# Patient Record
Sex: FEMALE | Race: BLACK OR AFRICAN AMERICAN | NOT HISPANIC OR LATINO
[De-identification: names, ages, dates, MRNs, and addresses within clinical notes are randomized per-mention and may not be internally consistent; named-entity substitution may affect disease eponyms.]

---

## 2019-05-09 ENCOUNTER — FORM ENCOUNTER (OUTPATIENT)
Age: 84
End: 2019-05-09

## 2019-05-10 ENCOUNTER — APPOINTMENT (OUTPATIENT)
Dept: CARDIOTHORACIC SURGERY | Facility: CLINIC | Age: 84
End: 2019-05-10
Payer: MEDICARE

## 2019-05-10 ENCOUNTER — OUTPATIENT (OUTPATIENT)
Dept: OUTPATIENT SERVICES | Facility: HOSPITAL | Age: 84
LOS: 1 days | End: 2019-05-10
Payer: MEDICARE

## 2019-05-10 VITALS
BODY MASS INDEX: 21.69 KG/M2 | HEIGHT: 66 IN | SYSTOLIC BLOOD PRESSURE: 165 MMHG | TEMPERATURE: 97 F | HEART RATE: 100 BPM | OXYGEN SATURATION: 97 % | RESPIRATION RATE: 18 BRPM | WEIGHT: 135 LBS | DIASTOLIC BLOOD PRESSURE: 109 MMHG

## 2019-05-10 DIAGNOSIS — Z82.49 FAMILY HISTORY OF ISCHEMIC HEART DISEASE AND OTHER DISEASES OF THE CIRCULATORY SYSTEM: ICD-10-CM

## 2019-05-10 DIAGNOSIS — I35.1 NONRHEUMATIC AORTIC (VALVE) INSUFFICIENCY: ICD-10-CM

## 2019-05-10 DIAGNOSIS — I34.0 NONRHEUMATIC MITRAL (VALVE) INSUFFICIENCY: ICD-10-CM

## 2019-05-10 DIAGNOSIS — H26.9 UNSPECIFIED CATARACT: ICD-10-CM

## 2019-05-10 PROCEDURE — 99205 OFFICE O/P NEW HI 60 MIN: CPT

## 2019-05-10 PROCEDURE — 93306 TTE W/DOPPLER COMPLETE: CPT | Mod: 26

## 2019-05-10 PROCEDURE — 93306 TTE W/DOPPLER COMPLETE: CPT

## 2019-05-10 NOTE — REVIEW OF SYSTEMS
[Palpitations] : palpitations [SOB on Exertion] : shortness of breath during exertion [Negative] : Heme/Lymph

## 2019-05-23 ENCOUNTER — FORM ENCOUNTER (OUTPATIENT)
Age: 84
End: 2019-05-23

## 2019-05-24 ENCOUNTER — APPOINTMENT (OUTPATIENT)
Dept: CARDIOTHORACIC SURGERY | Facility: CLINIC | Age: 84
End: 2019-05-24
Payer: MEDICARE

## 2019-05-24 ENCOUNTER — APPOINTMENT (OUTPATIENT)
Dept: CARDIOTHORACIC SURGERY | Facility: CLINIC | Age: 84
End: 2019-05-24

## 2019-05-24 ENCOUNTER — OUTPATIENT (OUTPATIENT)
Dept: OUTPATIENT SERVICES | Facility: HOSPITAL | Age: 84
LOS: 1 days | End: 2019-05-24
Payer: MEDICARE

## 2019-05-24 ENCOUNTER — APPOINTMENT (OUTPATIENT)
Dept: CT IMAGING | Facility: HOSPITAL | Age: 84
End: 2019-05-24
Payer: MEDICARE

## 2019-05-24 DIAGNOSIS — I34.0 NONRHEUMATIC MITRAL (VALVE) INSUFFICIENCY: ICD-10-CM

## 2019-05-24 PROCEDURE — 99204 OFFICE O/P NEW MOD 45 MIN: CPT

## 2019-05-24 PROCEDURE — 93325 DOPPLER ECHO COLOR FLOW MAPG: CPT | Mod: 26

## 2019-05-24 PROCEDURE — 93312 ECHO TRANSESOPHAGEAL: CPT | Mod: 26

## 2019-05-24 PROCEDURE — 93312 ECHO TRANSESOPHAGEAL: CPT

## 2019-05-24 PROCEDURE — 75572 CT HRT W/3D IMAGE: CPT

## 2019-05-24 PROCEDURE — 93320 DOPPLER ECHO COMPLETE: CPT | Mod: 26

## 2019-05-24 PROCEDURE — 75572 CT HRT W/3D IMAGE: CPT | Mod: 26

## 2019-05-28 VITALS — DIASTOLIC BLOOD PRESSURE: 97 MMHG | HEART RATE: 63 BPM | OXYGEN SATURATION: 100 % | SYSTOLIC BLOOD PRESSURE: 160 MMHG

## 2019-05-28 NOTE — CONSULT LETTER
[Please see my note below.] : Please see my note below. [Dear  ___] : Dear  [unfilled], [Sincerely,] : Sincerely, [FreeTextEntry2] : Armani Bonilla MD\par [address] [place] [address]\[place]  [phone] [FreeTextEntry1] : Please find attached our consultation on your patient, SUE BLUNT \par We take a multidisciplinary team approach to patient care and consider you, the referring physician, an extension of our team. We will maintain an open line of communication with you throughout your patient's treatment course.  \par It is our commitment to provide your patient with the highest quality of advanced therapeutic options. We thank you for allowing us to participate in the care of your patient.\par Please do not hesitate to contact our team with any questions or concerns at [phone].\par  [FreeTextEntry3] : Chuck Munoz MD, FRCS\par \par [facility] \par Department of Cardiothoracic  Surgery \par Director of Robotic Cardiac Surgery\par Professor of Cardiovascular & Thoracic Surgery\par [facility] [employer] \par \par BETI BraswellP\par

## 2019-05-31 ENCOUNTER — OUTPATIENT (OUTPATIENT)
Dept: OUTPATIENT SERVICES | Facility: HOSPITAL | Age: 84
LOS: 1 days | End: 2019-05-31
Payer: MEDICARE

## 2019-05-31 DIAGNOSIS — I34.0 NONRHEUMATIC MITRAL (VALVE) INSUFFICIENCY: ICD-10-CM

## 2019-05-31 LAB
CK MB CFR SERPL CALC: 4 NG/ML — SIGNIFICANT CHANGE UP (ref 0–6.7)
CK SERPL-CCNC: 208 U/L — HIGH (ref 25–170)
GGT SERPL-CCNC: 34 U/L — SIGNIFICANT CHANGE UP (ref 8–40)
NT-PROBNP SERPL-SCNC: 1027 PG/ML — HIGH (ref 0–300)
URATE SERPL-MCNC: 5 MG/DL — SIGNIFICANT CHANGE UP (ref 2.5–7)

## 2019-05-31 PROCEDURE — 82977 ASSAY OF GGT: CPT

## 2019-05-31 PROCEDURE — 83880 ASSAY OF NATRIURETIC PEPTIDE: CPT

## 2019-05-31 PROCEDURE — 84550 ASSAY OF BLOOD/URIC ACID: CPT

## 2019-05-31 PROCEDURE — 36415 COLL VENOUS BLD VENIPUNCTURE: CPT

## 2019-05-31 PROCEDURE — 82553 CREATINE MB FRACTION: CPT

## 2019-05-31 PROCEDURE — 82550 ASSAY OF CK (CPK): CPT

## 2019-06-21 NOTE — HISTORY OF PRESENT ILLNESS
[FreeTextEntry1] : 89 yo female with a history of heart murmur x many years is referred by Dr. Bonilla with severe MR. She reports worsening palpitations and  FELIZ when walking 1 block. Otherwise she reports remaining active in all ADL's. 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 5/7/19 Echo performed by Dr. Bonilla revealed 3-4+ MR w/ possible flail posterior leaflet, 2+ AI, EF 55-60%. She presents today for surgical consult with Dr. Munoz accompanied by her friend. She had TTE today at [facility] prior to office visit. \par Today she appears well, NAD and younger than her stated age. She denies SOB or palpitations at rest only when she walks. She denies c/o chest pain, fever, brbpr, or syncope.

## 2019-06-21 NOTE — ASSESSMENT
[FreeTextEntry1] : 89 year old female with history of heart murmur presents with nyha class 2-3 symptoms with severe MR. Dr. Munoz reviewed the echocardiogram images with the patient and her friend and discussed the case with Dr. Rojas.  Dr. Munoz discussed the risks, benefits and alternatives to surgery.    Risks include but not limited to death, heart attack, bleeding, stroke, kidney problems and infection.  Dr. Munoz feels she is prohibitive risk for surgery and recommends that she be considered for catheter based procedure to correct MR.  All questions were addressed. \par \par Plan: \par refer to Dr. Rjoas\par MESFIN\par SHD CTA\par \par

## 2019-06-21 NOTE — CONSULT LETTER
[Dear  ___] : Dear  [unfilled], [Please see my note below.] : Please see my note below. [Sincerely,] : Sincerely, [FreeTextEntry2] : Armani Bonilla MD\par [address] [place] [address]\[place]  [phone] [FreeTextEntry1] : Please find attached our consultation on your patient, SUE BLUNT \par We take a multidisciplinary team approach to patient care and consider you, the referring physician, an extension of our team. We will maintain an open line of communication with you throughout your patient's treatment course.  \par It is our commitment to provide your patient with the highest quality of advanced therapeutic options. We thank you for allowing us to participate in the care of your patient.\par Please do not hesitate to contact our team with any questions or concerns at [phone].\par  [FreeTextEntry3] : Chuck Munoz MD, FRCS\par \par [facility] \par Department of Cardiothoracic  Surgery \par Director of Robotic Cardiac Surgery\par Professor of Cardiovascular & Thoracic Surgery\par [facility] [employer] \par \par BETI BraswellP\par

## 2019-06-21 NOTE — PHYSICAL EXAM
[General Appearance - Alert] : alert [Sclera] : the sclera and conjunctiva were normal [PERRL With Normal Accommodation] : pupils were equal in size, round, and reactive to light [General Appearance - In No Acute Distress] : in no acute distress [Outer Ear] : the ears and nose were normal in appearance [Extraocular Movements] : extraocular movements were intact [Oropharynx] : the oropharynx was normal [Neck Appearance] : the appearance of the neck was normal [Neck Cervical Mass (___cm)] : no neck mass was observed [Thyroid Nodule] : there were no palpable thyroid nodules [Thyroid Diffuse Enlargement] : the thyroid was not enlarged [Jugular Venous Distention Increased] : there was no jugular-venous distention [Auscultation Breath Sounds / Voice Sounds] : lungs were clear to auscultation bilaterally [Examination Of The Chest] : the chest was normal in appearance [Chest Visual Inspection Thoracic Asymmetry] : no chest asymmetry [Diminished Respiratory Excursion] : normal chest expansion [No Abnormalities] : the abdominal aorta was not enlarged and no bruit was heard [2+] : left 2+ [Abdomen Soft] : soft [Bowel Sounds] : normal bowel sounds [Abdomen Tenderness] : non-tender [Abdomen Mass (___ Cm)] : no abdominal mass palpated [No CVA Tenderness] : no ~M costovertebral angle tenderness [No Spinal Tenderness] : no spinal tenderness [Abnormal Walk] : normal gait [Nail Clubbing] : no clubbing  or cyanosis of the fingernails [Musculoskeletal - Swelling] : no joint swelling seen [Motor Tone] : muscle strength and tone were normal [Skin Color & Pigmentation] : normal skin color and pigmentation [Skin Turgor] : normal skin turgor [] : no rash [Deep Tendon Reflexes (DTR)] : deep tendon reflexes were 2+ and symmetric [Sensation] : the sensory exam was normal to light touch and pinprick [No Focal Deficits] : no focal deficits [Oriented To Time, Place, And Person] : oriented to person, place, and time [Impaired Insight] : insight and judgment were intact [Affect] : the affect was normal [Right Carotid Bruit] : no bruit heard over the right carotid [Left Carotid Bruit] : no bruit heard over the left carotid [Left Femoral Bruit] : no bruit heard over the left femoral artery [Right Femoral Bruit] : no bruit heard over the right femoral artery [FreeTextEntry1] : 1+ bilateral pedal edema

## 2019-06-26 NOTE — REASON FOR VISIT
[Initial Evaluation] : an initial evaluation of [Friend] : friend [FreeTextEntry1] : mitral regurgitation.

## 2019-06-26 NOTE — PHYSICAL EXAM
[General Appearance - Alert] : alert [General Appearance - In No Acute Distress] : in no acute distress [Sclera] : the sclera and conjunctiva were normal [PERRL With Normal Accommodation] : pupils were equal in size, round, and reactive to light [Extraocular Movements] : extraocular movements were intact [Oropharynx] : the oropharynx was normal [Outer Ear] : the ears and nose were normal in appearance [Neck Appearance] : the appearance of the neck was normal [Thyroid Diffuse Enlargement] : the thyroid was not enlarged [Jugular Venous Distention Increased] : there was no jugular-venous distention [Neck Cervical Mass (___cm)] : no neck mass was observed [Thyroid Nodule] : there were no palpable thyroid nodules [Auscultation Breath Sounds / Voice Sounds] : lungs were clear to auscultation bilaterally [Diminished Respiratory Excursion] : normal chest expansion [Examination Of The Chest] : the chest was normal in appearance [Chest Visual Inspection Thoracic Asymmetry] : no chest asymmetry [2+] : right 2+ [No Abnormalities] : the abdominal aorta was not enlarged and no bruit was heard [Bowel Sounds] : normal bowel sounds [Abdomen Soft] : soft [Abdomen Tenderness] : non-tender [Abdomen Mass (___ Cm)] : no abdominal mass palpated [No Spinal Tenderness] : no spinal tenderness [No CVA Tenderness] : no ~M costovertebral angle tenderness [Nail Clubbing] : no clubbing  or cyanosis of the fingernails [Abnormal Walk] : normal gait [Motor Tone] : muscle strength and tone were normal [Musculoskeletal - Swelling] : no joint swelling seen [Skin Color & Pigmentation] : normal skin color and pigmentation [Skin Turgor] : normal skin turgor [] : no rash [Deep Tendon Reflexes (DTR)] : deep tendon reflexes were 2+ and symmetric [Sensation] : the sensory exam was normal to light touch and pinprick [No Focal Deficits] : no focal deficits [Impaired Insight] : insight and judgment were intact [Oriented To Time, Place, And Person] : oriented to person, place, and time [Affect] : the affect was normal [Right Carotid Bruit] : no bruit heard over the right carotid [Left Carotid Bruit] : no bruit heard over the left carotid [Right Femoral Bruit] : no bruit heard over the right femoral artery [Left Femoral Bruit] : no bruit heard over the left femoral artery [FreeTextEntry1] : 1+ bilateral pedal edema

## 2019-06-26 NOTE — HISTORY OF PRESENT ILLNESS
[FreeTextEntry1] : 89 year old female with a history of HTN, new atrial fibrillation (on Xarelto) and chronic diastolic heart failure with severe mitral regurgitation who has been referred for further evaluation of her valvular heart disease. \par \par The patient reports FELIZ and worsening palpitations when walking about one block. She denies any SOB at rest. The patient also denies chest pain, orthopnea, PND, dizziness, syncope and LE edema. She was sent for an ECHO and found to have severe mitral regurgitation. She was also diagnosed with new atrial fibrillation and started on Toprol and Eliquis.   \par \par The patient live at home by herself. She remains independent in her ADLs. She ambulates with a rollator.

## 2019-06-28 ENCOUNTER — CHART COPY (OUTPATIENT)
Age: 84
End: 2019-06-28

## 2019-07-01 VITALS
HEIGHT: 66 IN | RESPIRATION RATE: 16 BRPM | DIASTOLIC BLOOD PRESSURE: 81 MMHG | WEIGHT: 134.92 LBS | SYSTOLIC BLOOD PRESSURE: 163 MMHG | HEART RATE: 57 BPM | TEMPERATURE: 97 F | OXYGEN SATURATION: 100 %

## 2019-07-02 ENCOUNTER — INPATIENT (INPATIENT)
Facility: HOSPITAL | Age: 84
LOS: 3 days | Discharge: HOME HEALTH | DRG: 229 | End: 2019-07-06
Attending: INTERNAL MEDICINE | Admitting: INTERNAL MEDICINE
Payer: MEDICARE

## 2019-07-02 DIAGNOSIS — Z98.890 OTHER SPECIFIED POSTPROCEDURAL STATES: Chronic | ICD-10-CM

## 2019-07-02 DIAGNOSIS — Z90.710 ACQUIRED ABSENCE OF BOTH CERVIX AND UTERUS: Chronic | ICD-10-CM

## 2019-07-02 LAB
ALBUMIN SERPL ELPH-MCNC: 4.2 G/DL — SIGNIFICANT CHANGE UP (ref 3.3–5)
ALP SERPL-CCNC: 83 U/L — SIGNIFICANT CHANGE UP (ref 40–120)
ALT FLD-CCNC: 16 U/L — SIGNIFICANT CHANGE UP (ref 10–45)
ANION GAP SERPL CALC-SCNC: 11 MMOL/L — SIGNIFICANT CHANGE UP (ref 5–17)
APTT BLD: 38 SEC — HIGH (ref 27.5–36.3)
AST SERPL-CCNC: 24 U/L — SIGNIFICANT CHANGE UP (ref 10–40)
BILIRUB SERPL-MCNC: 0.7 MG/DL — SIGNIFICANT CHANGE UP (ref 0.2–1.2)
BUN SERPL-MCNC: 16 MG/DL — SIGNIFICANT CHANGE UP (ref 7–23)
CALCIUM SERPL-MCNC: 9.5 MG/DL — SIGNIFICANT CHANGE UP (ref 8.4–10.5)
CHLORIDE SERPL-SCNC: 109 MMOL/L — HIGH (ref 96–108)
CHOLEST SERPL-MCNC: 171 MG/DL — SIGNIFICANT CHANGE UP (ref 10–199)
CO2 SERPL-SCNC: 24 MMOL/L — SIGNIFICANT CHANGE UP (ref 22–31)
CREAT SERPL-MCNC: 0.83 MG/DL — SIGNIFICANT CHANGE UP (ref 0.5–1.3)
GLUCOSE SERPL-MCNC: 86 MG/DL — SIGNIFICANT CHANGE UP (ref 70–99)
HBA1C BLD-MCNC: 5.3 % — SIGNIFICANT CHANGE UP (ref 4–5.6)
HCT VFR BLD CALC: 42.5 % — SIGNIFICANT CHANGE UP (ref 34.5–45)
HDLC SERPL-MCNC: 69 MG/DL — SIGNIFICANT CHANGE UP
HGB BLD-MCNC: 13.8 G/DL — SIGNIFICANT CHANGE UP (ref 11.5–15.5)
INR BLD: 1.32 — HIGH (ref 0.88–1.16)
LIPID PNL WITH DIRECT LDL SERPL: 88 MG/DL — SIGNIFICANT CHANGE UP
MAGNESIUM SERPL-MCNC: 2.2 MG/DL — SIGNIFICANT CHANGE UP (ref 1.6–2.6)
MCHC RBC-ENTMCNC: 30.2 PG — SIGNIFICANT CHANGE UP (ref 27–34)
MCHC RBC-ENTMCNC: 32.5 GM/DL — SIGNIFICANT CHANGE UP (ref 32–36)
MCV RBC AUTO: 93 FL — SIGNIFICANT CHANGE UP (ref 80–100)
NRBC # BLD: 0 /100 WBCS — SIGNIFICANT CHANGE UP (ref 0–0)
NT-PROBNP SERPL-SCNC: 873 PG/ML — HIGH (ref 0–300)
PHOSPHATE SERPL-MCNC: 3.5 MG/DL — SIGNIFICANT CHANGE UP (ref 2.5–4.5)
PLATELET # BLD AUTO: 166 K/UL — SIGNIFICANT CHANGE UP (ref 150–400)
POTASSIUM SERPL-MCNC: 4.3 MMOL/L — SIGNIFICANT CHANGE UP (ref 3.5–5.3)
POTASSIUM SERPL-SCNC: 4.3 MMOL/L — SIGNIFICANT CHANGE UP (ref 3.5–5.3)
PROT SERPL-MCNC: 6.9 G/DL — SIGNIFICANT CHANGE UP (ref 6–8.3)
PROTHROM AB SERPL-ACNC: 15.1 SEC — HIGH (ref 10–12.9)
RBC # BLD: 4.57 M/UL — SIGNIFICANT CHANGE UP (ref 3.8–5.2)
RBC # FLD: 13.7 % — SIGNIFICANT CHANGE UP (ref 10.3–14.5)
SODIUM SERPL-SCNC: 144 MMOL/L — SIGNIFICANT CHANGE UP (ref 135–145)
TOTAL CHOLESTEROL/HDL RATIO MEASUREMENT: 2.5 RATIO — LOW (ref 3.3–7.1)
TRIGL SERPL-MCNC: 70 MG/DL — SIGNIFICANT CHANGE UP (ref 10–149)
TSH SERPL-MCNC: 0.7 UIU/ML — SIGNIFICANT CHANGE UP (ref 0.35–4.94)
WBC # BLD: 4.76 K/UL — SIGNIFICANT CHANGE UP (ref 3.8–10.5)
WBC # FLD AUTO: 4.76 K/UL — SIGNIFICANT CHANGE UP (ref 3.8–10.5)

## 2019-07-02 PROCEDURE — 71046 X-RAY EXAM CHEST 2 VIEWS: CPT | Mod: 26

## 2019-07-02 RX ORDER — LOSARTAN POTASSIUM 100 MG/1
25 TABLET, FILM COATED ORAL DAILY
Refills: 0 | Status: DISCONTINUED | OUTPATIENT
Start: 2019-07-02 | End: 2019-07-04

## 2019-07-02 RX ORDER — CHLORHEXIDINE GLUCONATE 213 G/1000ML
1 SOLUTION TOPICAL ONCE
Refills: 0 | Status: COMPLETED | OUTPATIENT
Start: 2019-07-02 | End: 2019-07-02

## 2019-07-02 RX ORDER — LATANOPROST 0.05 MG/ML
1 SOLUTION/ DROPS OPHTHALMIC; TOPICAL AT BEDTIME
Refills: 0 | Status: DISCONTINUED | OUTPATIENT
Start: 2019-07-02 | End: 2019-07-06

## 2019-07-02 RX ORDER — POTASSIUM CHLORIDE 20 MEQ
1 PACKET (EA) ORAL
Qty: 7 | Refills: 0
Start: 2019-07-02 | End: 2019-07-31

## 2019-07-02 RX ORDER — CHLORHEXIDINE GLUCONATE 213 G/1000ML
5 SOLUTION TOPICAL ONCE
Refills: 0 | Status: DISCONTINUED | OUTPATIENT
Start: 2019-07-02 | End: 2019-07-03

## 2019-07-02 RX ORDER — METOPROLOL TARTRATE 50 MG
25 TABLET ORAL DAILY
Refills: 0 | Status: DISCONTINUED | OUTPATIENT
Start: 2019-07-02 | End: 2019-07-03

## 2019-07-02 RX ORDER — FUROSEMIDE 40 MG
1 TABLET ORAL
Qty: 7 | Refills: 0
Start: 2019-07-02 | End: 2019-07-31

## 2019-07-02 RX ORDER — HEPARIN SODIUM 5000 [USP'U]/ML
5000 INJECTION INTRAVENOUS; SUBCUTANEOUS EVERY 8 HOURS
Refills: 0 | Status: DISCONTINUED | OUTPATIENT
Start: 2019-07-02 | End: 2019-07-03

## 2019-07-02 RX ORDER — SODIUM CHLORIDE 9 MG/ML
3 INJECTION INTRAMUSCULAR; INTRAVENOUS; SUBCUTANEOUS EVERY 8 HOURS
Refills: 0 | Status: DISCONTINUED | OUTPATIENT
Start: 2019-07-02 | End: 2019-07-06

## 2019-07-02 RX ORDER — CHLORHEXIDINE GLUCONATE 213 G/1000ML
1 SOLUTION TOPICAL ONCE
Refills: 0 | Status: COMPLETED | OUTPATIENT
Start: 2019-07-02 | End: 2019-07-03

## 2019-07-02 RX ORDER — CHLORHEXIDINE GLUCONATE 213 G/1000ML
1 SOLUTION TOPICAL ONCE
Refills: 0 | Status: COMPLETED | OUTPATIENT
Start: 2019-07-03 | End: 2019-07-03

## 2019-07-02 RX ORDER — FUROSEMIDE 40 MG
20 TABLET ORAL DAILY
Refills: 0 | Status: DISCONTINUED | OUTPATIENT
Start: 2019-07-02 | End: 2019-07-03

## 2019-07-02 RX ORDER — PANTOPRAZOLE SODIUM 20 MG/1
40 TABLET, DELAYED RELEASE ORAL
Refills: 0 | Status: DISCONTINUED | OUTPATIENT
Start: 2019-07-02 | End: 2019-07-06

## 2019-07-02 RX ADMIN — CHLORHEXIDINE GLUCONATE 1 APPLICATION(S): 213 SOLUTION TOPICAL at 23:05

## 2019-07-02 RX ADMIN — LATANOPROST 1 DROP(S): 0.05 SOLUTION/ DROPS OPHTHALMIC; TOPICAL at 22:53

## 2019-07-02 RX ADMIN — PANTOPRAZOLE SODIUM 40 MILLIGRAM(S): 20 TABLET, DELAYED RELEASE ORAL at 16:36

## 2019-07-02 RX ADMIN — LOSARTAN POTASSIUM 25 MILLIGRAM(S): 100 TABLET, FILM COATED ORAL at 16:36

## 2019-07-02 RX ADMIN — HEPARIN SODIUM 5000 UNIT(S): 5000 INJECTION INTRAVENOUS; SUBCUTANEOUS at 22:53

## 2019-07-02 RX ADMIN — SODIUM CHLORIDE 3 MILLILITER(S): 9 INJECTION INTRAMUSCULAR; INTRAVENOUS; SUBCUTANEOUS at 21:48

## 2019-07-02 RX ADMIN — Medication 20 MILLIGRAM(S): at 16:32

## 2019-07-02 NOTE — H&P ADULT - NSHPSOCIALHISTORY_GEN_ALL_CORE
never a smoker, denies alcohol and illicit drug use  lives alone  uses a rollator to walk, independent of her ADLs

## 2019-07-02 NOTE — H&P ADULT - NSICDXPASTMEDICALHX_GEN_ALL_CORE_FT
PAST MEDICAL HISTORY:  Atrial fibrillation     Diastolic heart failure     HTN (hypertension)     Mitral regurgitation

## 2019-07-02 NOTE — H&P ADULT - NSHPREVIEWOFSYSTEMS_GEN_ALL_CORE
Review of Systems  CONSTITUTIONAL:  Denies Fevers / chills, sweats, fatigue, weight loss, weight gain                                      NEURO:  Denies parathesias, seizures, syncope, confusion                                                                                EYES:  Denies Blurry vision, discharge, pain, loss of vision                                                                                    ENMT:  Denies Difficulty hearing, vertigo, dysphagia, epistaxis, recent dental work                                       CV: +LE edema, palpitations, FELIZ, Denies Chest pain, FELIZ,                                                                                          RESPIRATORY:  Denies Wheezing, SOB, cough / sputum, hemoptysis                                                                GI:  Denies Nausea, vommiting, diarrhea, constipation, melena, difficulty swallowing                                               : Denies Hematuria, dysuria, urgency, incontinence                                                                                         MUSKULOSKELETAL:  Denies arthritis, joint swelling, muscle weakness                                                             SKIN/BREAST:  Denies rash, itching, jade loss, masses                                                                                            PSYCH:  Denies depresion, anxiety, suicidal ideation                                                                                               HEME/LYMPH:  Denies bruises easily, enlarged lymph nodes, tender lymph nodes                                        ENDOCRINE:  Denies cold intolerance, heat intolerance, polydipsia

## 2019-07-02 NOTE — H&P ADULT - ASSESSMENT
89 y/o female with hx of HTN, new afib (on eliquis), hx GI bleed, chronic diastolic heart failure with known severe MR who was referred for surgical evaluation. She was seen by Dr. Munoz for surgical intervention but was found to not be a candidate. She was evaluated by Dr. Rojas and found to be a candidate for transcatheter intervention. She reports FELIZ and worsening palpitations when walking about one block. She also states that she has had new LE edema over the past week. She denies any CP, SOB at rest, PND, orthopnea, dizziness, syncope. She underwent a preop workup which included a MESFIN showing severely dilated left and right atria, mitral valve prolapse with flail posterior leaflet ( P2 and P3 scallops). Severe eccentric anteriorly directed mitral valve regurgitation. Aortic sclerosis with mild AI, severe TR and severe pulmonary hypertension, PASP 60mmhg. She had a Structural CTA showing minimal stenosis of the proximal and mid LAD, and the rest of the coronaries were normal. She was planned to have an admission with same day surgery for transcatheter mitral valve repair but she took her Eliquis at 2PM on 7/1. Due to risk of bleeding case was canceled and patient will be admitted for IV diuresis and planned surgery on this admission.         Neuro:  - no significant history, no evidence of pain     CVS;  - HD stable, hx of Afib (will hold Eliquis for pending procedure)  - Hx HTN, on metoprolol and losartan. will continue if indicated, HR 57 in SDA    Pulm:  - will obtain xray on admission to [facility], on RA satting well    GI:  - will start GI ppx while admitted    :  - BUN/Cr normal, no hx of CKD  - will start IV lasix for diuresis prior to procedure    Heme:   - no Eliquis, will start DVT ppx by tomorrow    ID:  - no evidence of infx, WBC normal  - will continue to monitor      Dispo:  - admit to [facility], hold Eliquis and start IV diuretics in preparation for planned transcatheter mitral valve repair

## 2019-07-02 NOTE — H&P ADULT - NSHPPHYSICALEXAM_GEN_ALL_CORE
Physical Exam  CONSTITUTIONAL: NAD, stable   NEURO: A&O x3, no focal deficits                      EYES: EOMI, PERRLA   ENMT: normocephalic, atraumatic, no JVD, no carotid bruit   CV: RRR, normal S1, S2   RESPIRATORY: CTA b/l  GI: +BS, soft, nontender  : no  symtoms   MUSKULOSKELETAL: FROM b/l, no joint swelling  SKIN / BREAST: no lacerations/ abrasions, old abdominal incisions  Vasc: 1+ edema b/l, 2+ DP pulses, doppler + PT b/l, UE 2+, femoral b/l 2+

## 2019-07-02 NOTE — H&P ADULT - HISTORY OF PRESENT ILLNESS
89 y/o female with hx of HTN, new afib (on eliquis), hx GI bleed, chronic diastolic heart failure with known severe MR who was referred for surgical evaluation. She was seen by Dr. Munoz for surgical intervention but was found to not be a candidate. She was evaluated by Dr. Rojas and found to be a candidate for transcatheter intervention. She reports FELIZ and worsening palpitations when walking about one block. She also states that she has had new LE edema over the past week. She denies any CP, SOB at rest, PND, orthopnea, dizziness, syncope. She underwent a preop workup which included a MESFIN showing severely dilated left and right atria, mitral valve prolapse with flail posterior leaflet ( P2 and P3 scallops). Severe eccentric anteriorly directed mitral valve regurgitation. Aortic sclerosis with mild AI, severe TR and severe pulmonary hypertension, PASP 60mmhg. She had a Structural CTA showing minimal stenosis of the proximal and mid LAD, and the rest of the coronaries were normal. She was planned to have an admission with same day surgery for transcatheter mitral valve repair but she took her Eliquis at 2PM on 7/1. Due to risk of bleeding case was canceled and patient will be admitted for IV diuresis and planned surgery on this admission.

## 2019-07-02 NOTE — PROGRESS NOTE ADULT - SUBJECTIVE AND OBJECTIVE BOX
89 y/o female htn pafib severe mr on beta blocker eliquis arb  hx diverticulosis admitted for transcatheter mitral valve repair clinically stable comfortable alert lungs clear heart s1s2 3/6 holosystolic apical murmur bp 140/80 discussed with all concerned

## 2019-07-03 ENCOUNTER — APPOINTMENT (OUTPATIENT)
Dept: CARDIOTHORACIC SURGERY | Facility: HOSPITAL | Age: 84
End: 2019-07-03

## 2019-07-03 ENCOUNTER — APPOINTMENT (OUTPATIENT)
Dept: CARDIOTHORACIC SURGERY | Facility: HOSPITAL | Age: 84
End: 2019-07-03
Payer: MEDICARE

## 2019-07-03 PROBLEM — I34.0 NONRHEUMATIC MITRAL (VALVE) INSUFFICIENCY: Chronic | Status: ACTIVE | Noted: 2019-07-02

## 2019-07-03 PROBLEM — I10 ESSENTIAL (PRIMARY) HYPERTENSION: Chronic | Status: ACTIVE | Noted: 2019-07-02

## 2019-07-03 PROBLEM — I50.30 UNSPECIFIED DIASTOLIC (CONGESTIVE) HEART FAILURE: Chronic | Status: ACTIVE | Noted: 2019-07-02

## 2019-07-03 PROBLEM — I48.91 UNSPECIFIED ATRIAL FIBRILLATION: Chronic | Status: ACTIVE | Noted: 2019-07-02

## 2019-07-03 LAB
ALBUMIN SERPL ELPH-MCNC: 3.6 G/DL — SIGNIFICANT CHANGE UP (ref 3.3–5)
ALP SERPL-CCNC: 85 U/L — SIGNIFICANT CHANGE UP (ref 40–120)
ALT FLD-CCNC: 15 U/L — SIGNIFICANT CHANGE UP (ref 10–45)
ANION GAP SERPL CALC-SCNC: 12 MMOL/L — SIGNIFICANT CHANGE UP (ref 5–17)
ANION GAP SERPL CALC-SCNC: 15 MMOL/L — SIGNIFICANT CHANGE UP (ref 5–17)
APTT BLD: 36 SEC — SIGNIFICANT CHANGE UP (ref 27.5–36.3)
APTT BLD: 40.4 SEC — HIGH (ref 27.5–36.3)
AST SERPL-CCNC: 26 U/L — SIGNIFICANT CHANGE UP (ref 10–40)
BASOPHILS # BLD AUTO: 0.04 K/UL — SIGNIFICANT CHANGE UP (ref 0–0.2)
BASOPHILS NFR BLD AUTO: 0.4 % — SIGNIFICANT CHANGE UP (ref 0–2)
BILIRUB SERPL-MCNC: 0.8 MG/DL — SIGNIFICANT CHANGE UP (ref 0.2–1.2)
BUN SERPL-MCNC: 14 MG/DL — SIGNIFICANT CHANGE UP (ref 7–23)
BUN SERPL-MCNC: 14 MG/DL — SIGNIFICANT CHANGE UP (ref 7–23)
CALCIUM SERPL-MCNC: 9 MG/DL — SIGNIFICANT CHANGE UP (ref 8.4–10.5)
CALCIUM SERPL-MCNC: 9.7 MG/DL — SIGNIFICANT CHANGE UP (ref 8.4–10.5)
CHLORIDE SERPL-SCNC: 105 MMOL/L — SIGNIFICANT CHANGE UP (ref 96–108)
CHLORIDE SERPL-SCNC: 106 MMOL/L — SIGNIFICANT CHANGE UP (ref 96–108)
CHOLEST SERPL-MCNC: 180 MG/DL — SIGNIFICANT CHANGE UP (ref 10–199)
CO2 SERPL-SCNC: 20 MMOL/L — LOW (ref 22–31)
CO2 SERPL-SCNC: 26 MMOL/L — SIGNIFICANT CHANGE UP (ref 22–31)
CREAT SERPL-MCNC: 0.81 MG/DL — SIGNIFICANT CHANGE UP (ref 0.5–1.3)
CREAT SERPL-MCNC: 0.83 MG/DL — SIGNIFICANT CHANGE UP (ref 0.5–1.3)
EOSINOPHIL # BLD AUTO: 0.02 K/UL — SIGNIFICANT CHANGE UP (ref 0–0.5)
EOSINOPHIL NFR BLD AUTO: 0.2 % — SIGNIFICANT CHANGE UP (ref 0–6)
GAS PNL BLDA: SIGNIFICANT CHANGE UP
GAS PNL BLDA: SIGNIFICANT CHANGE UP
GLUCOSE SERPL-MCNC: 106 MG/DL — HIGH (ref 70–99)
GLUCOSE SERPL-MCNC: 89 MG/DL — SIGNIFICANT CHANGE UP (ref 70–99)
HCT VFR BLD CALC: 43.3 % — SIGNIFICANT CHANGE UP (ref 34.5–45)
HCT VFR BLD CALC: 47.1 % — HIGH (ref 34.5–45)
HDLC SERPL-MCNC: 82 MG/DL — SIGNIFICANT CHANGE UP
HGB BLD-MCNC: 14.4 G/DL — SIGNIFICANT CHANGE UP (ref 11.5–15.5)
HGB BLD-MCNC: 15 G/DL — SIGNIFICANT CHANGE UP (ref 11.5–15.5)
IMM GRANULOCYTES NFR BLD AUTO: 0.3 % — SIGNIFICANT CHANGE UP (ref 0–1.5)
INR BLD: 1.24 — HIGH (ref 0.88–1.16)
INR BLD: 1.43 — HIGH (ref 0.88–1.16)
LACTATE SERPL-SCNC: 1.6 MMOL/L — SIGNIFICANT CHANGE UP (ref 0.5–2)
LIPID PNL WITH DIRECT LDL SERPL: 88 MG/DL — SIGNIFICANT CHANGE UP
LYMPHOCYTES # BLD AUTO: 0.62 K/UL — LOW (ref 1–3.3)
LYMPHOCYTES # BLD AUTO: 7 % — LOW (ref 13–44)
MAGNESIUM SERPL-MCNC: 1.6 MG/DL — SIGNIFICANT CHANGE UP (ref 1.6–2.6)
MAGNESIUM SERPL-MCNC: 2 MG/DL — SIGNIFICANT CHANGE UP (ref 1.6–2.6)
MCHC RBC-ENTMCNC: 29.7 PG — SIGNIFICANT CHANGE UP (ref 27–34)
MCHC RBC-ENTMCNC: 30.7 PG — SIGNIFICANT CHANGE UP (ref 27–34)
MCHC RBC-ENTMCNC: 31.8 GM/DL — LOW (ref 32–36)
MCHC RBC-ENTMCNC: 33.3 GM/DL — SIGNIFICANT CHANGE UP (ref 32–36)
MCV RBC AUTO: 92.3 FL — SIGNIFICANT CHANGE UP (ref 80–100)
MCV RBC AUTO: 93.3 FL — SIGNIFICANT CHANGE UP (ref 80–100)
MONOCYTES # BLD AUTO: 0.09 K/UL — SIGNIFICANT CHANGE UP (ref 0–0.9)
MONOCYTES NFR BLD AUTO: 1 % — LOW (ref 2–14)
NEUTROPHILS # BLD AUTO: 8.09 K/UL — HIGH (ref 1.8–7.4)
NEUTROPHILS NFR BLD AUTO: 91.1 % — HIGH (ref 43–77)
NRBC # BLD: 0 /100 WBCS — SIGNIFICANT CHANGE UP (ref 0–0)
NRBC # BLD: 0 /100 WBCS — SIGNIFICANT CHANGE UP (ref 0–0)
PHOSPHATE SERPL-MCNC: 3.7 MG/DL — SIGNIFICANT CHANGE UP (ref 2.5–4.5)
PLATELET # BLD AUTO: 141 K/UL — LOW (ref 150–400)
PLATELET # BLD AUTO: 179 K/UL — SIGNIFICANT CHANGE UP (ref 150–400)
POTASSIUM SERPL-MCNC: 4 MMOL/L — SIGNIFICANT CHANGE UP (ref 3.5–5.3)
POTASSIUM SERPL-MCNC: 4 MMOL/L — SIGNIFICANT CHANGE UP (ref 3.5–5.3)
POTASSIUM SERPL-SCNC: 4 MMOL/L — SIGNIFICANT CHANGE UP (ref 3.5–5.3)
POTASSIUM SERPL-SCNC: 4 MMOL/L — SIGNIFICANT CHANGE UP (ref 3.5–5.3)
PROT SERPL-MCNC: 6.6 G/DL — SIGNIFICANT CHANGE UP (ref 6–8.3)
PROTHROM AB SERPL-ACNC: 14.1 SEC — HIGH (ref 10–12.9)
PROTHROM AB SERPL-ACNC: 16.3 SEC — HIGH (ref 10–12.9)
RBC # BLD: 4.69 M/UL — SIGNIFICANT CHANGE UP (ref 3.8–5.2)
RBC # BLD: 5.05 M/UL — SIGNIFICANT CHANGE UP (ref 3.8–5.2)
RBC # FLD: 13.6 % — SIGNIFICANT CHANGE UP (ref 10.3–14.5)
RBC # FLD: 13.7 % — SIGNIFICANT CHANGE UP (ref 10.3–14.5)
SODIUM SERPL-SCNC: 140 MMOL/L — SIGNIFICANT CHANGE UP (ref 135–145)
SODIUM SERPL-SCNC: 144 MMOL/L — SIGNIFICANT CHANGE UP (ref 135–145)
TOTAL CHOLESTEROL/HDL RATIO MEASUREMENT: 2.2 RATIO — LOW (ref 3.3–7.1)
TRIGL SERPL-MCNC: 51 MG/DL — SIGNIFICANT CHANGE UP (ref 10–149)
WBC # BLD: 4.74 K/UL — SIGNIFICANT CHANGE UP (ref 3.8–10.5)
WBC # BLD: 8.89 K/UL — SIGNIFICANT CHANGE UP (ref 3.8–10.5)
WBC # FLD AUTO: 4.74 K/UL — SIGNIFICANT CHANGE UP (ref 3.8–10.5)
WBC # FLD AUTO: 8.89 K/UL — SIGNIFICANT CHANGE UP (ref 3.8–10.5)

## 2019-07-03 PROCEDURE — 93320 DOPPLER ECHO COMPLETE: CPT | Mod: 26

## 2019-07-03 PROCEDURE — 93312 ECHO TRANSESOPHAGEAL: CPT | Mod: 26

## 2019-07-03 PROCEDURE — 93325 DOPPLER ECHO COLOR FLOW MAPG: CPT | Mod: 26

## 2019-07-03 PROCEDURE — 33419 REPAIR TCAT MITRAL VALVE: CPT | Mod: 62,Q0

## 2019-07-03 PROCEDURE — 33418 REPAIR TCAT MITRAL VALVE: CPT | Mod: 62,Q0

## 2019-07-03 PROCEDURE — 99291 CRITICAL CARE FIRST HOUR: CPT

## 2019-07-03 PROCEDURE — 93010 ELECTROCARDIOGRAM REPORT: CPT

## 2019-07-03 PROCEDURE — 33418 REPAIR TCAT MITRAL VALVE: CPT | Mod: Q0,62

## 2019-07-03 PROCEDURE — 33419 REPAIR TCAT MITRAL VALVE: CPT | Mod: Q0,62

## 2019-07-03 PROCEDURE — 33418 REPAIR TCAT MITRAL VALVE: CPT

## 2019-07-03 PROCEDURE — 71045 X-RAY EXAM CHEST 1 VIEW: CPT | Mod: 26

## 2019-07-03 PROCEDURE — 93010 ELECTROCARDIOGRAM REPORT: CPT | Mod: 77

## 2019-07-03 PROCEDURE — 33419 REPAIR TCAT MITRAL VALVE: CPT

## 2019-07-03 RX ORDER — SENNA PLUS 8.6 MG/1
2 TABLET ORAL AT BEDTIME
Refills: 0 | Status: DISCONTINUED | OUTPATIENT
Start: 2019-07-03 | End: 2019-07-06

## 2019-07-03 RX ORDER — SODIUM CHLORIDE 9 MG/ML
250 INJECTION INTRAMUSCULAR; INTRAVENOUS; SUBCUTANEOUS ONCE
Refills: 0 | Status: COMPLETED | OUTPATIENT
Start: 2019-07-03 | End: 2019-07-03

## 2019-07-03 RX ORDER — ASPIRIN/CALCIUM CARB/MAGNESIUM 324 MG
81 TABLET ORAL DAILY
Refills: 0 | Status: DISCONTINUED | OUTPATIENT
Start: 2019-07-04 | End: 2019-07-06

## 2019-07-03 RX ORDER — MAGNESIUM OXIDE 400 MG ORAL TABLET 241.3 MG
800 TABLET ORAL ONCE
Refills: 0 | Status: COMPLETED | OUTPATIENT
Start: 2019-07-03 | End: 2019-07-03

## 2019-07-03 RX ORDER — POLYETHYLENE GLYCOL 3350 17 G/17G
17 POWDER, FOR SOLUTION ORAL DAILY
Refills: 0 | Status: DISCONTINUED | OUTPATIENT
Start: 2019-07-03 | End: 2019-07-06

## 2019-07-03 RX ORDER — SODIUM CHLORIDE 9 MG/ML
1000 INJECTION INTRAMUSCULAR; INTRAVENOUS; SUBCUTANEOUS
Refills: 0 | Status: DISCONTINUED | OUTPATIENT
Start: 2019-07-03 | End: 2019-07-05

## 2019-07-03 RX ORDER — DOCUSATE SODIUM 100 MG
100 CAPSULE ORAL THREE TIMES A DAY
Refills: 0 | Status: DISCONTINUED | OUTPATIENT
Start: 2019-07-03 | End: 2019-07-06

## 2019-07-03 RX ORDER — CHLORHEXIDINE GLUCONATE 213 G/1000ML
5 SOLUTION TOPICAL EVERY 4 HOURS
Refills: 0 | Status: DISCONTINUED | OUTPATIENT
Start: 2019-07-03 | End: 2019-07-03

## 2019-07-03 RX ORDER — POTASSIUM CHLORIDE 20 MEQ
20 PACKET (EA) ORAL ONCE
Refills: 0 | Status: COMPLETED | OUTPATIENT
Start: 2019-07-03 | End: 2019-07-03

## 2019-07-03 RX ORDER — ASPIRIN/CALCIUM CARB/MAGNESIUM 324 MG
81 TABLET ORAL ONCE
Refills: 0 | Status: COMPLETED | OUTPATIENT
Start: 2019-07-03 | End: 2019-07-03

## 2019-07-03 RX ORDER — CLOPIDOGREL BISULFATE 75 MG/1
600 TABLET, FILM COATED ORAL ONCE
Refills: 0 | Status: DISCONTINUED | OUTPATIENT
Start: 2019-07-03 | End: 2019-07-03

## 2019-07-03 RX ORDER — HEPARIN SODIUM 5000 [USP'U]/ML
5000 INJECTION INTRAVENOUS; SUBCUTANEOUS EVERY 8 HOURS
Refills: 0 | Status: DISCONTINUED | OUTPATIENT
Start: 2019-07-03 | End: 2019-07-04

## 2019-07-03 RX ORDER — FAMOTIDINE 10 MG/ML
20 INJECTION INTRAVENOUS EVERY 12 HOURS
Refills: 0 | Status: DISCONTINUED | OUTPATIENT
Start: 2019-07-03 | End: 2019-07-03

## 2019-07-03 RX ORDER — MEPERIDINE HYDROCHLORIDE 50 MG/ML
25 INJECTION INTRAMUSCULAR; INTRAVENOUS; SUBCUTANEOUS ONCE
Refills: 0 | Status: DISCONTINUED | OUTPATIENT
Start: 2019-07-03 | End: 2019-07-03

## 2019-07-03 RX ADMIN — HEPARIN SODIUM 5000 UNIT(S): 5000 INJECTION INTRAVENOUS; SUBCUTANEOUS at 05:23

## 2019-07-03 RX ADMIN — Medication 81 MILLIGRAM(S): at 16:08

## 2019-07-03 RX ADMIN — Medication 20 MILLIGRAM(S): at 05:22

## 2019-07-03 RX ADMIN — SODIUM CHLORIDE 30 MILLILITER(S): 9 INJECTION INTRAMUSCULAR; INTRAVENOUS; SUBCUTANEOUS at 08:22

## 2019-07-03 RX ADMIN — SODIUM CHLORIDE 500 MILLILITER(S): 9 INJECTION INTRAMUSCULAR; INTRAVENOUS; SUBCUTANEOUS at 08:22

## 2019-07-03 RX ADMIN — Medication 20 MILLIEQUIVALENT(S): at 23:46

## 2019-07-03 RX ADMIN — Medication 25 MILLIGRAM(S): at 05:23

## 2019-07-03 RX ADMIN — CHLORHEXIDINE GLUCONATE 1 APPLICATION(S): 213 SOLUTION TOPICAL at 05:23

## 2019-07-03 RX ADMIN — SODIUM CHLORIDE 3 MILLILITER(S): 9 INJECTION INTRAMUSCULAR; INTRAVENOUS; SUBCUTANEOUS at 05:23

## 2019-07-03 RX ADMIN — MAGNESIUM OXIDE 400 MG ORAL TABLET 800 MILLIGRAM(S): 241.3 TABLET ORAL at 23:45

## 2019-07-03 RX ADMIN — SODIUM CHLORIDE 3 MILLILITER(S): 9 INJECTION INTRAMUSCULAR; INTRAVENOUS; SUBCUTANEOUS at 21:15

## 2019-07-03 RX ADMIN — CHLORHEXIDINE GLUCONATE 1 APPLICATION(S): 213 SOLUTION TOPICAL at 07:08

## 2019-07-03 NOTE — PROGRESS NOTE ADULT - SUBJECTIVE AND OBJECTIVE BOX
89 y/o female htn pafib severe mr for transcatheter mitral valve repair alert conversant stable hemodynamically improved with diuretics lungs good air entry heart 3/6 holosystolic apical murmur bp 130/80 discussed with all concerned

## 2019-07-03 NOTE — BRIEF OPERATIVE NOTE - NSICDXBRIEFPROCEDURE_GEN_ALL_CORE_FT
PROCEDURES:  Percutaneous transcatheter repair of mitral valve using multiple leaflet clips 03-Jul-2019 20:05:44  Allan Cuadra

## 2019-07-03 NOTE — PROGRESS NOTE ADULT - SUBJECTIVE AND OBJECTIVE BOX
CTICU  CRITICAL  CARE  attending     Hand off received 					   Pertinent clinical, laboratory, radiographic, hemodynamic, echocardiographic, respiratory data, microbiologic data and chart were reviewed and analyzed frequently throughout the course of the day and night  Patient seen and examined with CTS/ SH attending at bedside    Pt is a 89y , Female, s/p Percutaneous transcatheter repair of mitral valve using multiple leaflet clips ; for severe MR    Extubated in the hybrid OR room  post procedure; transient use of Bipap  on nasal canula O 2 now      , FAMILY HISTORY:  PAST MEDICAL & SURGICAL HISTORY:  HTN (hypertension)  Atrial fibrillation  Mitral regurgitation  Diastolic heart failure  H/O total hysterectomy  H/O exploratory laparotomy    Patient is a 89y old  Female who presents with a chief complaint of mitral regurgitation (03 Jul 2019 07:41)      14 system review was unremarkable  acute changes include acute respiratory failure  Vital signs, hemodynamic and respiratory parameters were reviewed from the bedside nursing flowsheet.  ICU Vital Signs Last 24 Hrs  T(C): 36.9 (03 Jul 2019 21:27), Max: 36.9 (03 Jul 2019 21:00)  T(F): 98.5 (03 Jul 2019 21:27), Max: 98.5 (03 Jul 2019 21:00)  HR: 58 (04 Jul 2019 00:30) (50 - 60)  BP: 91/66 (03 Jul 2019 21:45) (91/66 - 154/89)  BP(mean): 73 (03 Jul 2019 21:45) (73 - 112)  ABP: 124/64 (04 Jul 2019 00:30) (98/50 - 130/68)  ABP(mean): 86 (04 Jul 2019 00:30) (68 - 92)  RR: 13 (04 Jul 2019 00:30) (11 - 28)  SpO2: 100% (04 Jul 2019 00:30) (99% - 100%)    Adult Advanced Hemodynamics Last 24 Hrs  CVP(mm Hg): --  CVP(cm H2O): --  CO: --  CI: --  PA: --  PA(mean): --  PCWP: --  SVR: --  SVRI: --  PVR: --  PVRI: --, ABG - ( 03 Jul 2019 22:38 )  pH, Arterial: 7.47  pH, Blood: x     /  pCO2: 26    /  pO2: 208   / HCO3: 19    / Base Excess: -3.3  /  SaO2: 100                 Intake and output was reviewed and the fluid balance was calculated  Daily     Daily   I&O's Summary    02 Jul 2019 07:01  -  03 Jul 2019 07:00  --------------------------------------------------------  IN: 0 mL / OUT: 1500 mL / NET: -1500 mL    03 Jul 2019 07:01  -  04 Jul 2019 01:22  --------------------------------------------------------  IN: 340 mL / OUT: 2150 mL / NET: -1810 mL        All lines and drain sites were assessed  Glycemic trend was reviewedCAPILLARY BLOOD GLUCOSE        No acute change in mental status  Auscultation of the chest reveals equal bs  Abdomen is soft  Extremities are warm and well perfused  Wounds appear clean and unremarkable  Antibiotics are periop    labs  CBC Full  -  ( 03 Jul 2019 21:10 )  WBC Count : 8.89 K/uL  RBC Count : 4.69 M/uL  Hemoglobin : 14.4 g/dL  Hematocrit : 43.3 %  Platelet Count - Automated : 141 K/uL  Mean Cell Volume : 92.3 fl  Mean Cell Hemoglobin : 30.7 pg  Mean Cell Hemoglobin Concentration : 33.3 gm/dL  Auto Neutrophil # : 8.09 K/uL  Auto Lymphocyte # : 0.62 K/uL  Auto Monocyte # : 0.09 K/uL  Auto Eosinophil # : 0.02 K/uL  Auto Basophil # : 0.04 K/uL  Auto Neutrophil % : 91.1 %  Auto Lymphocyte % : 7.0 %  Auto Monocyte % : 1.0 %  Auto Eosinophil % : 0.2 %  Auto Basophil % : 0.4 %    07-03    140  |  105  |  14  ----------------------------<  106<H>  4.0   |  20<L>  |  0.83    Ca    9.0      03 Jul 2019 21:10  Phos  3.7     07-03  Mg     1.6     07-03    TPro  6.6  /  Alb  3.6  /  TBili  0.8  /  DBili  x   /  AST  26  /  ALT  15  /  AlkPhos  85  07-03    PT/INR - ( 03 Jul 2019 21:10 )   PT: 16.3 sec;   INR: 1.43          PTT - ( 03 Jul 2019 21:10 )  PTT:36.0 sec  The current medications were reviewed   MEDICATIONS  (STANDING):  aspirin enteric coated 81 milliGRAM(s) Oral daily  docusate sodium 100 milliGRAM(s) Oral three times a day  heparin  Injectable 5000 Unit(s) SubCutaneous every 8 hours  latanoprost 0.005% Ophthalmic Solution 1 Drop(s) Both EYES at bedtime  losartan 25 milliGRAM(s) Oral daily  pantoprazole    Tablet 40 milliGRAM(s) Oral before breakfast  senna 2 Tablet(s) Oral at bedtime  sodium chloride 0.9% lock flush 3 milliLiter(s) IV Push every 8 hours  sodium chloride 0.9%. 1000 milliLiter(s) (30 mL/Hr) IV Continuous <Continuous>  sodium chloride 0.9%. 1000 milliLiter(s) (10 mL/Hr) IV Continuous <Continuous>    MEDICATIONS  (PRN):  polyethylene glycol 3350 17 Gram(s) Oral daily PRN Constipation       PROBLEM LIST/ ASSESSMENT:  HEALTH ISSUES - PROBLEM Dx:  HTN (hypertension)  Atrial fibrillation  Mitral regurgitation  Diastolic heart failure      ,   Patient is a 89y old  Female who presents with a chief complaint of mitral regurgitation (03 Jul 2019 07:41)     s/p Percutaneous transcatheter repair of mitral valve using multiple leaflet clips ; for severe MR      My plan includes :  close hemodynamic, ventilatory and drain monitoring and management per post op routine    Monitor for arrhythmias and monitor parameters for organ perfusion  monitor neurologic status  Head of the bed should remain elevated to 45 deg .   chest PT and IS will be encouraged  monitor adequacy of oxygenation and ventilation and attempt to wean oxygen  Nutritional goals will be met using po eventually , ensure adequate caloric intake and montior the same  Stress ulcer and VTE prophylaxis will be achieved    Glycemic control is satisfactory  Electrolytes have been repleted as necessary and wound care has been carried out. Pain control has been achieved.   agressive physical therapy and early mobility and ambulation goals will be met   The family was updated about the course and plan  CRITICAL CARE TIME SPENT in evaluation and management, reassessments, review and interpretation of labs and x-rays, ventilator and hemodynamic management, formulating a plan and coordinating care: __55__ MIN.  Time does not include procedural time.  CTICU ATTENDING     					    Narayan Dominguez M.D.

## 2019-07-04 LAB
ALBUMIN SERPL ELPH-MCNC: 3.7 G/DL — SIGNIFICANT CHANGE UP (ref 3.3–5)
ALBUMIN SERPL ELPH-MCNC: 3.8 G/DL — SIGNIFICANT CHANGE UP (ref 3.3–5)
ALP SERPL-CCNC: 73 U/L — SIGNIFICANT CHANGE UP (ref 40–120)
ALP SERPL-CCNC: 82 U/L — SIGNIFICANT CHANGE UP (ref 40–120)
ALT FLD-CCNC: 14 U/L — SIGNIFICANT CHANGE UP (ref 10–45)
ALT FLD-CCNC: 15 U/L — SIGNIFICANT CHANGE UP (ref 10–45)
ANION GAP SERPL CALC-SCNC: 15 MMOL/L — SIGNIFICANT CHANGE UP (ref 5–17)
ANION GAP SERPL CALC-SCNC: 17 MMOL/L — SIGNIFICANT CHANGE UP (ref 5–17)
APTT BLD: 43.7 SEC — HIGH (ref 27.5–36.3)
APTT BLD: 51.1 SEC — HIGH (ref 27.5–36.3)
APTT BLD: >200 SEC — CRITICAL HIGH (ref 27.5–36.3)
AST SERPL-CCNC: 26 U/L — SIGNIFICANT CHANGE UP (ref 10–40)
AST SERPL-CCNC: 28 U/L — SIGNIFICANT CHANGE UP (ref 10–40)
BILIRUB SERPL-MCNC: 0.8 MG/DL — SIGNIFICANT CHANGE UP (ref 0.2–1.2)
BILIRUB SERPL-MCNC: 0.9 MG/DL — SIGNIFICANT CHANGE UP (ref 0.2–1.2)
BUN SERPL-MCNC: 17 MG/DL — SIGNIFICANT CHANGE UP (ref 7–23)
BUN SERPL-MCNC: 17 MG/DL — SIGNIFICANT CHANGE UP (ref 7–23)
CALCIUM SERPL-MCNC: 9 MG/DL — SIGNIFICANT CHANGE UP (ref 8.4–10.5)
CALCIUM SERPL-MCNC: 9 MG/DL — SIGNIFICANT CHANGE UP (ref 8.4–10.5)
CHLORIDE SERPL-SCNC: 103 MMOL/L — SIGNIFICANT CHANGE UP (ref 96–108)
CHLORIDE SERPL-SCNC: 104 MMOL/L — SIGNIFICANT CHANGE UP (ref 96–108)
CK MB CFR SERPL CALC: 5.7 NG/ML — SIGNIFICANT CHANGE UP (ref 0–6.7)
CK SERPL-CCNC: 241 U/L — HIGH (ref 25–170)
CO2 SERPL-SCNC: 18 MMOL/L — LOW (ref 22–31)
CO2 SERPL-SCNC: 20 MMOL/L — LOW (ref 22–31)
CREAT SERPL-MCNC: 0.87 MG/DL — SIGNIFICANT CHANGE UP (ref 0.5–1.3)
CREAT SERPL-MCNC: 0.95 MG/DL — SIGNIFICANT CHANGE UP (ref 0.5–1.3)
GAS PNL BLDA: SIGNIFICANT CHANGE UP
GAS PNL BLDA: SIGNIFICANT CHANGE UP
GLUCOSE SERPL-MCNC: 119 MG/DL — HIGH (ref 70–99)
GLUCOSE SERPL-MCNC: 123 MG/DL — HIGH (ref 70–99)
HCT VFR BLD CALC: 40.6 % — SIGNIFICANT CHANGE UP (ref 34.5–45)
HCT VFR BLD CALC: 42.2 % — SIGNIFICANT CHANGE UP (ref 34.5–45)
HGB BLD-MCNC: 13.4 G/DL — SIGNIFICANT CHANGE UP (ref 11.5–15.5)
HGB BLD-MCNC: 13.7 G/DL — SIGNIFICANT CHANGE UP (ref 11.5–15.5)
INR BLD: 1.31 — HIGH (ref 0.88–1.16)
INR BLD: 1.37 — HIGH (ref 0.88–1.16)
INR BLD: 1.4 — HIGH (ref 0.88–1.16)
MAGNESIUM SERPL-MCNC: 1.6 MG/DL — SIGNIFICANT CHANGE UP (ref 1.6–2.6)
MAGNESIUM SERPL-MCNC: 1.9 MG/DL — SIGNIFICANT CHANGE UP (ref 1.6–2.6)
MCHC RBC-ENTMCNC: 30 PG — SIGNIFICANT CHANGE UP (ref 27–34)
MCHC RBC-ENTMCNC: 30.2 PG — SIGNIFICANT CHANGE UP (ref 27–34)
MCHC RBC-ENTMCNC: 32.5 GM/DL — SIGNIFICANT CHANGE UP (ref 32–36)
MCHC RBC-ENTMCNC: 33 GM/DL — SIGNIFICANT CHANGE UP (ref 32–36)
MCV RBC AUTO: 91.6 FL — SIGNIFICANT CHANGE UP (ref 80–100)
MCV RBC AUTO: 92.3 FL — SIGNIFICANT CHANGE UP (ref 80–100)
NRBC # BLD: 0 /100 WBCS — SIGNIFICANT CHANGE UP (ref 0–0)
NRBC # BLD: 0 /100 WBCS — SIGNIFICANT CHANGE UP (ref 0–0)
PHOSPHATE SERPL-MCNC: 4 MG/DL — SIGNIFICANT CHANGE UP (ref 2.5–4.5)
PHOSPHATE SERPL-MCNC: 4.3 MG/DL — SIGNIFICANT CHANGE UP (ref 2.5–4.5)
PLATELET # BLD AUTO: 161 K/UL — SIGNIFICANT CHANGE UP (ref 150–400)
PLATELET # BLD AUTO: 171 K/UL — SIGNIFICANT CHANGE UP (ref 150–400)
POTASSIUM SERPL-MCNC: 4.2 MMOL/L — SIGNIFICANT CHANGE UP (ref 3.5–5.3)
POTASSIUM SERPL-MCNC: 4.2 MMOL/L — SIGNIFICANT CHANGE UP (ref 3.5–5.3)
POTASSIUM SERPL-SCNC: 4.2 MMOL/L — SIGNIFICANT CHANGE UP (ref 3.5–5.3)
POTASSIUM SERPL-SCNC: 4.2 MMOL/L — SIGNIFICANT CHANGE UP (ref 3.5–5.3)
PROT SERPL-MCNC: 6.2 G/DL — SIGNIFICANT CHANGE UP (ref 6–8.3)
PROT SERPL-MCNC: 6.4 G/DL — SIGNIFICANT CHANGE UP (ref 6–8.3)
PROTHROM AB SERPL-ACNC: 14.9 SEC — HIGH (ref 10–12.9)
PROTHROM AB SERPL-ACNC: 15.6 SEC — HIGH (ref 10–12.9)
PROTHROM AB SERPL-ACNC: 16 SEC — HIGH (ref 10–12.9)
RBC # BLD: 4.43 M/UL — SIGNIFICANT CHANGE UP (ref 3.8–5.2)
RBC # BLD: 4.57 M/UL — SIGNIFICANT CHANGE UP (ref 3.8–5.2)
RBC # FLD: 13.5 % — SIGNIFICANT CHANGE UP (ref 10.3–14.5)
RBC # FLD: 13.6 % — SIGNIFICANT CHANGE UP (ref 10.3–14.5)
SODIUM SERPL-SCNC: 138 MMOL/L — SIGNIFICANT CHANGE UP (ref 135–145)
SODIUM SERPL-SCNC: 139 MMOL/L — SIGNIFICANT CHANGE UP (ref 135–145)
TROPONIN T SERPL-MCNC: 0.02 NG/ML — HIGH (ref 0–0.01)
WBC # BLD: 8 K/UL — SIGNIFICANT CHANGE UP (ref 3.8–10.5)
WBC # BLD: 8.43 K/UL — SIGNIFICANT CHANGE UP (ref 3.8–10.5)
WBC # FLD AUTO: 8 K/UL — SIGNIFICANT CHANGE UP (ref 3.8–10.5)
WBC # FLD AUTO: 8.43 K/UL — SIGNIFICANT CHANGE UP (ref 3.8–10.5)

## 2019-07-04 PROCEDURE — 71045 X-RAY EXAM CHEST 1 VIEW: CPT | Mod: 26

## 2019-07-04 PROCEDURE — 93010 ELECTROCARDIOGRAM REPORT: CPT

## 2019-07-04 RX ORDER — ALBUMIN HUMAN 25 %
250 VIAL (ML) INTRAVENOUS ONCE
Refills: 0 | Status: COMPLETED | OUTPATIENT
Start: 2019-07-04 | End: 2019-07-04

## 2019-07-04 RX ORDER — MAGNESIUM OXIDE 400 MG ORAL TABLET 241.3 MG
800 TABLET ORAL ONCE
Refills: 0 | Status: COMPLETED | OUTPATIENT
Start: 2019-07-04 | End: 2019-07-04

## 2019-07-04 RX ORDER — HEPARIN SODIUM 5000 [USP'U]/ML
800 INJECTION INTRAVENOUS; SUBCUTANEOUS
Qty: 25000 | Refills: 0 | Status: DISCONTINUED | OUTPATIENT
Start: 2019-07-04 | End: 2019-07-05

## 2019-07-04 RX ADMIN — PANTOPRAZOLE SODIUM 40 MILLIGRAM(S): 20 TABLET, DELAYED RELEASE ORAL at 06:09

## 2019-07-04 RX ADMIN — MAGNESIUM OXIDE 400 MG ORAL TABLET 800 MILLIGRAM(S): 241.3 TABLET ORAL at 06:08

## 2019-07-04 RX ADMIN — LATANOPROST 1 DROP(S): 0.05 SOLUTION/ DROPS OPHTHALMIC; TOPICAL at 21:59

## 2019-07-04 RX ADMIN — HEPARIN SODIUM 5000 UNIT(S): 5000 INJECTION INTRAVENOUS; SUBCUTANEOUS at 15:33

## 2019-07-04 RX ADMIN — HEPARIN SODIUM 8 UNIT(S)/HR: 5000 INJECTION INTRAVENOUS; SUBCUTANEOUS at 17:27

## 2019-07-04 RX ADMIN — HEPARIN SODIUM 5000 UNIT(S): 5000 INJECTION INTRAVENOUS; SUBCUTANEOUS at 06:07

## 2019-07-04 RX ADMIN — LATANOPROST 1 DROP(S): 0.05 SOLUTION/ DROPS OPHTHALMIC; TOPICAL at 01:03

## 2019-07-04 RX ADMIN — Medication 100 MILLIGRAM(S): at 15:33

## 2019-07-04 RX ADMIN — SODIUM CHLORIDE 3 MILLILITER(S): 9 INJECTION INTRAMUSCULAR; INTRAVENOUS; SUBCUTANEOUS at 22:31

## 2019-07-04 RX ADMIN — SENNA PLUS 2 TABLET(S): 8.6 TABLET ORAL at 21:21

## 2019-07-04 RX ADMIN — Medication 100 MILLIGRAM(S): at 21:21

## 2019-07-04 RX ADMIN — SODIUM CHLORIDE 3 MILLILITER(S): 9 INJECTION INTRAMUSCULAR; INTRAVENOUS; SUBCUTANEOUS at 05:53

## 2019-07-04 RX ADMIN — Medication 100 MILLIGRAM(S): at 06:08

## 2019-07-04 RX ADMIN — Medication 81 MILLIGRAM(S): at 12:00

## 2019-07-04 RX ADMIN — SODIUM CHLORIDE 3 MILLILITER(S): 9 INJECTION INTRAMUSCULAR; INTRAVENOUS; SUBCUTANEOUS at 15:07

## 2019-07-04 RX ADMIN — LOSARTAN POTASSIUM 25 MILLIGRAM(S): 100 TABLET, FILM COATED ORAL at 12:00

## 2019-07-04 RX ADMIN — Medication 50 MILLILITER(S): at 02:22

## 2019-07-04 NOTE — PHYSICAL THERAPY INITIAL EVALUATION ADULT - GAIT DEVIATIONS NOTED, PT EVAL
decreased velocity of limb motion/decreased step length/decreased stride length/decreased gait speed

## 2019-07-04 NOTE — PHYSICAL THERAPY INITIAL EVALUATION ADULT - PERTINENT HX OF CURRENT PROBLEM, REHAB EVAL
89 y/o female with hx of HTN, new afib (on eliquis), hx GI bleed, chronic diastolic heart failure with known severe MR who was referred for surgical evaluation.

## 2019-07-04 NOTE — PROGRESS NOTE ADULT - ASSESSMENT
s/p Mitral Valve repair     doing well post procedure     ROSMERY Bonilla s/p Mitral Valve repair     doing well post procedure     cont post procedure care     ROSMERY Bonilla

## 2019-07-04 NOTE — PROGRESS NOTE ADULT - SUBJECTIVE AND OBJECTIVE BOX
Patient discussed on morning rounds with Dr. Rojas    Operation / Date: 7/3/19: MV Repair, transcatheter with russell cookie clasp x 2    SUBJECTIVE ASSESSMENT:  89y Female seen at bedside this morning.         Vital Signs Last 24 Hrs  T(C): 36.6 (04 Jul 2019 17:00), Max: 37 (04 Jul 2019 05:01)  T(F): 97.8 (04 Jul 2019 17:00), Max: 98.6 (04 Jul 2019 05:01)  HR: 60 (04 Jul 2019 16:24) (50 - 70)  BP: 102/50 (04 Jul 2019 16:24) (90/50 - 114/71)  BP(mean): 69 (04 Jul 2019 16:24) (64 - 96)  RR: 16 (04 Jul 2019 16:24) (11 - 32)  SpO2: 97% (04 Jul 2019 16:24) (96% - 100%)  I&O's Detail    03 Jul 2019 07:01  -  04 Jul 2019 07:00  --------------------------------------------------------  IN:    Albumin 5%  - 250 mL: 250 mL    Oral Fluid: 350 mL    Sodium Chloride 0.9% IV Bolus: 250 mL    sodium chloride 0.9%.: 90 mL  Total IN: 940 mL    OUT:    Voided: 2150 mL  Total OUT: 2150 mL    Total NET: -1210 mL      04 Jul 2019 07:01  -  04 Jul 2019 19:12  --------------------------------------------------------  IN:    Oral Fluid: 600 mL  Total IN: 600 mL    OUT:    Voided: 200 mL  Total OUT: 200 mL    Total NET: 400 mL          CHEST TUBE:  Yes/No. AIR LEAKS: Yes/No. Suction / H2O SEAL.   PATRICK DRAIN:  Yes/No.  EPICARDIAL WIRES: Yes/No.  TIE DOWNS: Yes/No.  SMITH: Yes/No.    PHYSICAL EXAM:    General:     Neurological:    Cardiovascular:    Respiratory:    Gastrointestinal:    Extremities:    Vascular:    Incision Sites:    LABS:                        13.4   8.43  )-----------( 171      ( 04 Jul 2019 08:47 )             40.6       COUMADIN:  Yes/No. REASON: .    PT/INR - ( 04 Jul 2019 08:47 )   PT: 16.0 sec;   INR: 1.40          PTT - ( 04 Jul 2019 08:47 )  PTT:51.1 sec    07-04    139  |  104  |  17  ----------------------------<  123<H>  4.2   |  18<L>  |  0.87    Ca    9.0      04 Jul 2019 08:47  Phos  4.0     07-04  Mg     1.9     07-04    TPro  6.2  /  Alb  3.8  /  TBili  0.8  /  DBili  x   /  AST  26  /  ALT  14  /  AlkPhos  73  07-04          MEDICATIONS  (STANDING):  aspirin enteric coated 81 milliGRAM(s) Oral daily  docusate sodium 100 milliGRAM(s) Oral three times a day  heparin  Infusion 800 Unit(s)/Hr (8 mL/Hr) IV Continuous <Continuous>  latanoprost 0.005% Ophthalmic Solution 1 Drop(s) Both EYES at bedtime  losartan 25 milliGRAM(s) Oral daily  pantoprazole    Tablet 40 milliGRAM(s) Oral before breakfast  senna 2 Tablet(s) Oral at bedtime  sodium chloride 0.9% lock flush 3 milliLiter(s) IV Push every 8 hours  sodium chloride 0.9%. 1000 milliLiter(s) (30 mL/Hr) IV Continuous <Continuous>  sodium chloride 0.9%. 1000 milliLiter(s) (10 mL/Hr) IV Continuous <Continuous>    MEDICATIONS  (PRN):  polyethylene glycol 3350 17 Gram(s) Oral daily PRN Constipation        RADIOLOGY & ADDITIONAL TESTS: Patient discussed on morning rounds with Dr. Rojas    Operation / Date: 7/3/19: MV Repair, transcatheter with russell cookie clasp x 2    SUBJECTIVE ASSESSMENT:  89y Female seen at bedside this morning. She states she is feeling well with no acute complaints. Was able to walk in the hallway without difficulty         Vital Signs Last 24 Hrs  T(C): 36.6 (04 Jul 2019 17:00), Max: 37 (04 Jul 2019 05:01)  T(F): 97.8 (04 Jul 2019 17:00), Max: 98.6 (04 Jul 2019 05:01)  HR: 60 (04 Jul 2019 16:24) (50 - 70)  BP: 102/50 (04 Jul 2019 16:24) (90/50 - 114/71)  BP(mean): 69 (04 Jul 2019 16:24) (64 - 96)  RR: 16 (04 Jul 2019 16:24) (11 - 32)  SpO2: 97% (04 Jul 2019 16:24) (96% - 100%)  I&O's Detail    03 Jul 2019 07:01  -  04 Jul 2019 07:00  --------------------------------------------------------  IN:    Albumin 5%  - 250 mL: 250 mL    Oral Fluid: 350 mL    Sodium Chloride 0.9% IV Bolus: 250 mL    sodium chloride 0.9%.: 90 mL  Total IN: 940 mL    OUT:    Voided: 2150 mL  Total OUT: 2150 mL    Total NET: -1210 mL      04 Jul 2019 07:01  -  04 Jul 2019 19:12  --------------------------------------------------------  IN:    Oral Fluid: 600 mL  Total IN: 600 mL    OUT:    Voided: 200 mL  Total OUT: 200 mL    Total NET: 400 mL          CHEST TUBE:  No  PATRICK DRAIN:  No.  EPICARDIAL WIRES: No.  TIE DOWNS: Mattress suture out  SMITH: No    PHYSICAL EXAM:    General: NAD, stable    Neurological: A&O x3, no focal deficits     Cardiovascular: RRR, normal S1, S2     Respiratory:    Gastrointestinal:    Extremities:    Vascular:    Incision Sites:    LABS:                        13.4   8.43  )-----------( 171      ( 04 Jul 2019 08:47 )             40.6       COUMADIN:  Yes/No. REASON: .    PT/INR - ( 04 Jul 2019 08:47 )   PT: 16.0 sec;   INR: 1.40          PTT - ( 04 Jul 2019 08:47 )  PTT:51.1 sec    07-04    139  |  104  |  17  ----------------------------<  123<H>  4.2   |  18<L>  |  0.87    Ca    9.0      04 Jul 2019 08:47  Phos  4.0     07-04  Mg     1.9     07-04    TPro  6.2  /  Alb  3.8  /  TBili  0.8  /  DBili  x   /  AST  26  /  ALT  14  /  AlkPhos  73  07-04          MEDICATIONS  (STANDING):  aspirin enteric coated 81 milliGRAM(s) Oral daily  docusate sodium 100 milliGRAM(s) Oral three times a day  heparin  Infusion 800 Unit(s)/Hr (8 mL/Hr) IV Continuous <Continuous>  latanoprost 0.005% Ophthalmic Solution 1 Drop(s) Both EYES at bedtime  losartan 25 milliGRAM(s) Oral daily  pantoprazole    Tablet 40 milliGRAM(s) Oral before breakfast  senna 2 Tablet(s) Oral at bedtime  sodium chloride 0.9% lock flush 3 milliLiter(s) IV Push every 8 hours  sodium chloride 0.9%. 1000 milliLiter(s) (30 mL/Hr) IV Continuous <Continuous>  sodium chloride 0.9%. 1000 milliLiter(s) (10 mL/Hr) IV Continuous <Continuous>    MEDICATIONS  (PRN):  polyethylene glycol 3350 17 Gram(s) Oral daily PRN Constipation        RADIOLOGY & ADDITIONAL TESTS: Patient discussed on morning rounds with Dr. Rojas    Operation / Date: 7/3/19: MV Repair, transcatheter with russell cookie clasp x 2    SUBJECTIVE ASSESSMENT:  89y Female seen at bedside this morning. She states she is feeling well with no acute complaints. Was able to walk in the hallway without difficulty         Vital Signs Last 24 Hrs  T(C): 36.6 (04 Jul 2019 17:00), Max: 37 (04 Jul 2019 05:01)  T(F): 97.8 (04 Jul 2019 17:00), Max: 98.6 (04 Jul 2019 05:01)  HR: 60 (04 Jul 2019 16:24) (50 - 70)  BP: 102/50 (04 Jul 2019 16:24) (90/50 - 114/71)  BP(mean): 69 (04 Jul 2019 16:24) (64 - 96)  RR: 16 (04 Jul 2019 16:24) (11 - 32)  SpO2: 97% (04 Jul 2019 16:24) (96% - 100%)  I&O's Detail    03 Jul 2019 07:01  -  04 Jul 2019 07:00  --------------------------------------------------------  IN:    Albumin 5%  - 250 mL: 250 mL    Oral Fluid: 350 mL    Sodium Chloride 0.9% IV Bolus: 250 mL    sodium chloride 0.9%.: 90 mL  Total IN: 940 mL    OUT:    Voided: 2150 mL  Total OUT: 2150 mL    Total NET: -1210 mL      04 Jul 2019 07:01  -  04 Jul 2019 19:12  --------------------------------------------------------  IN:    Oral Fluid: 600 mL  Total IN: 600 mL    OUT:    Voided: 200 mL  Total OUT: 200 mL    Total NET: 400 mL          CHEST TUBE:  No  PATRICK DRAIN:  No.  EPICARDIAL WIRES: No.  TIE DOWNS: Mattress suture out  SMITH: No    PHYSICAL EXAM:    General: NAD, stable    Neurological: A&O x3, no focal deficits     Cardiovascular: RRR, normal S1, S2     Respiratory: CTA b/l    Gastrointestinal: +BS, soft, nontender    Extremities: 1+ edema ankles b/l, no calf tenderness     Vascular: +2 pulses b/l, R groin stitch removed, no hematoma     Incision Sites: b/l groin sites stable    LABS:                        13.4   8.43  )-----------( 171      ( 04 Jul 2019 08:47 )             40.6       COUMADIN:  No. REASON: eliquis    PT/INR - ( 04 Jul 2019 08:47 )   PT: 16.0 sec;   INR: 1.40          PTT - ( 04 Jul 2019 08:47 )  PTT:51.1 sec    07-04    139  |  104  |  17  ----------------------------<  123<H>  4.2   |  18<L>  |  0.87    Ca    9.0      04 Jul 2019 08:47  Phos  4.0     07-04  Mg     1.9     07-04    TPro  6.2  /  Alb  3.8  /  TBili  0.8  /  DBili  x   /  AST  26  /  ALT  14  /  AlkPhos  73  07-04          MEDICATIONS  (STANDING):  aspirin enteric coated 81 milliGRAM(s) Oral daily  docusate sodium 100 milliGRAM(s) Oral three times a day  heparin  Infusion 800 Unit(s)/Hr (8 mL/Hr) IV Continuous <Continuous>  latanoprost 0.005% Ophthalmic Solution 1 Drop(s) Both EYES at bedtime  losartan 25 milliGRAM(s) Oral daily  pantoprazole    Tablet 40 milliGRAM(s) Oral before breakfast  senna 2 Tablet(s) Oral at bedtime  sodium chloride 0.9% lock flush 3 milliLiter(s) IV Push every 8 hours  sodium chloride 0.9%. 1000 milliLiter(s) (30 mL/Hr) IV Continuous <Continuous>  sodium chloride 0.9%. 1000 milliLiter(s) (10 mL/Hr) IV Continuous <Continuous>    MEDICATIONS  (PRN):  polyethylene glycol 3350 17 Gram(s) Oral daily PRN Constipation        RADIOLOGY & ADDITIONAL TESTS:    A:  89 y/o female with hx of HTN, new afib (on eliquis), hx GI bleed, chronic diastolic heart failure with known severe MR. She was evaluated by Dr. Rojas and found to be a candidate for transcatheter intervention. She reports FELIZ and worsening palpitations when walking about one block. She underwent a preop workup which included a MESFIN showing severely dilated left and right atria, mitral valve prolapse with flail posterior leaflet ( P2 and P3 scallops). Severe eccentric anteriorly directed mitral valve regurgitation. Aortic sclerosis with mild AI, severe TR and severe pulmonary hypertension, PASP 60mmhg. She underwent a transcatheter MV repair on 7/3 and is now POD#1      Neuro:          CVS:  - Hx of HTN, will f/u restarting home losartan and metoprolol  - Afib, on eliquis at home. Will restart AC when r/o no pericardial effusion on echo today  - formal echo to be done on 7/5    Pulm:  - on RA, xray stable  - c/w IS and ambulation    GI:  - on PO diet, c/w GI ppx    :  - Cr stable, (17/0.87) cont to monitor post contrast  - lasix given preop for LE edema, f/u if needed post op and as outpatient, no urgent need right now      Heme:  - AC to start after echo performed  - c/w SCD for DVT ppx    ID:  - periop abx, WBC 8.4  - no signs of SIRS, cont to monitor      Endo:  - no hx of DM,      Dispo:  home when medically ready Patient discussed on morning rounds with Dr. Rojas    Operation / Date: 7/3/19: MV Repair, transcatheter with russell cookie clasp x 2    SUBJECTIVE ASSESSMENT:  89y Female seen at bedside this morning. She states she is feeling well with no acute complaints. Was able to walk in the hallway without difficulty         Vital Signs Last 24 Hrs  T(C): 36.6 (04 Jul 2019 17:00), Max: 37 (04 Jul 2019 05:01)  T(F): 97.8 (04 Jul 2019 17:00), Max: 98.6 (04 Jul 2019 05:01)  HR: 60 (04 Jul 2019 16:24) (50 - 70)  BP: 102/50 (04 Jul 2019 16:24) (90/50 - 114/71)  BP(mean): 69 (04 Jul 2019 16:24) (64 - 96)  RR: 16 (04 Jul 2019 16:24) (11 - 32)  SpO2: 97% (04 Jul 2019 16:24) (96% - 100%)  I&O's Detail    03 Jul 2019 07:01  -  04 Jul 2019 07:00  --------------------------------------------------------  IN:    Albumin 5%  - 250 mL: 250 mL    Oral Fluid: 350 mL    Sodium Chloride 0.9% IV Bolus: 250 mL    sodium chloride 0.9%.: 90 mL  Total IN: 940 mL    OUT:    Voided: 2150 mL  Total OUT: 2150 mL    Total NET: -1210 mL      04 Jul 2019 07:01  -  04 Jul 2019 19:12  --------------------------------------------------------  IN:    Oral Fluid: 600 mL  Total IN: 600 mL    OUT:    Voided: 200 mL  Total OUT: 200 mL    Total NET: 400 mL          CHEST TUBE:  No  PATRICK DRAIN:  No.  EPICARDIAL WIRES: No.  TIE DOWNS: Mattress suture out  SMITH: No    PHYSICAL EXAM:    General: NAD, stable    Neurological: A&O x3, no focal deficits     Cardiovascular: RRR, normal S1, S2     Respiratory: CTA b/l    Gastrointestinal: +BS, soft, nontender    Extremities: 1+ edema ankles b/l, no calf tenderness     Vascular: +2 pulses b/l, R groin stitch removed, no hematoma     Incision Sites: b/l groin sites stable    LABS:                        13.4   8.43  )-----------( 171      ( 04 Jul 2019 08:47 )             40.6       COUMADIN:  No. REASON: eliquis    PT/INR - ( 04 Jul 2019 08:47 )   PT: 16.0 sec;   INR: 1.40          PTT - ( 04 Jul 2019 08:47 )  PTT:51.1 sec    07-04    139  |  104  |  17  ----------------------------<  123<H>  4.2   |  18<L>  |  0.87    Ca    9.0      04 Jul 2019 08:47  Phos  4.0     07-04  Mg     1.9     07-04    TPro  6.2  /  Alb  3.8  /  TBili  0.8  /  DBili  x   /  AST  26  /  ALT  14  /  AlkPhos  73  07-04          MEDICATIONS  (STANDING):  aspirin enteric coated 81 milliGRAM(s) Oral daily  docusate sodium 100 milliGRAM(s) Oral three times a day  heparin  Infusion 800 Unit(s)/Hr (8 mL/Hr) IV Continuous <Continuous>  latanoprost 0.005% Ophthalmic Solution 1 Drop(s) Both EYES at bedtime  losartan 25 milliGRAM(s) Oral daily  pantoprazole    Tablet 40 milliGRAM(s) Oral before breakfast  senna 2 Tablet(s) Oral at bedtime  sodium chloride 0.9% lock flush 3 milliLiter(s) IV Push every 8 hours  sodium chloride 0.9%. 1000 milliLiter(s) (30 mL/Hr) IV Continuous <Continuous>  sodium chloride 0.9%. 1000 milliLiter(s) (10 mL/Hr) IV Continuous <Continuous>    MEDICATIONS  (PRN):  polyethylene glycol 3350 17 Gram(s) Oral daily PRN Constipation        RADIOLOGY & ADDITIONAL TESTS:    A:  89 y/o female with hx of HTN, new afib (on eliquis), hx GI bleed, chronic diastolic heart failure with known severe MR. She was evaluated by Dr. Rojas and found to be a candidate for transcatheter intervention. She reports FELIZ and worsening palpitations when walking about one block. She underwent a preop workup which included a MESFIN showing severely dilated left and right atria, mitral valve prolapse with flail posterior leaflet ( P2 and P3 scallops). Severe eccentric anteriorly directed mitral valve regurgitation. Aortic sclerosis with mild AI, severe TR and severe pulmonary hypertension, PASP 60mmhg. She underwent a transcatheter MV repair on 7/3 and is now POD#1      Neuro:          CVS:  - Hx of HTN, home losartan on and f/u restarting metoprolol  - Afib, on eliquis at home. Will restart AC when r/o no pericardial effusion on echo today  - formal echo to be done on 7/5    Pulm:  - on RA, xray stable  - c/w IS and ambulation    GI:  - on PO diet, c/w GI ppx    :  - Cr stable, (17/0.87) cont to monitor post contrast  - lasix given preop for LE edema, f/u if needed post op and as outpatient, no urgent need right now      Heme:  - AC to start after echo performed  - c/w SCD for DVT ppx    ID:  - periop abx, WBC 8.4  - no signs of SIRS, cont to monitor      Endo:  - no hx of DM,      Dispo:  home when medically ready Patient discussed on morning rounds with Dr. Rojas    Operation / Date: 7/3/19: MV Repair, transcatheter with russell cookie clasp x 2    SUBJECTIVE ASSESSMENT:  89[facility]. Patient complained of "a black curtain diagonally covering my vision in my right eye." Patient states she never experienced this before. She states it lasted for 30 seconds and then returned back to baseline. Denies chest pain, SOB, palpitations, hemopytsis, N/V/D, abdominal pain, dizziness, fever or chills. Patient is ambulating, tolerating PO diet, and voiding spontaneously.         Vital Signs Last 24 Hrs  T(C): 36.6 (04 Jul 2019 17:00), Max: 37 (04 Jul 2019 05:01)  T(F): 97.8 (04 Jul 2019 17:00), Max: 98.6 (04 Jul 2019 05:01)  HR: 60 (04 Jul 2019 16:24) (50 - 70)  BP: 102/50 (04 Jul 2019 16:24) (90/50 - 114/71)  BP(mean): 69 (04 Jul 2019 16:24) (64 - 96)  RR: 16 (04 Jul 2019 16:24) (11 - 32)  SpO2: 97% (04 Jul 2019 16:24) (96% - 100%)  I&O's Detail    03 Jul 2019 07:01  -  04 Jul 2019 07:00  --------------------------------------------------------  IN:    Albumin 5%  - 250 mL: 250 mL    Oral Fluid: 350 mL    Sodium Chloride 0.9% IV Bolus: 250 mL    sodium chloride 0.9%.: 90 mL  Total IN: 940 mL    OUT:    Voided: 2150 mL  Total OUT: 2150 mL    Total NET: -1210 mL      04 Jul 2019 07:01  -  04 Jul 2019 19:12  --------------------------------------------------------  IN:    Oral Fluid: 600 mL  Total IN: 600 mL    OUT:    Voided: 200 mL  Total OUT: 200 mL    Total NET: 400 mL          CHEST TUBE:  No  PATRICK DRAIN:  No.  EPICARDIAL WIRES: No.  TIE DOWNS: Mattress suture out  SMITH: No    PHYSICAL EXAM:    General: NAD, stable    Neurological: A&O x3, no focal deficits     Cardiovascular: RRR, normal S1, S2     Respiratory: CTA b/l    Gastrointestinal: +BS, soft, nontender    Extremities: 1+ edema ankles b/l, no calf tenderness     Vascular: +2 pulses b/l, R groin stitch removed, no hematoma     Incision Sites: b/l groin sites stable    LABS:                        13.4   8.43  )-----------( 171      ( 04 Jul 2019 08:47 )             40.6       COUMADIN:  No. REASON: eliquis    PT/INR - ( 04 Jul 2019 08:47 )   PT: 16.0 sec;   INR: 1.40          PTT - ( 04 Jul 2019 08:47 )  PTT:51.1 sec    07-04    139  |  104  |  17  ----------------------------<  123<H>  4.2   |  18<L>  |  0.87    Ca    9.0      04 Jul 2019 08:47  Phos  4.0     07-04  Mg     1.9     07-04    TPro  6.2  /  Alb  3.8  /  TBili  0.8  /  DBili  x   /  AST  26  /  ALT  14  /  AlkPhos  73  07-04          MEDICATIONS  (STANDING):  aspirin enteric coated 81 milliGRAM(s) Oral daily  docusate sodium 100 milliGRAM(s) Oral three times a day  heparin  Infusion 800 Unit(s)/Hr (8 mL/Hr) IV Continuous <Continuous>  latanoprost 0.005% Ophthalmic Solution 1 Drop(s) Both EYES at bedtime  losartan 25 milliGRAM(s) Oral daily  pantoprazole    Tablet 40 milliGRAM(s) Oral before breakfast  senna 2 Tablet(s) Oral at bedtime  sodium chloride 0.9% lock flush 3 milliLiter(s) IV Push every 8 hours  sodium chloride 0.9%. 1000 milliLiter(s) (30 mL/Hr) IV Continuous <Continuous>  sodium chloride 0.9%. 1000 milliLiter(s) (10 mL/Hr) IV Continuous <Continuous>    MEDICATIONS  (PRN):  polyethylene glycol 3350 17 Gram(s) Oral daily PRN Constipation        RADIOLOGY & ADDITIONAL TESTS:

## 2019-07-04 NOTE — PROGRESS NOTE ADULT - ASSESSMENT
89 y/o female with hx of HTN, new afib (on eliquis), hx GI bleed, chronic diastolic heart failure with known severe MR. She was evaluated by Dr. Rojas and found to be a candidate for transcatheter intervention. She reports FELIZ and worsening palpitations when walking about one block. She underwent a preop workup which included a MESFIN showing severely dilated left and right atria, mitral valve prolapse with flail posterior leaflet ( P2 and P3 scallops). Severe eccentric anteriorly directed mitral valve regurgitation. Aortic sclerosis with mild AI, severe TR and severe pulmonary hypertension, PASP 60mmhg. She underwent a transcatheter MV repair on 7/3/19 and is now POD#1. Episode of possible amaurosis fugax, now resolved, started on heparin gtt, optho consulted, follow up recs.      Neuro: No focal deficits. No delirium. See below section.     HEENT: History of corneal repair ~10 years ago.   - Possible amaurosis fugax episode today. Completely resolved after 30 seconds.   - Dr. Rojas, Dr. Maldonado aware.   - Started on heparin gtt per Dr. Rojas. F/u PTT at 22:00.   - Optho consulted, will see patient early in AM.   - Patient states no known history of any carotid disease, appears to have no carotid dopplers preop, will order for AM.     CVS:  - Hx of HTN, home losartan not started for soft BPs and f/u restarting metoprolol  - Afib, on eliquis at home. Will restart AC when r/o no pericardial effusion on echo today  - formal echo to be done on 7/5    Pulm:  - on RA, xray stable  - c/w IS and ambulation    GI:  - on PO diet, c/w GI ppx    :  - Cr stable, (17/0.87) cont to monitor post contrast  - lasix given preop for LE edema, f/u if needed post op and as outpatient, no urgent need right now      Heme:  - Start heparin gtt per Dr. Rojas. When echo performed, if no pericardial effusion, transition to eliquis.   - F/u PTT at 22:00.   - c/w SCD for DVT ppx    ID:  - periop abx, WBC 8.4  - no signs of SIRS, cont to monitor      Endo:  - no hx of DM  - A1c: 5.3    Dispo:  home when medically ready

## 2019-07-05 ENCOUNTER — TRANSCRIPTION ENCOUNTER (OUTPATIENT)
Age: 84
End: 2019-07-05

## 2019-07-05 LAB
ANION GAP SERPL CALC-SCNC: 10 MMOL/L — SIGNIFICANT CHANGE UP (ref 5–17)
APTT BLD: 45.1 SEC — HIGH (ref 27.5–36.3)
APTT BLD: >200 SEC — CRITICAL HIGH (ref 27.5–36.3)
BASOPHILS # BLD AUTO: 0.03 K/UL — SIGNIFICANT CHANGE UP (ref 0–0.2)
BASOPHILS NFR BLD AUTO: 0.5 % — SIGNIFICANT CHANGE UP (ref 0–2)
BUN SERPL-MCNC: 21 MG/DL — SIGNIFICANT CHANGE UP (ref 7–23)
CALCIUM SERPL-MCNC: 9.2 MG/DL — SIGNIFICANT CHANGE UP (ref 8.4–10.5)
CHLORIDE SERPL-SCNC: 107 MMOL/L — SIGNIFICANT CHANGE UP (ref 96–108)
CK MB CFR SERPL CALC: 6.8 NG/ML — HIGH (ref 0–6.7)
CK SERPL-CCNC: 395 U/L — HIGH (ref 25–170)
CO2 SERPL-SCNC: 26 MMOL/L — SIGNIFICANT CHANGE UP (ref 22–31)
CREAT SERPL-MCNC: 1.04 MG/DL — SIGNIFICANT CHANGE UP (ref 0.5–1.3)
EOSINOPHIL # BLD AUTO: 0.13 K/UL — SIGNIFICANT CHANGE UP (ref 0–0.5)
EOSINOPHIL NFR BLD AUTO: 2.1 % — SIGNIFICANT CHANGE UP (ref 0–6)
GLUCOSE SERPL-MCNC: 106 MG/DL — HIGH (ref 70–99)
HCT VFR BLD CALC: 37.9 % — SIGNIFICANT CHANGE UP (ref 34.5–45)
HGB BLD-MCNC: 12.6 G/DL — SIGNIFICANT CHANGE UP (ref 11.5–15.5)
IMM GRANULOCYTES NFR BLD AUTO: 0.3 % — SIGNIFICANT CHANGE UP (ref 0–1.5)
INR BLD: 1.34 — HIGH (ref 0.88–1.16)
LYMPHOCYTES # BLD AUTO: 1.04 K/UL — SIGNIFICANT CHANGE UP (ref 1–3.3)
LYMPHOCYTES # BLD AUTO: 16.5 % — SIGNIFICANT CHANGE UP (ref 13–44)
MAGNESIUM SERPL-MCNC: 2 MG/DL — SIGNIFICANT CHANGE UP (ref 1.6–2.6)
MCHC RBC-ENTMCNC: 30.6 PG — SIGNIFICANT CHANGE UP (ref 27–34)
MCHC RBC-ENTMCNC: 33.2 GM/DL — SIGNIFICANT CHANGE UP (ref 32–36)
MCV RBC AUTO: 92 FL — SIGNIFICANT CHANGE UP (ref 80–100)
MONOCYTES # BLD AUTO: 0.51 K/UL — SIGNIFICANT CHANGE UP (ref 0–0.9)
MONOCYTES NFR BLD AUTO: 8.1 % — SIGNIFICANT CHANGE UP (ref 2–14)
NEUTROPHILS # BLD AUTO: 4.56 K/UL — SIGNIFICANT CHANGE UP (ref 1.8–7.4)
NEUTROPHILS NFR BLD AUTO: 72.5 % — SIGNIFICANT CHANGE UP (ref 43–77)
NRBC # BLD: 0 /100 WBCS — SIGNIFICANT CHANGE UP (ref 0–0)
PHOSPHATE SERPL-MCNC: 3.5 MG/DL — SIGNIFICANT CHANGE UP (ref 2.5–4.5)
PLATELET # BLD AUTO: 139 K/UL — LOW (ref 150–400)
POTASSIUM SERPL-MCNC: 3.9 MMOL/L — SIGNIFICANT CHANGE UP (ref 3.5–5.3)
POTASSIUM SERPL-SCNC: 3.9 MMOL/L — SIGNIFICANT CHANGE UP (ref 3.5–5.3)
PROTHROM AB SERPL-ACNC: 15.3 SEC — HIGH (ref 10–12.9)
RBC # BLD: 4.12 M/UL — SIGNIFICANT CHANGE UP (ref 3.8–5.2)
RBC # FLD: 13.9 % — SIGNIFICANT CHANGE UP (ref 10.3–14.5)
SODIUM SERPL-SCNC: 143 MMOL/L — SIGNIFICANT CHANGE UP (ref 135–145)
WBC # BLD: 6.29 K/UL — SIGNIFICANT CHANGE UP (ref 3.8–10.5)
WBC # FLD AUTO: 6.29 K/UL — SIGNIFICANT CHANGE UP (ref 3.8–10.5)

## 2019-07-05 PROCEDURE — 93880 EXTRACRANIAL BILAT STUDY: CPT | Mod: 26

## 2019-07-05 PROCEDURE — 99222 1ST HOSP IP/OBS MODERATE 55: CPT

## 2019-07-05 PROCEDURE — 71045 X-RAY EXAM CHEST 1 VIEW: CPT | Mod: 26

## 2019-07-05 PROCEDURE — 93306 TTE W/DOPPLER COMPLETE: CPT | Mod: 26

## 2019-07-05 PROCEDURE — 93010 ELECTROCARDIOGRAM REPORT: CPT

## 2019-07-05 PROCEDURE — ZZZZZ: CPT

## 2019-07-05 RX ORDER — POTASSIUM CHLORIDE 20 MEQ
20 PACKET (EA) ORAL ONCE
Refills: 0 | Status: COMPLETED | OUTPATIENT
Start: 2019-07-05 | End: 2019-07-05

## 2019-07-05 RX ORDER — APIXABAN 2.5 MG/1
2.5 TABLET, FILM COATED ORAL EVERY 12 HOURS
Refills: 0 | Status: DISCONTINUED | OUTPATIENT
Start: 2019-07-05 | End: 2019-07-06

## 2019-07-05 RX ORDER — METOPROLOL TARTRATE 50 MG
12.5 TABLET ORAL EVERY 12 HOURS
Refills: 0 | Status: DISCONTINUED | OUTPATIENT
Start: 2019-07-05 | End: 2019-07-06

## 2019-07-05 RX ADMIN — SODIUM CHLORIDE 3 MILLILITER(S): 9 INJECTION INTRAMUSCULAR; INTRAVENOUS; SUBCUTANEOUS at 06:18

## 2019-07-05 RX ADMIN — Medication 100 MILLIGRAM(S): at 21:45

## 2019-07-05 RX ADMIN — Medication 100 MILLIGRAM(S): at 06:20

## 2019-07-05 RX ADMIN — SODIUM CHLORIDE 3 MILLILITER(S): 9 INJECTION INTRAMUSCULAR; INTRAVENOUS; SUBCUTANEOUS at 21:19

## 2019-07-05 RX ADMIN — PANTOPRAZOLE SODIUM 40 MILLIGRAM(S): 20 TABLET, DELAYED RELEASE ORAL at 06:20

## 2019-07-05 RX ADMIN — APIXABAN 2.5 MILLIGRAM(S): 2.5 TABLET, FILM COATED ORAL at 17:19

## 2019-07-05 RX ADMIN — Medication 20 MILLIEQUIVALENT(S): at 07:40

## 2019-07-05 RX ADMIN — SENNA PLUS 2 TABLET(S): 8.6 TABLET ORAL at 21:45

## 2019-07-05 RX ADMIN — Medication 12.5 MILLIGRAM(S): at 17:19

## 2019-07-05 RX ADMIN — Medication 81 MILLIGRAM(S): at 12:04

## 2019-07-05 RX ADMIN — LATANOPROST 1 DROP(S): 0.05 SOLUTION/ DROPS OPHTHALMIC; TOPICAL at 21:44

## 2019-07-05 RX ADMIN — SODIUM CHLORIDE 3 MILLILITER(S): 9 INJECTION INTRAMUSCULAR; INTRAVENOUS; SUBCUTANEOUS at 14:45

## 2019-07-05 NOTE — DISCHARGE NOTE PROVIDER - CARE PROVIDERS DIRECT ADDRESSES
,[email or web address],[email or web address]

## 2019-07-05 NOTE — PROGRESS NOTE ADULT - ASSESSMENT
89 year old female with history of HTN, GIB, Afib on Eliquis, chronic diastolic CHF with, EF 60% and known severe MR referred to Dr. Rojas for procedural intervention and deemed a candidate after completion of pre-operative assessment which was significant for MESFIN showing severely dilated left and right atria, mitral valve prolapse with flail posterior leaflet ( P2 and P3 scallops). Severe eccentric anteriorly directed mitral valve regurgitation. Aortic sclerosis with mild AI, severe TR and severe pulmonary hypertension, PASP 60mmhg.  On 7/2/19, she was admitted to [facility] under the care of Dr. Rojas for planned procedure which was delayed as patient had taken her Eliquis the day prior.  She was instead admitted to floor care and on 7/3/19 underwent uncomplicated TF transcatheter MV repair with Montano Saulo Clasp x 2.  She arrived to mini-ICU care post-procedure extubated in stable condition.  On 7/4/19, patient developed isolated brief episode of visual disturbance in right eye suggestive of amaurosis fugax.  Ophthalmology consulted with recommendation for outpatient follow-up.  Now POD 2, no acute issues overnight.     Neuro: No focal deficits. No delirium. See below section.     HEENT: History of corneal repair ~10 years ago.   - Vision disturbance on POD 1, Opthalmology consulted, NTD.  Outpatient follow-up recommended. Probable amaurosis fugax  - Dr. Rojas, Dr. Maldonado aware.   -   - Optho consulted, will see patient early in AM.   - Patient states no known history of any carotid disease, appears to have no carotid dopplers preop, will order for AM.     CVS:  - Hx of HTN, home losartan not started for soft BPs and f/u restarting metoprolol  - Afib, on eliquis at home. Will restart AC when r/o no pericardial effusion on echo today  - formal echo to be done on 7/5    Pulm:  - on RA, xray stable  - c/w IS and ambulation    GI:  - on PO diet, c/w GI ppx    :  - Cr stable, (17/0.87) cont to monitor post contrast  - lasix given preop for LE edema, f/u if needed post op and as outpatient, no urgent need right now      Heme:  - Start heparin gtt per Dr. Rojas. When echo performed, if no pericardial effusion, transition to eliquis.   - F/u PTT at 22:00.   - c/w SCD for DVT ppx    ID:  - periop abx, WBC 8.4  - no signs of SIRS, cont to monitor      Endo:  - no hx of DM  - A1c: 5.3    Dispo:  home when medically ready 89 year old female with history of HTN, GIB, Afib on Eliquis, chronic diastolic CHF with, EF 60% and known severe MR referred to Dr. Rojas for procedural intervention and deemed a candidate after completion of pre-operative assessment which was significant for MESFIN showing severely dilated left and right atria, mitral valve prolapse with flail posterior leaflet ( P2 and P3 scallops). Severe eccentric anteriorly directed mitral valve regurgitation. Aortic sclerosis with mild AI, severe TR and severe pulmonary hypertension, PASP 60mmhg.  On 7/2/19, she was admitted to [facility] under the care of Dr. Rojas for planned procedure which was delayed as patient had taken her Eliquis the day prior.  She was instead admitted to floor care and on 7/3/19 underwent uncomplicated TF transcatheter MV repair with Montano Saulo Clasp x 2.  She arrived to mini-ICU care post-procedure extubated in stable condition.  On 7/4/19, patient developed isolated brief episode of visual disturbance in right eye suggestive of amaurosis fugax.  Ophthalmology consulted with recommendation for outpatient follow-up.  Now POD 2, no acute issues overnight.     Neuro: No focal deficits. No delirium.   - Per Dr. Rojas, neurology consulted for recent vision disturbance, f/u recommendations.   - Continue to monitor neuro status.     HEENT: History of corneal repair ~10 years ago.   - Vision disturbance on POD 1, Opthalmology consulted, NTD.  Outpatient follow-up recommended. Probable amaurosis fugax  - C/w patient's home eye drops.      Cardiovascular: History above.  POD 2 s/p Transcatheter MV repair via Saulo Clasp, EF 55%  - HTN: Started low dose BB, titrate as BP allows.   - s/p MVrepair: C/w ASA, BB  - AFib: Restarted home Eliquis today. C/w BB for rate control  - ECHO today, results above.  Trivial pericardial effusion noted and iatrogenic ASD with Saulo clasps well positioned.  Images discussed with Dr. Rojas.     Pulmonary: No acute issues, tolerating room air  -CXR this AM stable.   -Monitor respiratory status via SpO2    GI: Tolerating PO  -PPX: Protonix  -C/w Bowel regimen.     :   -BUN/Cr: 21/1.04, repeat in AM.   -Diuresis PRN.   -Replete electrolytes PRN.     Heme:  -H/H stable.   -aPTT supratherapeutic after hep gtt started on 7/4, discontinued this AM.  Repeat aPTT 40s, restarted home eliquis per Dr. Rojas.   -DVT ppx: SCDs and Eliquis.    ID:  -WBC: 6  -Completed periop abx  -no signs of SIRS, cont to monitor      Endo: No acute issues  - no hx of DM  - A1c: 5.3    Dispo:  Home tomorrow

## 2019-07-05 NOTE — CONSULT NOTE ADULT - ASSESSMENT
1. Transient visual obscuration OD  -Possible amaurosis fugax  -Resolved at this time  -No other focal deficits or neurologic symptoms  -Recommend obtaining carotid doppler US given pts cardiac history and continue f/u with cardiology for appropriate management    2. Optic cupping   -Continue latanoprost QHS OU  -Continue outpatient f/u with private ophthalmologist as scheduled

## 2019-07-05 NOTE — DISCHARGE NOTE PROVIDER - INSTRUCTIONS
** Please restrict your fluid intake to less than 1.5L per day, or 6 cups per day.   - Please reduce your sodium intake. Limiting sodium in your diet helps minimize the amount of extra fluid around your heart, lungs, and in your legs.  ** Please weigh yourself daily and record it in a notebook, if you notice more than a 3 pound weight gain in 1 day or 5 pounds in one week,  please call your doctor right away.

## 2019-07-05 NOTE — CONSULT NOTE ADULT - SUBJECTIVE AND OBJECTIVE BOX
INCOMPLETE NOTE    NEUROLOGY INITIAL CONSULT NOTE    CHIEF COMPLAINT:  Temporary right eye blindness, right temporal hemianopia     HPI:  89 y/o female with hx of HTN, new afib (on eliquis), hx GI bleed, chronic diastolic heart failure with known severe MR who was referred for surgical evaluation. She was seen by Dr. Munoz for surgical intervention but was found to not be a candidate. She was evaluated by Dr. Rojas and found to be a candidate for transcatheter intervention. She reports FELIZ and worsening palpitations when walking about one block. She also states that she has had new LE edema over the past week. She denies any CP, SOB at rest, PND, orthopnea, dizziness, syncope. She underwent a preop workup which included a MESFIN showing severely dilated left and right atria, mitral valve prolapse with flail posterior leaflet ( P2 and P3 scallops). Severe eccentric anteriorly directed mitral valve regurgitation. Aortic sclerosis with mild AI, severe TR and severe pulmonary hypertension, PASP 60mmhg. She had a Structural CTA showing minimal stenosis of the proximal and mid LAD, and the rest of the coronaries were normal. She was planned to have an admission with same day surgery for transcatheter mitral valve repair but she took her Eliquis at 2PM on 7/1. Due to risk of bleeding case was canceled and patient will be admitted for IV diuresis and planned surgery on this admission. s/p percutaneous transcatheter repair of mitral valve using multiple leaflet clip on 7/3. On POD 1, patient developed right eye temporal hemianopia described as blindness of the right eye in the superior half of the superior nasal quadrant, complete blindness of the superior temporal quadrant, and superior half of the inferior temporal quadrant. No curtain closing visual loss, floaters, flashing lights. No symptoms of left eye. Never had these symptoms in the past. The blindness lasted for couple of seconds to ~ 1 minute and resolved spontaneously. Denied slurred speech, headache, weakness, numbness during that episode. No history of CVA in the past. Ophtho was consulted and recommended US carotid dopplers given concern for amaurosis fugax continuation of latanoprost QHS OU for optic cupping. Neurology consulted for temporary eye blindness    PAST MEDICAL & SURGICAL HISTORY:  HTN (hypertension)  Atrial fibrillation  Mitral regurgitation  Diastolic heart failure  H/O total hysterectomy  H/O exploratory laparotomy      REVIEW OF SYSTEMS:  As per HPI, otherwise negative for Constitutional, Eyes, Ears/Nose/Mouth/Throat, Neck, Cardiovascular, Respiratory, Gastrointestinal, Genitourinary, Skin, Endocrine, Musculoskeletal, Psychiatric, and Hematologic/Lymphatic.    MEDICATIONS  (STANDING):  apixaban 2.5 milliGRAM(s) Oral every 12 hours  aspirin enteric coated 81 milliGRAM(s) Oral daily  docusate sodium 100 milliGRAM(s) Oral three times a day  latanoprost 0.005% Ophthalmic Solution 1 Drop(s) Both EYES at bedtime  metoprolol tartrate 12.5 milliGRAM(s) Oral every 12 hours  pantoprazole    Tablet 40 milliGRAM(s) Oral before breakfast  senna 2 Tablet(s) Oral at bedtime  sodium chloride 0.9% lock flush 3 milliLiter(s) IV Push every 8 hours    MEDICATIONS  (PRN):  polyethylene glycol 3350 17 Gram(s) Oral daily PRN Constipation      Allergies    penicillin (Hives; Blisters)    Intolerances        FAMILY HISTORY:      SOCIAL HISTORY:  Living Situation:  Occupation:  Tobacco:  Alcohol:   Drug use:      VITAL SIGNS:  Vital Signs Last 24 Hrs  T(C): 36.9 (05 Jul 2019 13:43), Max: 37.8 (05 Jul 2019 09:35)  T(F): 98.5 (05 Jul 2019 13:43), Max: 100 (05 Jul 2019 09:35)  HR: 71 (05 Jul 2019 12:05) (60 - 72)  BP: 138/83 (05 Jul 2019 12:05) (102/50 - 138/83)  BP(mean): 110 (05 Jul 2019 12:05) (69 - 110)  RR: 16 (05 Jul 2019 12:05) (16 - 18)  SpO2: 98% (05 Jul 2019 12:05) (95% - 99%)    PHYSICAL EXAMINATION:  Constitutional: WDWN; NAD  Eyes: Conjunctiva and sclera clear.  Cardiovascular: Regular rate and rhythm; S1 and S2 Normal; No murmurs, gallops or rubs.  Neurologic:  - Mental Status:  AAOx3; speech is fluent with intact naming, repetition, and comprehension  - Cranial Nerves II-XII:    II:  PERRLA; visual fields are full to confrontation  III, IV, VI:  EOMI, no nystagmus  V:  facial sensation is intact in the V1-V3 distribution bilaterally.  VII:  face is symmetric with normal eye closure and smile  VIII:  hearing is intact to finger rub  IX, X:  uvula is midline and soft palate rises symmetrically  XI:  head turning and shoulder shrug are intact bilaterally  XII:  tongue protrudes in the midline  - Motor:  strength is 5/5 throughout except LLE quads 4-/5 and RLE quads 4+/5; normal muscle bulk and tone throughout  - Reflexes:  2+ and symmetric at the biceps, triceps, brachioradialis, ankles; Hyporeflexic at the knees b/l. plantar reflexes downgoing bilaterally  - Sensory:  intact to light touch throughout  - Coordination:  finger-nose-finger and heel-knee-shin intact without dysmetria; rapid alternating hand movements intact  - Gait:  deferred        LABS:                        12.6   6.29  )-----------( 139      ( 05 Jul 2019 05:59 )             37.9     07-05    143  |  107  |  21  ----------------------------<  106<H>  3.9   |  26  |  1.04    Ca    9.2      05 Jul 2019 05:59  Phos  3.5     07-05  Mg     2.0     07-05    TPro  6.2  /  Alb  3.8  /  TBili  0.8  /  DBili  x   /  AST  26  /  ALT  14  /  AlkPhos  73  07-04    PT/INR - ( 05 Jul 2019 05:59 )   PT: 15.3 sec;   INR: 1.34          PTT - ( 05 Jul 2019 12:21 )  PTT:45.1 sec      RADIOLOGY & ADDITIONAL STUDIES: NEUROLOGY INITIAL CONSULT NOTE    CHIEF COMPLAINT:  Temporary right eye blindness, right temporal hemianopia     HPI:  89 y/o female with hx of HTN, new afib (on eliquis), hx GI bleed, chronic diastolic heart failure with known severe MR who was referred for surgical evaluation. She was seen by Dr. Munoz for surgical intervention but was found to not be a candidate. She was evaluated by Dr. Rojas and found to be a candidate for transcatheter intervention. She reports FELIZ and worsening palpitations when walking about one block. She also states that she has had new LE edema over the past week. She denies any CP, SOB at rest, PND, orthopnea, dizziness, syncope. She underwent a preop workup which included a MESFIN showing severely dilated left and right atria, mitral valve prolapse with flail posterior leaflet ( P2 and P3 scallops). Severe eccentric anteriorly directed mitral valve regurgitation. Aortic sclerosis with mild AI, severe TR and severe pulmonary hypertension, PASP 60mmhg. She had a Structural CTA showing minimal stenosis of the proximal and mid LAD, and the rest of the coronaries were normal. She was planned to have an admission with same day surgery for transcatheter mitral valve repair but she took her Eliquis at 2PM on 7/1. Due to risk of bleeding case was canceled and patient will be admitted for IV diuresis and planned surgery on this admission. s/p percutaneous transcatheter repair of mitral valve using multiple leaflet clip on 7/3. On POD 1, patient developed right eye temporal hemianopia described as blindness of the right eye in the superior half of the superior nasal quadrant, complete blindness of the superior temporal quadrant, and superior half of the inferior temporal quadrant. No curtain closing visual loss, floaters, flashing lights. No symptoms of left eye. Never had these symptoms in the past. The blindness lasted for couple of seconds to ~ 1 minute and resolved spontaneously. Denied slurred speech, headache, weakness, numbness during that episode. No history of CVA in the past. Ophtho was consulted and recommended US carotid dopplers given concern for amaurosis fugax continuation of latanoprost QHS OU for optic cupping. Neurology consulted for temporary eye blindness    PAST MEDICAL & SURGICAL HISTORY:  HTN (hypertension)  Atrial fibrillation  Mitral regurgitation  Diastolic heart failure  H/O total hysterectomy  H/O exploratory laparotomy      REVIEW OF SYSTEMS:  As per HPI, otherwise negative for Constitutional, Eyes, Ears/Nose/Mouth/Throat, Neck, Cardiovascular, Respiratory, Gastrointestinal, Genitourinary, Skin, Endocrine, Musculoskeletal, Psychiatric, and Hematologic/Lymphatic.    MEDICATIONS  (STANDING):  apixaban 2.5 milliGRAM(s) Oral every 12 hours  aspirin enteric coated 81 milliGRAM(s) Oral daily  docusate sodium 100 milliGRAM(s) Oral three times a day  latanoprost 0.005% Ophthalmic Solution 1 Drop(s) Both EYES at bedtime  metoprolol tartrate 12.5 milliGRAM(s) Oral every 12 hours  pantoprazole    Tablet 40 milliGRAM(s) Oral before breakfast  senna 2 Tablet(s) Oral at bedtime  sodium chloride 0.9% lock flush 3 milliLiter(s) IV Push every 8 hours    MEDICATIONS  (PRN):  polyethylene glycol 3350 17 Gram(s) Oral daily PRN Constipation      Allergies    penicillin (Hives; Blisters)    Intolerances        FAMILY HISTORY:      SOCIAL HISTORY:  Living Situation:  Occupation:  Tobacco:  Alcohol:   Drug use:      VITAL SIGNS:  Vital Signs Last 24 Hrs  T(C): 36.9 (05 Jul 2019 13:43), Max: 37.8 (05 Jul 2019 09:35)  T(F): 98.5 (05 Jul 2019 13:43), Max: 100 (05 Jul 2019 09:35)  HR: 71 (05 Jul 2019 12:05) (60 - 72)  BP: 138/83 (05 Jul 2019 12:05) (102/50 - 138/83)  BP(mean): 110 (05 Jul 2019 12:05) (69 - 110)  RR: 16 (05 Jul 2019 12:05) (16 - 18)  SpO2: 98% (05 Jul 2019 12:05) (95% - 99%)    PHYSICAL EXAMINATION:  Constitutional: WDWN; NAD  Eyes: Conjunctiva and sclera clear  Neurologic:  - Mental Status:  AAOx3; speech is fluent  - Cranial Nerves II-XII:    II:  PERRLA; visual fields are full to confrontation  III, IV, VI:  EOMI, no nystagmus  V:  facial sensation is intact in the V1-V3 distribution bilaterally.  VII:  face is symmetric with normal eye closure and smile  VIII:  hearing is intact to finger rub  IX, X:  uvula is midline and soft palate rises symmetrically  XI:  head turning and shoulder shrug are intact bilaterally  XII:  tongue protrudes in the midline  - Motor:  strength is 5/5 throughout except LLE quads 4-/5 and RLE quads 4+/5; normal muscle bulk and tone throughout  - Reflexes:  2+ and symmetric at the biceps, triceps, brachioradialis, ankles; Hyporeflexic at the knees b/l. plantar reflexes downgoing bilaterally  - Sensory:  intact to light touch throughout  - Coordination:  finger-nose-finger and heel-knee-shin intact without dysmetria; rapid alternating hand movements intact  - Gait:  deferred        LABS:                        12.6   6.29  )-----------( 139      ( 05 Jul 2019 05:59 )             37.9     07-05    143  |  107  |  21  ----------------------------<  106<H>  3.9   |  26  |  1.04    Ca    9.2      05 Jul 2019 05:59  Phos  3.5     07-05  Mg     2.0     07-05    TPro  6.2  /  Alb  3.8  /  TBili  0.8  /  DBili  x   /  AST  26  /  ALT  14  /  AlkPhos  73  07-04    PT/INR - ( 05 Jul 2019 05:59 )   PT: 15.3 sec;   INR: 1.34          PTT - ( 05 Jul 2019 12:21 )  PTT:45.1 sec      RADIOLOGY & ADDITIONAL STUDIES: NEUROLOGY INITIAL CONSULT NOTE    CHIEF COMPLAINT:  Temporary right eye blindness, right temporal hemianopia     HPI:  89 y/o female with hx of HTN, new afib (on eliquis), hx GI bleed, chronic diastolic heart failure with known severe MR who was referred for surgical evaluation. She was seen by Dr. Munoz for surgical intervention but was found to not be a candidate. She was evaluated by Dr. Rojas and found to be a candidate for transcatheter intervention. She reports FELIZ and worsening palpitations when walking about one block. She also states that she has had new LE edema over the past week. She denies any CP, SOB at rest, PND, orthopnea, dizziness, syncope. She underwent a preop workup which included a MESFIN showing severely dilated left and right atria, mitral valve prolapse with flail posterior leaflet ( P2 and P3 scallops). Severe eccentric anteriorly directed mitral valve regurgitation. Aortic sclerosis with mild AI, severe TR and severe pulmonary hypertension, PASP 60mmhg. She had a Structural CTA showing minimal stenosis of the proximal and mid LAD, and the rest of the coronaries were normal. She was planned to have an admission with same day surgery for transcatheter mitral valve repair but she took her Eliquis at 2PM on 7/1. Due to risk of bleeding case was canceled and patient will be admitted for IV diuresis and planned surgery on this admission. s/p percutaneous transcatheter repair of mitral valve using multiple leaflet clip on 7/3. On POD 1, patient developed right eye temporal hemianopia described as blindness of the right eye in the superior half of the superior nasal quadrant, complete blindness of the superior temporal quadrant, and superior half of the inferior temporal quadrant. No curtain closing visual loss, floaters, flashing lights. No symptoms of left eye. Never had these symptoms in the past. The blindness lasted for couple of seconds to ~ 1 minute and resolved spontaneously. Denied slurred speech, headache, weakness, numbness during that episode. No history of CVA in the past. Ophtho was consulted and recommended US carotid dopplers given concern for amaurosis fugax continuation of latanoprost QHS OU for optic cupping. Neurology consulted for temporary visual loss    PAST MEDICAL & SURGICAL HISTORY:  HTN (hypertension)  Atrial fibrillation  Mitral regurgitation  Diastolic heart failure  H/O total hysterectomy  H/O exploratory laparotomy      REVIEW OF SYSTEMS:  As per HPI, otherwise negative for Constitutional, Eyes, Ears/Nose/Mouth/Throat, Neck, Cardiovascular, Respiratory, Gastrointestinal, Genitourinary, Skin, Endocrine, Musculoskeletal, Psychiatric, and Hematologic/Lymphatic.    MEDICATIONS  (STANDING):  apixaban 2.5 milliGRAM(s) Oral every 12 hours  aspirin enteric coated 81 milliGRAM(s) Oral daily  docusate sodium 100 milliGRAM(s) Oral three times a day  latanoprost 0.005% Ophthalmic Solution 1 Drop(s) Both EYES at bedtime  metoprolol tartrate 12.5 milliGRAM(s) Oral every 12 hours  pantoprazole    Tablet 40 milliGRAM(s) Oral before breakfast  senna 2 Tablet(s) Oral at bedtime  sodium chloride 0.9% lock flush 3 milliLiter(s) IV Push every 8 hours    MEDICATIONS  (PRN):  polyethylene glycol 3350 17 Gram(s) Oral daily PRN Constipation      Allergies    penicillin (Hives; Blisters)    Intolerances        FAMILY HISTORY:      SOCIAL HISTORY:  Living Situation:  Occupation:  Tobacco:  Alcohol:   Drug use:      VITAL SIGNS:  Vital Signs Last 24 Hrs  T(C): 36.9 (05 Jul 2019 13:43), Max: 37.8 (05 Jul 2019 09:35)  T(F): 98.5 (05 Jul 2019 13:43), Max: 100 (05 Jul 2019 09:35)  HR: 71 (05 Jul 2019 12:05) (60 - 72)  BP: 138/83 (05 Jul 2019 12:05) (102/50 - 138/83)  BP(mean): 110 (05 Jul 2019 12:05) (69 - 110)  RR: 16 (05 Jul 2019 12:05) (16 - 18)  SpO2: 98% (05 Jul 2019 12:05) (95% - 99%)    PHYSICAL EXAMINATION:  Constitutional: WDWN; NAD  Eyes: Conjunctiva and sclera clear  Neurologic:  - Mental Status:  AAOx3; speech is fluent  - Cranial Nerves II-XII:    II:  PERRLA; visual fields are full to confrontation  III, IV, VI:  EOMI, no nystagmus  V:  facial sensation is intact in the V1-V3 distribution bilaterally.  VII:  face is symmetric with normal eye closure and smile  VIII:  hearing is intact to finger rub  IX, X:  uvula is midline and soft palate rises symmetrically  XI:  head turning and shoulder shrug are intact bilaterally  XII:  tongue protrudes in the midline  - Motor:  strength is 5/5 throughout except LLE quads 4-/5 and RLE quads 4+/5; normal muscle bulk and tone throughout  - Reflexes:  2+ and symmetric at the biceps, triceps, brachioradialis, ankles; Hyporeflexic at the knees b/l. plantar reflexes downgoing bilaterally  - Sensory:  intact to light touch throughout  - Coordination:  finger-nose-finger and heel-knee-shin intact without dysmetria; rapid alternating hand movements intact  - Gait:  deferred        LABS:                        12.6   6.29  )-----------( 139      ( 05 Jul 2019 05:59 )             37.9     07-05    143  |  107  |  21  ----------------------------<  106<H>  3.9   |  26  |  1.04    Ca    9.2      05 Jul 2019 05:59  Phos  3.5     07-05  Mg     2.0     07-05    TPro  6.2  /  Alb  3.8  /  TBili  0.8  /  DBili  x   /  AST  26  /  ALT  14  /  AlkPhos  73  07-04    PT/INR - ( 05 Jul 2019 05:59 )   PT: 15.3 sec;   INR: 1.34          PTT - ( 05 Jul 2019 12:21 )  PTT:45.1 sec      RADIOLOGY & ADDITIONAL STUDIES:

## 2019-07-05 NOTE — DISCHARGE NOTE PROVIDER - NSDCFUADDAPPT_GEN_ALL_CORE_FT
Dr. Rojas:    Dr. Bonilla:    Ophthalmology: Please keep your scheduled appointment - Please follow up with Dr. Rojas on Monday 7/15/19 at 11:15AM. The office is located at [facility], [facility], 4th floor. Call us with any questions #[phone].  - Please also follow up with Dr. Bonilla (referring MD) within 2-4 weeks. Please call their office on Monday to schedule an appointment.   - Ophthalmology: Please keep your scheduled appointment with your ophthalmologist.

## 2019-07-05 NOTE — DISCHARGE NOTE PROVIDER - NSDCHHNEEDSERVICE_GEN_ALL_CORE
Wound care and assessment Teaching and training/Observation and assessment/Wound care and assessment

## 2019-07-05 NOTE — DISCHARGE NOTE PROVIDER - NSDCFUADDINST_GEN_ALL_CORE_FT
-Walk daily as tolerated and use your incentive spirometer every hour.    -No driving or strenuous activity/exercise for 6 weeks, or until  cleared by your surgeon.    -Gently clean your incisions with anti-bacterial soap and water, pat  dry.  You may leave them open to air.    -Call your doctor if you have shortness of breath, chest pain not  relieved by pain medication, dizziness, fever >101.5, or increased  redness or drainage from incisions. -Walk daily as tolerated and use your incentive spirometer every hour.    -No driving or strenuous activity/exercise until  cleared by your surgeon.    -Gently clean your incisions with anti-bacterial soap and water, pat  dry.  You may leave them open to air.    -Call your doctor if you have shortness of breath, chest pain not  relieved by pain medication, dizziness, fever >101.5, or increased  redness or drainage from incisions.

## 2019-07-05 NOTE — CONSULT NOTE ADULT - ASSESSMENT
89 y/o female with hx of HTN, new afib (on eliquis), hx GI bleed, chronic diastolic heart failure with known severe MR admitted for mitral valve repair, which was done on 7/3/19 c/b temporary right eye temporal hemianopia with no additional neurologic deficits. Neuro consulted for evaluation    #Right eye temporal hemianopia: Patient described sudden blindness right eye in the superior half of the superior nasal quadrant, complete blindness of the superior temporal quadrant, and superior half of the inferior temporal quadrant. No curtain closing visual loss, floaters, flashing lights. No symptoms of left eye. Never had these symptoms in the past. The blindness lasted for couple of seconds to ~ 1 minute and resolved spontaneously. Denied slurred speech, headache, weakness, numbness during that episode. No history of CVA in the past. Possibly amaurosis fugax/TIA given spontaneous resolution of symptoms and no remaining deficits  -no focal deficits appreciated on exam  -f/u US b/l carotid artery dopplers    Will discuss with Dr. Weaver 89 y/o female with hx of HTN, new afib (on eliquis), hx GI bleed, chronic diastolic heart failure with known severe MR admitted for mitral valve repair, which was done on 7/3/19 c/b temporary right eye temporal hemianopia with no additional neurologic deficits. Neuro consulted for evaluation    #Right eye temporal hemianopia: Patient described sudden blindness right eye in the superior half of the superior nasal quadrant, complete blindness of the superior temporal quadrant, and superior half of the inferior temporal quadrant. No curtain closing visual loss, floaters, flashing lights. No symptoms of left eye. Never had these symptoms in the past. The blindness lasted for couple of seconds to ~ 1 minute and resolved spontaneously. Denied slurred speech, headache, weakness, numbness during that episode. No history of CVA in the past. Possibly amaurosis fugax/TIA given spontaneous resolution of symptoms and no remaining deficits  -no focal deficits appreciated on exam  -f/u US b/l carotid artery dopplers. If negative then recommending MRI brain   -look for arrhythmia that could have caused cardioembolic event     Discussed with Dr. Weaver 89 y/o female with hx of HTN, new afib (on eliquis), hx GI bleed, chronic diastolic heart failure with known severe MR admitted for mitral valve repair, which was done on 7/3/19 c/b temporary right eye temporal hemianopia with no additional neurologic deficits. Neuro consulted for evaluation    #Right eye temporal hemianopia: Patient described sudden blindness right eye in the superior half of the superior nasal quadrant, complete blindness of the superior temporal quadrant, and superior half of the inferior temporal quadrant. No curtain closing visual loss, floaters, flashing lights. No symptoms of left eye. Never had these symptoms in the past. The blindness lasted for couple of seconds to ~ 1 minute and resolved spontaneously. Denied slurred speech, headache, weakness, numbness during that episode. No history of CVA in the past. Possibly amaurosis fugax/TIA given spontaneous resolution of symptoms and no remaining deficits  -no focal deficits appreciated on exam  -check telemetry for arrhythmia at the time of symptoms that could have caused cardioembolic event   -f/u US b/l carotid artery dopplers. If negative then recommending MRI brain to rule out small cortical ischemia causing symptoms    Discussed with Dr. Weaver

## 2019-07-05 NOTE — PROGRESS NOTE ADULT - SUBJECTIVE AND OBJECTIVE BOX
Patient discussed on morning rounds with Dr. Rojas      Operation / Date: 7/3/19: MV Repair, transcatheter with russell cookie clasp x 2    SUBJECTIVE ASSESSMENT:  89y Female seen at bedside this AM.  She feels very well and states pain is well controlled at this time.  No acute issues overnight.  Denies HA, AMS, CP, palpitations, SOB, cough, hemoptysis, n/v/d, fever.     Vital Signs Last 24 Hrs  T(C): 36.9 (05 Jul 2019 13:43), Max: 37.8 (05 Jul 2019 09:35)  T(F): 98.5 (05 Jul 2019 13:43), Max: 100 (05 Jul 2019 09:35)  HR: 71 (05 Jul 2019 12:05) (60 - 72)  BP: 138/83 (05 Jul 2019 12:05) (102/50 - 138/83)  BP(mean): 110 (05 Jul 2019 12:05) (69 - 110)  RR: 16 (05 Jul 2019 12:05) (16 - 18)  SpO2: 98% (05 Jul 2019 12:05) (95% - 99%)  I&O's Detail    04 Jul 2019 07:01  -  05 Jul 2019 07:00  --------------------------------------------------------  IN:    heparin Infusion: 65 mL    Oral Fluid: 600 mL  Total IN: 665 mL    OUT:    Voided: 1000 mL  Total OUT: 1000 mL    Total NET: -335 mL      05 Jul 2019 07:01  -  05 Jul 2019 14:32  --------------------------------------------------------  IN:    Oral Fluid: 355 mL  Total IN: 355 mL    OUT:  Total OUT: 0 mL    Total NET: 355 mL    CHEST TUBE:  No.   PATRICK DRAIN:  No.  EPICARDIAL WIRES: No.  TIE DOWNS: No.  SMITH: No.     PHYSICAL EXAM:  General: OOB to chair, no acute distress.  Neurological: AAOx3, no AMS or focal deficits.   Cardiovascular: Irregular, rate controlled, S1/S2, no m/r/g.   Respiratory: No acute distress on RA.  CTA b/l, no w/r/r.   Gastrointestinal: ND, NBS, non-TTP.   Extremities: Warm and well perfused, no calf ttp or edema b/l.   Vascular: Pulses 2+ throughout  Incision Sites: R groin: C/D/I, no hematoma.     LABS:                        12.6   6.29  )-----------( 139      ( 05 Jul 2019 05:59 )             37.9       COUMADIN: No- Eliquis resumed for Afib.     PT/INR - ( 05 Jul 2019 05:59 )   PT: 15.3 sec;   INR: 1.34          PTT - ( 05 Jul 2019 12:21 )  PTT:45.1 sec    07-05    143  |  107  |  21  ----------------------------<  106<H>  3.9   |  26  |  1.04    Ca    9.2      05 Jul 2019 05:59  Phos  3.5     07-05  Mg     2.0     07-05    TPro  6.2  /  Alb  3.8  /  TBili  0.8  /  DBili  x   /  AST  26  /  ALT  14  /  AlkPhos  73  07-04      MEDICATIONS  (STANDING):  apixaban 2.5 milliGRAM(s) Oral every 12 hours  aspirin enteric coated 81 milliGRAM(s) Oral daily  docusate sodium 100 milliGRAM(s) Oral three times a day  latanoprost 0.005% Ophthalmic Solution 1 Drop(s) Both EYES at bedtime  pantoprazole    Tablet 40 milliGRAM(s) Oral before breakfast  senna 2 Tablet(s) Oral at bedtime  sodium chloride 0.9% lock flush 3 milliLiter(s) IV Push every 8 hours  sodium chloride 0.9%. 1000 milliLiter(s) (30 mL/Hr) IV Continuous <Continuous>  sodium chloride 0.9%. 1000 milliLiter(s) (10 mL/Hr) IV Continuous <Continuous>    MEDICATIONS  (PRN):  polyethylene glycol 3350 17 Gram(s) Oral daily PRN Constipation      RADIOLOGY & ADDITIONAL TESTS:  < from: Echocardiogram (07.05.19 @ 12:06) >  Interpretation Summary  Normal left ventricular size and wall thickness. Abnormal (paradoxical)   septal   motion consistent with RV volume overload. The left ventricular ejection   fraction is normal. The left ventricular ejection fraction is 55%.  The   right   ventricle is normal in size and function.The left atrium is severely   dilated.   The right atrium is severely dilated. Iatrogenic atrial septal defect is   seen   with predominantly left to right flow.  Minimally calcified trileaflet   aortic   valve. There is mild to moderate aortic regurgitation.  Cookie device x  2 is   seen in the mitral position. There is moderate, eccentric, anteriorly   directed   mitral regurgitation. The mean transmitral gradient is 4 mmHg at a heart   rate   of 69 bpm.  Structurally normal tricuspid valve. There is severe   tricuspid   regurgitation. There is severe pulmonary hypertension. The pulmonary   artery   systolic pressure is estimated to be 60 mmHg.  Structurally normal   pulmonic   valve. There is trace pulmonic regurgitation.A trivial pericardial   effusion   noted.The IVC is dilated (>2.1 cm) with an abnormal inspiratory collapse   (<50%) consistent with elevated right atrial pressure.    < end of copied text >

## 2019-07-05 NOTE — DISCHARGE NOTE PROVIDER - CARE PROVIDER_API CALL
Thiago Rojas)  Cardiology; Interventional Cardiology  [address] [place] [address]  Phone: [phone]  Fax: [phone]  Follow Up Time:     Armani Bonilla)  Internal Medicine  [address] [address]  Phone: [phone]  Fax: [phone]  Follow Up Time: Thiago Rojas)  Cardiology; Interventional Cardiology  [address], [address] [place] [address]  Phone: [phone]  Fax: [phone]  Follow Up Time:     Armani Bonilla)  Internal Medicine  [address] [place] [address]  Phone: [phone]  Fax: [phone]  Follow Up Time:     Robinson Weaver)  Neurology; Neuromuscular Medicine  [address], [address] [place] [address]  Phone: [phone]  Fax: [phone]  Follow Up Time:

## 2019-07-05 NOTE — PROGRESS NOTE ADULT - ASSESSMENT
S?P MV Repair   Severe TR Pulm HTN Moderate MR   Normal EF    s/p Amaurosis  Fugax     cont post procedure care

## 2019-07-05 NOTE — DISCHARGE NOTE PROVIDER - HOSPITAL COURSE
This is a 89 year old female with history of HTN, GIB, Afib on Eliquis, chronic diastolic CHF with, EF 60% and known severe MR referred to Dr. Rojas for procedural intervention and deemed a candidate after completion of pre-operative assessment which was significant for MESFIN showing severely dilated left and right atria, mitral valve prolapse with flail posterior leaflet ( P2 and P3 scallops). Severe eccentric anteriorly directed mitral valve regurgitation. Aortic sclerosis with mild AI, severe TR and severe pulmonary hypertension, PASP 60mmhg.  On 7/2/19, she was admitted to [facility] under the care of Dr. Rojas for planned procedure which was delayed as patient had taken her Eliquis the day prior.  She was instead admitted to floor care and on 7/3/19 underwent uncomplicated TF transcatheter MV repair with Montano Saulo Clasp x 2.  She arrived to mini-ICU care post-procedure extubated in stable condition.  On 7/4/19, patient developed isolated brief episode of visual disturbance in right eye suggestive of unilateral hemianopsia vs. amaurosis fugax.  Ophthalmology consulted.  On 7/5/19, ECHO in AM showed [...].  Ophthalmology evaluated patient with recommendation of [...].  Patient evaluated by Dr. Munoz and medically cleared for discharge to home with plan for outpatient follow-up.  Over 35 minutes was spent with the patient reviewing the discharge material including medications, follow up appointments, recovery, concerning symptoms, and how to contact their health care providers if they have questions. This is a 89 year old female with history of HTN, GIB, Afib on Eliquis, chronic diastolic CHF with, EF 60% and known severe MR referred to Dr. Rojas for procedural intervention and deemed a candidate after completion of pre-operative assessment which was significant for MESFIN showing severely dilated left and right atria, mitral valve prolapse with flail posterior leaflet ( P2 and P3 scallops). Severe eccentric anteriorly directed mitral valve regurgitation. Aortic sclerosis with mild AI, severe TR and severe pulmonary hypertension, PASP 60mmhg.  On 7/2/19, she was admitted to [facility] under the care of Dr. Rojas for planned procedure which was delayed as patient had taken her Eliquis the day prior.  She was instead admitted to floor care and on 7/3/19 underwent uncomplicated TF transcatheter MV repair with Montano Saulo Clasp x 2.  She arrived to mini-ICU care post-procedure extubated in stable condition.  On 7/4/19, patient developed isolated brief episode of visual disturbance in right eye suggestive of unilateral hemianopsia vs. amaurosis fugax.  Ophthalmology consulted and evaluated patient with recommendation of b/l carotid dopplers, which was performed on 7/5/19 revealing no hemodynamically significant stenosis. Neurology also consulted recommending MRI brain, however patient refused, therefore underwent CT Head noncontrast revealing [...]. On 7/5/19, ECHO in AM showed EF 55%, severely dilated LA and RA, iatrogenic ASD (by design) with predominantly left to right flow, mild to mod AR, Saulo device x2 seen in mitral position, moderate eccentric anteriorly directed MR, mean transmitral gradient is 4mmHg at HR of 69bpm, trivial pericardial effusion.  Patient evaluated by Dr. Rojas and medically cleared for discharge to home with plan for outpatient follow-up.  Of note, patients home losartan was held as her SBPs have remained in 100-110s, and should be revisited as outpatient.     Over 35 minutes was spent with the patient reviewing the discharge material including medications, follow up appointments, recovery, concerning symptoms, and how to contact their health care providers if they have questions. This is a 89 year old female with history of HTN, GIB, Afib on Eliquis, chronic diastolic CHF with, EF 60% and known severe MR referred to Dr. Rojas for procedural intervention and deemed a candidate after completion of pre-operative assessment which was significant for MESFIN showing severely dilated left and right atria, mitral valve prolapse with flail posterior leaflet ( P2 and P3 scallops). Severe eccentric anteriorly directed mitral valve regurgitation. Aortic sclerosis with mild AI, severe TR and severe pulmonary hypertension, PASP 60mmhg.  On 7/2/19, she was admitted to [facility] under the care of Dr. Rojas for planned procedure which was delayed as patient had taken her Eliquis the day prior.  She was instead admitted to floor care and on 7/3/19 underwent uncomplicated TF transcatheter MV repair with Montano Saulo Clasp x 2.  She arrived to mini-ICU care post-procedure extubated in stable condition.  On 7/4/19, patient developed isolated brief episode of visual disturbance in right eye suggestive of unilateral hemianopsia vs. amaurosis fugax.  Ophthalmology consulted and evaluated patient with recommendation of b/l carotid dopplers, which was performed on 7/5/19 revealing no hemodynamically significant stenosis. Neurology also consulted recommending MRI brain, however patient refused, therefore underwent CT Head noncon revealing no acute findings, orbital contents unremarkable. On 7/5/19, ECHO in AM showed EF 55%, severely dilated LA and RA, iatrogenic ASD (by design) with predominantly left to right flow, mild to mod AR, Saulo device x2 seen in mitral position, moderate eccentric anteriorly directed MR, mean transmitral gradient is 4mmHg at HR of 69bpm, trivial pericardial effusion.  Patient evaluated by Dr. Munoz and medically cleared for discharge to home with plan for outpatient follow-up.  Of note, patients home losartan was held as her SBPs have remained in 100-110s, and should be revisited as outpatient. Per Dr. Rojas, patient will not be sent home on Lasix, but has been provided instruction for low sodium diet, fluid restriction and instructions to call if patient experiences 3lb weight gain in one day or 5lb weight gain in one week.     Over 35 minutes was spent with the patient reviewing the discharge material including medications, follow up appointments, recovery, concerning symptoms, and how to contact their health care providers if they have questions.

## 2019-07-05 NOTE — DISCHARGE NOTE PROVIDER - PROVIDER TOKENS
PROVIDER:[TOKEN:[[id number]]],PROVIDER:[TOKEN:[[id number]]] PROVIDER:[TOKEN:[[id number]]],PROVIDER:[TOKEN:[[id number]]],PROVIDER:[TOKEN:[[id number]]]

## 2019-07-05 NOTE — CONSULT NOTE ADULT - SUBJECTIVE AND OBJECTIVE BOX
89 yo F with PMH HTN, Mitral regurg, A fib, s/p MV repair 89 yo F with PMH HTN, Mitral regurg, A fib, s/p MV repair c/o curtain over part of her vision OD on POD1. Pt states that a black shadow appeared over half of her vision after surgery. She closed her eyes for 1-2 minutes and  the episode resolved. Vision returned to baseline. Since then, she has had no recurrences, and no similar episodes in the past. Pt follows with private ophthalmologist q6mo as an outpatient and is on latanoprost gtts QHS OU. She denies pain, flashes, floaters, photophobia, blurry vision at this time. Carotid doppler was ordered and is pending.     Past ocular history:  s/p LASIK OU  s/p CEIOL OU  ?glaucoma suspect - on latanoprost QHS OU    PAST MEDICAL & SURGICAL HISTORY:  HTN (hypertension)  Atrial fibrillation  Mitral regurgitation  Diastolic heart failure  H/O total hysterectomy  H/O exploratory laparotomy    nVAcc +2.75 readers J1 OU  EOM Full OU  P R/R no RAPD OU  CF Full OU    Exam   LLA WNL OU  C/S W/Q OU  K clear OU  AC formed OU  Iris flat, reactive OU    trop/phenyl  DFE  Lens PCIOL OU  Vit   D  M  V  P 89y Female with PMH HTN, Mitral regurg, A fib, s/p MV repair c/o curtain over part of her vision OD on POD1. Pt states that a black shadow appeared diagonally over half of her vision after surgery. She closed her eyes for 1-2 minutes and  the episode resolved. Vision returned to baseline. Since then, she has had no recurrences, and no similar episodes in the past. Pt follows with private ophthalmologist q6mo as an outpatient and is on latanoprost gtts QHS OU. She denies pain, flashes, floaters, photophobia, blurry vision at this time. Carotid doppler was ordered and is pending.     PAST MEDICAL & SURGICAL HISTORY:  HTN (hypertension)  Atrial fibrillation  Mitral regurgitation  Diastolic heart failure  H/O total hysterectomy  H/O exploratory laparotomy    MEDICATIONS  (STANDING):  apixaban 2.5 milliGRAM(s) Oral every 12 hours  aspirin enteric coated 81 milliGRAM(s) Oral daily  docusate sodium 100 milliGRAM(s) Oral three times a day  latanoprost 0.005% Ophthalmic Solution 1 Drop(s) Both EYES at bedtime  pantoprazole    Tablet 40 milliGRAM(s) Oral before breakfast  senna 2 Tablet(s) Oral at bedtime  sodium chloride 0.9% lock flush 3 milliLiter(s) IV Push every 8 hours  sodium chloride 0.9%. 1000 milliLiter(s) (30 mL/Hr) IV Continuous <Continuous>  sodium chloride 0.9%. 1000 milliLiter(s) (10 mL/Hr) IV Continuous <Continuous>    MEDICATIONS  (PRN):  polyethylene glycol 3350 17 Gram(s) Oral daily PRN Constipation    Vital Signs Last 24 Hrs  T(C): 37.8 (05 Jul 2019 09:35), Max: 37.8 (05 Jul 2019 09:35)  T(F): 100 (05 Jul 2019 09:35), Max: 100 (05 Jul 2019 09:35)  HR: 71 (05 Jul 2019 12:05) (60 - 72)  BP: 138/83 (05 Jul 2019 12:05) (98/47 - 138/83)  BP(mean): 110 (05 Jul 2019 12:05) (68 - 110)  RR: 16 (05 Jul 2019 12:05) (16 - 18)  SpO2: 98% (05 Jul 2019 12:05) (95% - 100%)    POH: Past ocular history:  s/p LASIK OU  s/p CEIOL OU  ?glaucoma suspect - on latanoprost QHS OU  Allergies: penicillin (Hives; Blisters)    nVAcc +2.75 readers J1 OU  EOM Full OU  P R/R no RAPD OU  CF Full OU    Exam   LLA WNL OU  C/S W/Q OU  K clear OU  AC formed OU  Iris flat, reactive OU  IOP 12/14     DFE M1%/ N2.5%  Lens: PCIOL OU  Vit: clear OU  D: 0.7/0.6 sharp and pink OU  M: flat OU  V: normal course and caliber OU  P: intact 360 OU, far peripheral drusen 360 OU

## 2019-07-05 NOTE — DISCHARGE NOTE PROVIDER - NSDCCPCAREPLAN_GEN_ALL_CORE_FT
PRINCIPAL DISCHARGE DIAGNOSIS  Diagnosis: Mitral regurgitation  Assessment and Plan of Treatment: It is very important that you continue your medications as prescribed after you are discharged.  You should refer to the medication reconciliation form provided to you on your discharge to information as to what medications to take and when.    Additionally, your medications have been sent to a pharmacy.  These medications should be picked up from your pharmacy on the DAY OF DISCHARGE.  This helps to ensure that you have the appropriate medications available while at home.  If you have any issues obtaining your medications on the day of your discharge, please call [phone] for further assistance.      SECONDARY DISCHARGE DIAGNOSES  Diagnosis: Atrial fibrillation  Assessment and Plan of Treatment: You are being discharged home on your blood thinning medication Eliquis which reduces your risk of stroke caused by Atrial fibrillation.    Because Eliquis is a blood thinner, you are at increased risk for bleeding and should seek emergent medical care at your nearest emergency room if you are involved in a car accident, fall and hit your head, sustain a cut that will not stop bleeding after holding pressure, or notice large amounts of blood in your stool.    Diagnosis: Vision disturbance  Assessment and Plan of Treatment: You were noted to have a brief episode of visual disturbance while you were in the hospital and were evaluated by the ophthalmology team while you were here.   We recommend that you keep your scheduled appointment with your own ophthalmologist and contact him should you develop any further vision changes while at home.

## 2019-07-05 NOTE — DISCHARGE NOTE PROVIDER - NSDCCPTREATMENT_GEN_ALL_CORE_FT
PRINCIPAL PROCEDURE  Procedure: Percutaneous transcatheter repair of mitral valve using multiple leaflet clips  Findings and Treatment:

## 2019-07-05 NOTE — PROGRESS NOTE ADULT - SUBJECTIVE AND OBJECTIVE BOX
S/p Trans  catheter Mitral Valve Repair  , Mitral regurgitation     doing well post op     PAST MEDICAL & SURGICAL HISTORY:  HTN (hypertension)  Atrial fibrillation  Mitral regurgitation  Diastolic heart failure  H/O total hysterectomy  H/O exploratory laparotomy    MEDICATIONS  (STANDING):  aspirin enteric coated 81 milliGRAM(s) Oral daily  docusate sodium 100 milliGRAM(s) Oral three times a day  latanoprost 0.005% Ophthalmic Solution 1 Drop(s) Both EYES at bedtime  pantoprazole    Tablet 40 milliGRAM(s) Oral before breakfast  senna 2 Tablet(s) Oral at bedtime  sodium chloride 0.9% lock flush 3 milliLiter(s) IV Push every 8 hours  sodium chloride 0.9%. 1000 milliLiter(s) (30 mL/Hr) IV Continuous <Continuous>  sodium chloride 0.9%. 1000 milliLiter(s) (10 mL/Hr) IV Continuous <Continuous>    MEDICATIONS  (PRN):  polyethylene glycol 3350 17 Gram(s) Oral daily PRN Constipation  ICU Vital Signs Last 24 Hrs  T(C): 37.8 (05 Jul 2019 09:35), Max: 37.8 (05 Jul 2019 09:35)  T(F): 100 (05 Jul 2019 09:35), Max: 100 (05 Jul 2019 09:35)  HR: 70 (05 Jul 2019 09:01) (60 - 72)  BP: 109/73 (05 Jul 2019 09:01) (90/50 - 137/73)  BP(mean): 88 (05 Jul 2019 09:01) (64 - 103)  ABP: 102/50 (04 Jul 2019 13:00) (100/48 - 116/52)  ABP(mean): 68 (04 Jul 2019 13:00) (66 - 76)  RR: 16 (05 Jul 2019 09:01) (16 - 19)  SpO2: 98% (05 Jul 2019 09:01) (95% - 100%)      Lungs clear except for decreased BS at L base  Cv s1 s2   abd soft     ext stable    CXR L Basilar opacity   Echo    Severe pulmonary hypertension   severe TR  Mod MR  Severe LA  RA dilatation    L:V H   RV  volume overload     EF 55 %                            12.6   6.29  )-----------( 139      ( 05 Jul 2019 05:59 )             37.9   07-05    143  |  107  |  21  ----------------------------<  106<H>  3.9   |  26  |  1.04    Ca    9.2      05 Jul 2019 05:59  Phos  3.5     07-05  Mg     2.0     07-05    TPro  6.2  /  Alb  3.8  /  TBili  0.8  /  DBili  x   /  AST  26  /  ALT  14  /  AlkPhos  73  07-04      ekg NSR RBBB

## 2019-07-06 ENCOUNTER — TRANSCRIPTION ENCOUNTER (OUTPATIENT)
Age: 84
End: 2019-07-06

## 2019-07-06 VITALS — TEMPERATURE: 98 F

## 2019-07-06 LAB
ANION GAP SERPL CALC-SCNC: 10 MMOL/L — SIGNIFICANT CHANGE UP (ref 5–17)
APTT BLD: 31.1 SEC — SIGNIFICANT CHANGE UP (ref 27.5–36.3)
BUN SERPL-MCNC: 14 MG/DL — SIGNIFICANT CHANGE UP (ref 7–23)
CALCIUM SERPL-MCNC: 9 MG/DL — SIGNIFICANT CHANGE UP (ref 8.4–10.5)
CHLORIDE SERPL-SCNC: 108 MMOL/L — SIGNIFICANT CHANGE UP (ref 96–108)
CO2 SERPL-SCNC: 24 MMOL/L — SIGNIFICANT CHANGE UP (ref 22–31)
CREAT SERPL-MCNC: 0.77 MG/DL — SIGNIFICANT CHANGE UP (ref 0.5–1.3)
GLUCOSE SERPL-MCNC: 89 MG/DL — SIGNIFICANT CHANGE UP (ref 70–99)
HCT VFR BLD CALC: 38.2 % — SIGNIFICANT CHANGE UP (ref 34.5–45)
HGB BLD-MCNC: 12.3 G/DL — SIGNIFICANT CHANGE UP (ref 11.5–15.5)
INR BLD: 1.21 — HIGH (ref 0.88–1.16)
MAGNESIUM SERPL-MCNC: 1.9 MG/DL — SIGNIFICANT CHANGE UP (ref 1.6–2.6)
MCHC RBC-ENTMCNC: 30.4 PG — SIGNIFICANT CHANGE UP (ref 27–34)
MCHC RBC-ENTMCNC: 32.2 GM/DL — SIGNIFICANT CHANGE UP (ref 32–36)
MCV RBC AUTO: 94.3 FL — SIGNIFICANT CHANGE UP (ref 80–100)
NRBC # BLD: 0 /100 WBCS — SIGNIFICANT CHANGE UP (ref 0–0)
PLATELET # BLD AUTO: 126 K/UL — LOW (ref 150–400)
POTASSIUM SERPL-MCNC: 4.3 MMOL/L — SIGNIFICANT CHANGE UP (ref 3.5–5.3)
POTASSIUM SERPL-SCNC: 4.3 MMOL/L — SIGNIFICANT CHANGE UP (ref 3.5–5.3)
PROTHROM AB SERPL-ACNC: 13.8 SEC — HIGH (ref 10–12.9)
RBC # BLD: 4.05 M/UL — SIGNIFICANT CHANGE UP (ref 3.8–5.2)
RBC # FLD: 14.2 % — SIGNIFICANT CHANGE UP (ref 10.3–14.5)
SODIUM SERPL-SCNC: 142 MMOL/L — SIGNIFICANT CHANGE UP (ref 135–145)
WBC # BLD: 4.44 K/UL — SIGNIFICANT CHANGE UP (ref 3.8–10.5)
WBC # FLD AUTO: 4.44 K/UL — SIGNIFICANT CHANGE UP (ref 3.8–10.5)

## 2019-07-06 PROCEDURE — 99232 SBSQ HOSP IP/OBS MODERATE 35: CPT

## 2019-07-06 PROCEDURE — 70450 CT HEAD/BRAIN W/O DYE: CPT | Mod: 26

## 2019-07-06 RX ORDER — DOCUSATE SODIUM 100 MG
1 CAPSULE ORAL
Qty: 21 | Refills: 0
Start: 2019-07-06 | End: 2019-07-12

## 2019-07-06 RX ORDER — METOPROLOL TARTRATE 50 MG
1 TABLET ORAL
Qty: 0 | Refills: 0 | DISCHARGE

## 2019-07-06 RX ORDER — ASPIRIN/CALCIUM CARB/MAGNESIUM 324 MG
1 TABLET ORAL
Qty: 30 | Refills: 0
Start: 2019-07-06 | End: 2019-08-04

## 2019-07-06 RX ORDER — PANTOPRAZOLE SODIUM 20 MG/1
1 TABLET, DELAYED RELEASE ORAL
Qty: 30 | Refills: 0
Start: 2019-07-06 | End: 2019-08-04

## 2019-07-06 RX ORDER — APIXABAN 2.5 MG/1
1 TABLET, FILM COATED ORAL
Qty: 60 | Refills: 0
Start: 2019-07-06 | End: 2019-08-04

## 2019-07-06 RX ORDER — LOSARTAN POTASSIUM 100 MG/1
1 TABLET, FILM COATED ORAL
Qty: 0 | Refills: 0 | DISCHARGE

## 2019-07-06 RX ORDER — APIXABAN 2.5 MG/1
1 TABLET, FILM COATED ORAL
Qty: 0 | Refills: 0 | DISCHARGE

## 2019-07-06 RX ORDER — METOPROLOL TARTRATE 50 MG
1 TABLET ORAL
Qty: 30 | Refills: 0
Start: 2019-07-06 | End: 2019-08-04

## 2019-07-06 RX ADMIN — Medication 81 MILLIGRAM(S): at 11:50

## 2019-07-06 RX ADMIN — Medication 12.5 MILLIGRAM(S): at 06:40

## 2019-07-06 RX ADMIN — PANTOPRAZOLE SODIUM 40 MILLIGRAM(S): 20 TABLET, DELAYED RELEASE ORAL at 06:40

## 2019-07-06 RX ADMIN — APIXABAN 2.5 MILLIGRAM(S): 2.5 TABLET, FILM COATED ORAL at 06:40

## 2019-07-06 RX ADMIN — Medication 100 MILLIGRAM(S): at 11:50

## 2019-07-06 RX ADMIN — SODIUM CHLORIDE 3 MILLILITER(S): 9 INJECTION INTRAMUSCULAR; INTRAVENOUS; SUBCUTANEOUS at 11:50

## 2019-07-06 RX ADMIN — SODIUM CHLORIDE 3 MILLILITER(S): 9 INJECTION INTRAMUSCULAR; INTRAVENOUS; SUBCUTANEOUS at 06:37

## 2019-07-06 RX ADMIN — Medication 100 MILLIGRAM(S): at 06:40

## 2019-07-06 NOTE — DISCHARGE NOTE NURSING/CASE MANAGEMENT/SOCIAL WORK - NSDCFUADDAPPT_GEN_ALL_CORE_FT
- Please follow up with Dr. Rojas on Monday 7/15/19 at 11:15AM. The office is located at [facility], [facility], 4th floor. Call us with any questions #[phone].  - Please also follow up with Dr. Bonilla (referring MD) within 2-4 weeks. Please call their office on Monday to schedule an appointment.   - Ophthalmology: Please keep your scheduled appointment with your ophthalmologist.

## 2019-07-06 NOTE — PROGRESS NOTE ADULT - SUBJECTIVE AND OBJECTIVE BOX
Patient discussed on morning rounds with Dr. Rojas    Operation / Date:  7/3/19: MV Repair, transcatheter with russell cookie clasp x 2    Surgeon: Dr. Rojas    Referring Physician: Dr. Bonilla    SUBJECTIVE ASSESSMENT:  89y Female seen and examined bedside. Patient states she feels ready to be discharged today after she gets her CT Head (after an episode of visual disturbance on 7/4/19, which has completely resolved). Denies chest pain, SOB, palpitations, hemopytsis, N/V/D, abdominal pain, dizziness, fever or chills. Patient is ambulating, tolerating PO diet, and voiding spontaneously.     AND HOSPITAL COURSE:      Vital Signs Last 24 Hrs  T(C): 37.1 (06 Jul 2019 08:41), Max: 37.3 (06 Jul 2019 01:00)  T(F): 98.7 (06 Jul 2019 08:41), Max: 99.1 (06 Jul 2019 01:00)  HR: 60 (06 Jul 2019 08:41) (60 - 79)  BP: 109/81 (06 Jul 2019 08:41) (105/65 - 138/83)  BP(mean): 94 (06 Jul 2019 08:41) (85 - 110)  RR: 18 (06 Jul 2019 08:41) (16 - 18)  SpO2: 97% (06 Jul 2019 08:41) (97% - 98%)    EPICARDIAL WIRES REMOVED: n/a  TIE DOWNS REMOVED:  n/a    PHYSICAL EXAM:    General: Patient lying comfortably in bed, no acute distress     Neurological: Alert and oriented. No focal neurological deficits     Cardiovascular: S1S2, irregular, rate controlled, no murmurs appreciated on exam     Respiratory: Clear to ausculation bilaterally, no wheezes rales or rhonchi    Gastrointestinal: Abdomen soft, non tender, non distended     Extremities: Warm and well perfused. No peripheral edema or calf tenderness     Vascular: Peripheral pulses 2+ b/l.    Incision Sites: Groin sites: c/d/i, soft no hematoma.     LABS:                        12.3   4.44  )-----------( 126      ( 06 Jul 2019 06:13 )             38.2       COUMADIN:  No.      PT/INR - ( 06 Jul 2019 06:13 )   PT: 13.8 sec;   INR: 1.21          PTT - ( 06 Jul 2019 06:13 )  PTT:31.1 sec    07-06    142  |  108  |  14  ----------------------------<  89  4.3   |  24  |  0.77    Ca    9.0      06 Jul 2019 06:13  Phos  3.5     07-05  Mg     1.9     07-06            Discharge CXR: < from: Xray Chest 1 View- PORTABLE-Routine (07.05.19 @ 07:20) >    EXAM:  XR CHEST PORTABLE ROUTINE 1V                          PROCEDURE DATE:  07/05/2019          INTERPRETATION:  Clinical history/reason for exam: Postop mitral clip.    Single frontal view.    Comparison: July 4, 2019.    Findings/  impression: Stable cardiomegaly and left basilar opacity.    < end of copied text >      Discharge ECHO:   < from: Echocardiogram (07.05.19 @ 12:06) >    EXAM:  ECHOCARDIOGRAM (CARDIOL)                          PROCEDURE DATE:  07/05/2019          INTERPRETATION:  Systolic Pressure: 109 mmHg  Diastolic Pressure: 73 mmHg  Interpretation Summary  Normal left ventricular size and wall thickness. Abnormal (paradoxical)   septal   motion consistent with RV volume overload. The left ventricular ejection   fraction is normal. The left ventricular ejection fraction is 55%.  The   right   ventricle is normal in size and function.The left atrium is severely   dilated.   The right atrium is severely dilated. Iatrogenic atrial septal defect is   seen   with predominantly left to right flow.  Minimally calcified trileaflet   aortic   valve. There is mild to moderate aortic regurgitation.  Cookie device x  2 is   seen in the mitral position. There is moderate, eccentric, anteriorly   directed   mitral regurgitation. The mean transmitral gradient is 4 mmHg at a heart   rate   of 69 bpm.  Structurally normal tricuspid valve. There is severe   tricuspid   regurgitation. There is severe pulmonary hypertension. The pulmonary   artery   systolic pressure is estimated to be 60 mmHg.  Structurally normal   pulmonic   valve. There is trace pulmonic regurgitation.A trivial pericardial   effusion   noted.The IVC is dilated (>2.1 cm) with an abnormal inspiratory collapse   (<50%) consistent with elevated right atrial pressure.    < end of copied text > Patient discussed on morning rounds with Dr. Rojas    Operation / Date:  7/3/19: MV Repair, transcatheter with montano cookie clasp x 2    Surgeon: Dr. Rojas    Referring Physician: Dr. Bonilla    SUBJECTIVE ASSESSMENT:  89y Female seen and examined bedside. Patient states she feels ready to be discharged today after she gets her CT Head (after an episode of visual disturbance on 7/4/19, which has completely resolved). Denies chest pain, SOB, palpitations, hemopytsis, N/V/D, abdominal pain, dizziness, fever or chills. Patient is ambulating, tolerating PO diet, and voiding spontaneously.     AND HOSPITAL COURSE:  This is a 89 year old female with history of HTN, GIB, Afib on Eliquis, chronic diastolic CHF with, EF 60% and known severe MR referred to Dr. Rojas for procedural intervention and deemed a candidate after completion of pre-operative assessment which was significant for MESFIN showing severely dilated left and right atria, mitral valve prolapse with flail posterior leaflet ( P2 and P3 scallops). Severe eccentric anteriorly directed mitral valve regurgitation. Aortic sclerosis with mild AI, severe TR and severe pulmonary hypertension, PASP 60mmhg.  On 7/2/19, she was admitted to [facility] under the care of Dr. Rojas for planned procedure which was delayed as patient had taken her Eliquis the day prior.  She was instead admitted to floor care and on 7/3/19 underwent uncomplicated TF transcatheter MV repair with Montano Cookie Clasp x 2.  She arrived to mini-ICU care post-procedure extubated in stable condition.  On 7/4/19, patient developed isolated brief episode of visual disturbance in right eye suggestive of unilateral hemianopsia vs. amaurosis fugax.  Ophthalmology consulted and evaluated patient with recommendation of b/l carotid dopplers, which was performed on 7/5/19 revealing no hemodynamically significant stenosis. Neurology also consulted recommending MRI brain, however patient refused, therefore underwent CT Head noncon revealing no acute findings, orbital contents unremarkable. On 7/5/19, ECHO in AM showed EF 55%, severely dilated LA and RA, iatrogenic ASD (by design) with predominantly left to right flow, mild to mod AR, Cookie device x2 seen in mitral position, moderate eccentric anteriorly directed MR, mean transmitral gradient is 4mmHg at HR of 69bpm, trivial pericardial effusion.  Patient evaluated by Dr. Munoz and medically cleared for discharge to home with plan for outpatient follow-up.  Of note, patients home losartan was held as her SBPs have remained in 100-110s, and should be revisited as outpatient. Per Dr. Rojas, patient will not be sent home on Lasix, but has been provided instruction for low sodium diet, fluid restriction and instructions to call if patient experiences 3lb weight gain in one day or 5lb weight gain in one week.   Over 35 minutes was spent with the patient reviewing the discharge material including medications, follow up appointments, recovery, concerning symptoms, and how to contact their health care providers if they have questions.      Vital Signs Last 24 Hrs  T(C): 37.1 (06 Jul 2019 08:41), Max: 37.3 (06 Jul 2019 01:00)  T(F): 98.7 (06 Jul 2019 08:41), Max: 99.1 (06 Jul 2019 01:00)  HR: 60 (06 Jul 2019 08:41) (60 - 79)  BP: 109/81 (06 Jul 2019 08:41) (105/65 - 138/83)  BP(mean): 94 (06 Jul 2019 08:41) (85 - 110)  RR: 18 (06 Jul 2019 08:41) (16 - 18)  SpO2: 97% (06 Jul 2019 08:41) (97% - 98%)    EPICARDIAL WIRES REMOVED: n/a  TIE DOWNS REMOVED:  n/a    PHYSICAL EXAM:    General: Patient lying comfortably in bed, no acute distress     Neurological: Alert and oriented. No focal neurological deficits     Cardiovascular: S1S2, irregular, rate controlled, no murmurs appreciated on exam     Respiratory: Clear to ausculation bilaterally, no wheezes rales or rhonchi    Gastrointestinal: Abdomen soft, non tender, non distended     Extremities: Warm and well perfused. No peripheral edema or calf tenderness     Vascular: Peripheral pulses 2+ b/l.    Incision Sites: Groin sites: c/d/i, soft no hematoma.     LABS:                        12.3   4.44  )-----------( 126      ( 06 Jul 2019 06:13 )             38.2       COUMADIN:  No.      PT/INR - ( 06 Jul 2019 06:13 )   PT: 13.8 sec;   INR: 1.21          PTT - ( 06 Jul 2019 06:13 )  PTT:31.1 sec    07-06    142  |  108  |  14  ----------------------------<  89  4.3   |  24  |  0.77    Ca    9.0      06 Jul 2019 06:13  Phos  3.5     07-05  Mg     1.9     07-06            Discharge CXR: < from: Xray Chest 1 View- PORTABLE-Routine (07.05.19 @ 07:20) >    EXAM:  XR CHEST PORTABLE ROUTINE 1V                          PROCEDURE DATE:  07/05/2019          INTERPRETATION:  Clinical history/reason for exam: Postop mitral clip.    Single frontal view.    Comparison: July 4, 2019.    Findings/  impression: Stable cardiomegaly and left basilar opacity.    < end of copied text >      Discharge ECHO:   < from: Echocardiogram (07.05.19 @ 12:06) >    EXAM:  ECHOCARDIOGRAM (CARDIOL)                          PROCEDURE DATE:  07/05/2019          INTERPRETATION:  Systolic Pressure: 109 mmHg  Diastolic Pressure: 73 mmHg  Interpretation Summary  Normal left ventricular size and wall thickness. Abnormal (paradoxical)   septal   motion consistent with RV volume overload. The left ventricular ejection   fraction is normal. The left ventricular ejection fraction is 55%.  The   right   ventricle is normal in size and function.The left atrium is severely   dilated.   The right atrium is severely dilated. Iatrogenic atrial septal defect is   seen   with predominantly left to right flow.  Minimally calcified trileaflet   aortic   valve. There is mild to moderate aortic regurgitation.  Cookie device x  2 is   seen in the mitral position. There is moderate, eccentric, anteriorly   directed   mitral regurgitation. The mean transmitral gradient is 4 mmHg at a heart   rate   of 69 bpm.  Structurally normal tricuspid valve. There is severe   tricuspid   regurgitation. There is severe pulmonary hypertension. The pulmonary   artery   systolic pressure is estimated to be 60 mmHg.  Structurally normal   pulmonic   valve. There is trace pulmonic regurgitation.A trivial pericardial   effusion   noted.The IVC is dilated (>2.1 cm) with an abnormal inspiratory collapse   (<50%) consistent with elevated right atrial pressure.    < end of copied text >

## 2019-07-06 NOTE — PROGRESS NOTE ADULT - SUBJECTIVE AND OBJECTIVE BOX
INTERVAL HISTORY:  No further visual symptoms. Carotid duplex without significant narrowing. CT head performed this afternoon - no acute infarction     REVIEW OF SYSTEMS:  As per HPI, otherwise negative for Constitutional, Eyes, Ears/Nose/Mouth/Throat, Neck, Cardiovascular, Respiratory, Gastrointestinal, Genitourinary, Skin, Endocrine, Musculoskeletal, Psychiatric, and Hematologic/Lymphatic.    VITAL SIGNS:  Vital Signs Last 24 Hrs  T(C): 36.9 (06 Jul 2019 14:00), Max: 37.3 (06 Jul 2019 01:00)  T(F): 98.4 (06 Jul 2019 14:00), Max: 99.1 (06 Jul 2019 01:00)  HR: 70 (06 Jul 2019 11:53) (60 - 79)  BP: 153/89 (06 Jul 2019 11:53) (105/65 - 153/89)  BP(mean): 112 (06 Jul 2019 11:53) (85 - 112)  RR: 18 (06 Jul 2019 11:53) (16 - 18)  SpO2: 97% (06 Jul 2019 11:53) (97% - 97%)    PHYSICAL EXAMINATION:  Visual fields full in each eye  rest of cranial nerves normal  strength and sensation symmetric

## 2019-07-06 NOTE — PROGRESS NOTE ADULT - REASON FOR ADMISSION
mitral regurgitation

## 2019-07-06 NOTE — PROGRESS NOTE ADULT - SUBJECTIVE AND OBJECTIVE BOX
S/p  Mitral  Valve  Repair      ECHO    severeTR, moderate  MR AR   severe Pulm HTN   dilated LA     EF 55 %   ASD noted   Left to Right flow   Saulo device  in mitral;position       No dyspnea     ambulating with walker     PAST MEDICAL & SURGICAL HISTORY:  HTN (hypertension)  Atrial fibrillation  Mitral regurgitation  Diastolic heart failure  H/O total hysterectomy  H/O exploratory laparotomy      MEDICATIONS  (STANDING):  apixaban 2.5 milliGRAM(s) Oral every 12 hours  aspirin enteric coated 81 milliGRAM(s) Oral daily  docusate sodium 100 milliGRAM(s) Oral three times a day  latanoprost 0.005% Ophthalmic Solution 1 Drop(s) Both EYES at bedtime  metoprolol tartrate 12.5 milliGRAM(s) Oral every 12 hours  pantoprazole    Tablet 40 milliGRAM(s) Oral before breakfast  senna 2 Tablet(s) Oral at bedtime  sodium chloride 0.9% lock flush 3 milliLiter(s) IV Push every 8 hours    MEDICATIONS  (PRN):  polyethylene glycol 3350 17 Gram(s) Oral daily PRN Constipation    ICU Vital Signs Last 24 Hrs  T(C): 37.1 (06 Jul 2019 08:41), Max: 37.3 (06 Jul 2019 01:00)  T(F): 98.7 (06 Jul 2019 08:41), Max: 99.1 (06 Jul 2019 01:00)  HR: 60 (06 Jul 2019 08:41) (60 - 79)  BP: 109/81 (06 Jul 2019 08:41) (105/65 - 138/83)  BP(mean): 94 (06 Jul 2019 08:41) (85 - 110)  ABP: --  ABP(mean): --  RR: 18 (06 Jul 2019 08:41) (16 - 18)  SpO2: 97% (06 Jul 2019 08:41) (97% - 98%)      lungs decreased  breath sounds at bases  CXR  Left basal opacity     CV s1 s2    NSR   RBBB    abd soft  ext stable                          12.3   4.44  )-----------( 126      ( 06 Jul 2019 06:13 )             38.2   07-06    142  |  108  |  14  ----------------------------<  89  4.3   |  24  |  0.77    Ca    9.0      06 Jul 2019 06:13  Phos  3.5     07-05  Mg     1.9     07-06    PT/INR - ( 06 Jul 2019 06:13 )   PT: 13.8 sec;   INR: 1.21          PTT - ( 06 Jul 2019 06:13 )  PTT:31.1 sec

## 2019-07-06 NOTE — PROGRESS NOTE ADULT - ASSESSMENT
- Please follow up with Dr. Rojas on Monday 7/15/19 at 11:15AM. The office is located at [facility], [facility], 4th floor. Call us with any questions #[phone].  - Please also follow up with Dr. Bonilla (referring MD) within 2-4 weeks. Please call their office on Monday to schedule an appointment.   - Ophthalmology: Please keep your scheduled appointment with your ophthalmologist.  -Walk daily as tolerated and use your incentive spirometer every hour.  -No driving or strenuous activity/exercise for 6 weeks, or until  cleared by your surgeon.  -Gently clean your incisions with anti-bacterial soap and water, pat  dry.  You may leave them open to air.  -Call your doctor if you have shortness of breath, chest pain not  relieved by pain medication, dizziness, fever >101.5, or increased  redness or drainage from incisions. - Please follow up with Dr. Rojas on Monday 7/15/19 at 11:15AM. The office is located at [facility], [facility], 4th floor. Call us with any questions #[phone].  - Please also follow up with Dr. Bonilal (referring MD) within 2-4 weeks. Please call their office on Monday to schedule an appointment.   - Ophthalmology: Please keep your scheduled appointment with your ophthalmologist.  ** Please restrict your fluid intake to less than 1.5L per day, or 6 cups per day.   - Please reduce your sodium intake. Limiting sodium in your diet helps minimize the amount of extra fluid around your heart, lungs, and in your legs.  ** Please weigh yourself daily and record it in a notebook, if you notice more than a 3 pound weight gain in 1 day or 5 pounds in one week,  please call your doctor right away.   -Walk daily as tolerated and use your incentive spirometer every hour.  -No driving or strenuous activity/exercise until  cleared by your surgeon.  -Gently clean your incisions with anti-bacterial soap and water, pat  dry.  You may leave them open to air.  -Call your doctor if you have shortness of breath, chest pain not  relieved by pain medication, dizziness, fever >101.5, or increased  redness or drainage from incisions.

## 2019-07-06 NOTE — DISCHARGE NOTE NURSING/CASE MANAGEMENT/SOCIAL WORK - NSDCDPATPORTLINK_GEN_ALL_CORE
You can access the [facility], offered by [facility], by registering with the following website: [email or web address]

## 2019-07-06 NOTE — PROGRESS NOTE ADULT - ASSESSMENT
Etiology of event is unclear; one possibility is a small embolus to eye or visual cortex while not on anticoagulation  Continue anticoagulation,   No further inpatient neurologic workup indicated at this time

## 2019-07-08 ENCOUNTER — APPOINTMENT (OUTPATIENT)
Age: 84
End: 2019-07-08
Payer: MEDICARE

## 2019-07-08 ENCOUNTER — INBOUND DOCUMENT (OUTPATIENT)
Age: 84
End: 2019-07-08

## 2019-07-08 PROCEDURE — 99024 POSTOP FOLLOW-UP VISIT: CPT

## 2019-07-10 VITALS
OXYGEN SATURATION: 98 % | RESPIRATION RATE: 18 BRPM | DIASTOLIC BLOOD PRESSURE: 71 MMHG | TEMPERATURE: 98.1 F | SYSTOLIC BLOOD PRESSURE: 121 MMHG | HEART RATE: 74 BPM

## 2019-07-10 NOTE — ASSESSMENT
[FreeTextEntry1] : Ms. Arndt is a 89 Y/O patient of Dr. Rojas S/P MVR,  Visited patient in her home today, patient appears generally well, ambulating with out assistance, patient is performing all of her own ADL's with out assistance. no LE Edema present, no active distress noted.

## 2019-07-10 NOTE — PHYSICAL EXAM
[Sclera] : the sclera and conjunctiva were normal [] : no respiratory distress [Neck Appearance] : the appearance of the neck was normal [Examination Of The Chest] : the chest was normal in appearance [Bowel Sounds] : normal bowel sounds [Abnormal Walk] : normal gait [FreeTextEntry1] : Bilateral Groin area C/D/I

## 2019-07-15 ENCOUNTER — APPOINTMENT (OUTPATIENT)
Dept: CARDIOTHORACIC SURGERY | Facility: CLINIC | Age: 84
End: 2019-07-15
Payer: MEDICARE

## 2019-07-15 DIAGNOSIS — I07.1 RHEUMATIC TRICUSPID INSUFFICIENCY: ICD-10-CM

## 2019-07-15 DIAGNOSIS — H53.9 UNSPECIFIED VISUAL DISTURBANCE: ICD-10-CM

## 2019-07-15 DIAGNOSIS — I70.0 ATHEROSCLEROSIS OF AORTA: ICD-10-CM

## 2019-07-15 DIAGNOSIS — I34.0 NONRHEUMATIC MITRAL (VALVE) INSUFFICIENCY: ICD-10-CM

## 2019-07-15 DIAGNOSIS — Z79.01 LONG TERM (CURRENT) USE OF ANTICOAGULANTS: ICD-10-CM

## 2019-07-15 DIAGNOSIS — I34.1 NONRHEUMATIC MITRAL (VALVE) PROLAPSE: ICD-10-CM

## 2019-07-15 DIAGNOSIS — I48.0 PAROXYSMAL ATRIAL FIBRILLATION: ICD-10-CM

## 2019-07-15 DIAGNOSIS — Z88.0 ALLERGY STATUS TO PENICILLIN: ICD-10-CM

## 2019-07-15 DIAGNOSIS — I50.32 CHRONIC DIASTOLIC (CONGESTIVE) HEART FAILURE: ICD-10-CM

## 2019-07-15 DIAGNOSIS — I11.0 HYPERTENSIVE HEART DISEASE WITH HEART FAILURE: ICD-10-CM

## 2019-07-15 DIAGNOSIS — Z90.710 ACQUIRED ABSENCE OF BOTH CERVIX AND UTERUS: ICD-10-CM

## 2019-07-15 DIAGNOSIS — I27.20 PULMONARY HYPERTENSION, UNSPECIFIED: ICD-10-CM

## 2019-07-17 PROCEDURE — 82553 CREATINE MB FRACTION: CPT

## 2019-07-17 PROCEDURE — 84443 ASSAY THYROID STIM HORMONE: CPT

## 2019-07-17 PROCEDURE — 93005 ELECTROCARDIOGRAM TRACING: CPT

## 2019-07-17 PROCEDURE — 83036 HEMOGLOBIN GLYCOSYLATED A1C: CPT

## 2019-07-17 PROCEDURE — 82330 ASSAY OF CALCIUM: CPT

## 2019-07-17 PROCEDURE — C1769: CPT

## 2019-07-17 PROCEDURE — 85027 COMPLETE CBC AUTOMATED: CPT

## 2019-07-17 PROCEDURE — 80053 COMPREHEN METABOLIC PANEL: CPT

## 2019-07-17 PROCEDURE — 85610 PROTHROMBIN TIME: CPT

## 2019-07-17 PROCEDURE — 86901 BLOOD TYPING SEROLOGIC RH(D): CPT

## 2019-07-17 PROCEDURE — 36415 COLL VENOUS BLD VENIPUNCTURE: CPT

## 2019-07-17 PROCEDURE — 93880 EXTRACRANIAL BILAT STUDY: CPT

## 2019-07-17 PROCEDURE — 84484 ASSAY OF TROPONIN QUANT: CPT

## 2019-07-17 PROCEDURE — 86923 COMPATIBILITY TEST ELECTRIC: CPT

## 2019-07-17 PROCEDURE — 83880 ASSAY OF NATRIURETIC PEPTIDE: CPT

## 2019-07-17 PROCEDURE — 97161 PT EVAL LOW COMPLEX 20 MIN: CPT

## 2019-07-17 PROCEDURE — 85730 THROMBOPLASTIN TIME PARTIAL: CPT

## 2019-07-17 PROCEDURE — 83735 ASSAY OF MAGNESIUM: CPT

## 2019-07-17 PROCEDURE — 80061 LIPID PANEL: CPT

## 2019-07-17 PROCEDURE — 82550 ASSAY OF CK (CPK): CPT

## 2019-07-17 PROCEDURE — C1889: CPT

## 2019-07-17 PROCEDURE — 80048 BASIC METABOLIC PNL TOTAL CA: CPT

## 2019-07-17 PROCEDURE — 93312 ECHO TRANSESOPHAGEAL: CPT

## 2019-07-17 PROCEDURE — 93306 TTE W/DOPPLER COMPLETE: CPT

## 2019-07-17 PROCEDURE — 71045 X-RAY EXAM CHEST 1 VIEW: CPT

## 2019-07-17 PROCEDURE — C1894: CPT

## 2019-07-17 PROCEDURE — P9045: CPT

## 2019-07-17 PROCEDURE — 71046 X-RAY EXAM CHEST 2 VIEWS: CPT

## 2019-07-17 PROCEDURE — 86850 RBC ANTIBODY SCREEN: CPT

## 2019-07-17 PROCEDURE — 85025 COMPLETE CBC W/AUTO DIFF WBC: CPT

## 2019-07-17 PROCEDURE — 82803 BLOOD GASES ANY COMBINATION: CPT

## 2019-07-17 PROCEDURE — 84100 ASSAY OF PHOSPHORUS: CPT

## 2019-07-17 PROCEDURE — 86900 BLOOD TYPING SEROLOGIC ABO: CPT

## 2019-07-17 PROCEDURE — 70450 CT HEAD/BRAIN W/O DYE: CPT

## 2019-07-17 PROCEDURE — 84295 ASSAY OF SERUM SODIUM: CPT

## 2019-07-17 PROCEDURE — 84132 ASSAY OF SERUM POTASSIUM: CPT

## 2019-07-17 PROCEDURE — 83605 ASSAY OF LACTIC ACID: CPT

## 2019-07-19 DIAGNOSIS — Z00.6 ENCOUNTER FOR EXAMINATION FOR NORMAL COMPARISON AND CONTROL IN CLINICAL RESEARCH PROGRAM: ICD-10-CM

## 2019-07-22 ENCOUNTER — APPOINTMENT (OUTPATIENT)
Dept: CARDIOTHORACIC SURGERY | Facility: CLINIC | Age: 84
End: 2019-07-22
Payer: MEDICARE

## 2019-07-22 PROCEDURE — 99214 OFFICE O/P EST MOD 30 MIN: CPT

## 2019-07-23 VITALS — DIASTOLIC BLOOD PRESSURE: 82 MMHG | OXYGEN SATURATION: 7 % | HEART RATE: 68 BPM | SYSTOLIC BLOOD PRESSURE: 137 MMHG

## 2019-07-23 NOTE — HISTORY OF PRESENT ILLNESS
[FreeTextEntry1] : 89 year old female with a history of HTN, new atrial fibrillation (on Xarelto) and chronic diastolic heart failure with severe mitral regurgitation s/p trancatheter mitral valve repair using Saulo on 07/3/19 (Clasp IID research trial). \par \par The patient tolerated the procedure well requiring two clasp devices. On 07/4/19, the patient developed a brief isolated disturbance in the right eye. Opthalmology was consulted and recommended carotid dopplers which showed no hemodynamically significant stenosis. Neurology was consulted and recommended a MRI of brain which was refused.  CT head showed no acute findings.  The patient was discharged home without her home dose of Losartan secondary to SBP 100s. \par \par Since her discharge, the patient states she feels well with no complaints. She denies any SOB at rest, or exertion, chest pain, palpitations, LE edema.  She denies any vision changes or headache.

## 2019-07-23 NOTE — PHYSICAL EXAM
[Right Carotid Bruit] : no bruit heard over the right carotid [Right Femoral Bruit] : no bruit heard over the right femoral artery [Left Carotid Bruit] : no bruit heard over the left carotid [FreeTextEntry1] : 1+ bilateral pedal edema [Left Femoral Bruit] : no bruit heard over the left femoral artery

## 2019-07-28 ENCOUNTER — FORM ENCOUNTER (OUTPATIENT)
Age: 84
End: 2019-07-28

## 2019-07-29 ENCOUNTER — APPOINTMENT (OUTPATIENT)
Dept: CARDIOTHORACIC SURGERY | Facility: CLINIC | Age: 84
End: 2019-07-29
Payer: MEDICARE

## 2019-07-29 ENCOUNTER — OUTPATIENT (OUTPATIENT)
Dept: OUTPATIENT SERVICES | Facility: HOSPITAL | Age: 84
LOS: 1 days | End: 2019-07-29
Payer: MEDICARE

## 2019-07-29 VITALS
TEMPERATURE: 96.8 F | DIASTOLIC BLOOD PRESSURE: 100 MMHG | WEIGHT: 125 LBS | OXYGEN SATURATION: 97 % | HEART RATE: 72 BPM | SYSTOLIC BLOOD PRESSURE: 174 MMHG | BODY MASS INDEX: 20.09 KG/M2 | RESPIRATION RATE: 18 BRPM | HEIGHT: 66 IN

## 2019-07-29 DIAGNOSIS — Z98.890 OTHER SPECIFIED POSTPROCEDURAL STATES: Chronic | ICD-10-CM

## 2019-07-29 DIAGNOSIS — I35.1 NONRHEUMATIC AORTIC (VALVE) INSUFFICIENCY: ICD-10-CM

## 2019-07-29 DIAGNOSIS — Z90.710 ACQUIRED ABSENCE OF BOTH CERVIX AND UTERUS: Chronic | ICD-10-CM

## 2019-07-29 DIAGNOSIS — Z00.6 ENCOUNTER FOR EXAMINATION FOR NORMAL COMPARISON AND CONTROL IN CLINICAL RESEARCH PROGRAM: ICD-10-CM

## 2019-07-29 LAB
ALBUMIN SERPL ELPH-MCNC: 4.4 G/DL — SIGNIFICANT CHANGE UP (ref 3.3–5)
ALP SERPL-CCNC: 116 U/L — SIGNIFICANT CHANGE UP (ref 40–120)
ALT FLD-CCNC: 16 U/L — SIGNIFICANT CHANGE UP (ref 10–45)
ANION GAP SERPL CALC-SCNC: 11 MMOL/L — SIGNIFICANT CHANGE UP (ref 5–17)
APTT BLD: 37 SEC — HIGH (ref 27.5–36.3)
AST SERPL-CCNC: 22 U/L — SIGNIFICANT CHANGE UP (ref 10–40)
BASOPHILS # BLD AUTO: 0.04 K/UL — SIGNIFICANT CHANGE UP (ref 0–0.2)
BASOPHILS NFR BLD AUTO: 0.9 % — SIGNIFICANT CHANGE UP (ref 0–2)
BILIRUB SERPL-MCNC: 0.5 MG/DL — SIGNIFICANT CHANGE UP (ref 0.2–1.2)
BUN SERPL-MCNC: 15 MG/DL — SIGNIFICANT CHANGE UP (ref 7–23)
CALCIUM SERPL-MCNC: 9.6 MG/DL — SIGNIFICANT CHANGE UP (ref 8.4–10.5)
CHLORIDE SERPL-SCNC: 108 MMOL/L — SIGNIFICANT CHANGE UP (ref 96–108)
CHOLEST SERPL-MCNC: 182 MG/DL — SIGNIFICANT CHANGE UP (ref 10–199)
CK MB CFR SERPL CALC: 2.4 NG/ML — SIGNIFICANT CHANGE UP (ref 0–6.7)
CK SERPL-CCNC: 120 U/L — SIGNIFICANT CHANGE UP (ref 25–170)
CO2 SERPL-SCNC: 26 MMOL/L — SIGNIFICANT CHANGE UP (ref 22–31)
CREAT SERPL-MCNC: 0.86 MG/DL — SIGNIFICANT CHANGE UP (ref 0.5–1.3)
EOSINOPHIL # BLD AUTO: 0.09 K/UL — SIGNIFICANT CHANGE UP (ref 0–0.5)
EOSINOPHIL NFR BLD AUTO: 2 % — SIGNIFICANT CHANGE UP (ref 0–6)
GGT SERPL-CCNC: 30 U/L — SIGNIFICANT CHANGE UP (ref 8–40)
GLUCOSE SERPL-MCNC: 84 MG/DL — SIGNIFICANT CHANGE UP (ref 70–99)
HCT VFR BLD CALC: 43 % — SIGNIFICANT CHANGE UP (ref 34.5–45)
HDLC SERPL-MCNC: 71 MG/DL — SIGNIFICANT CHANGE UP
HGB BLD-MCNC: 13.7 G/DL — SIGNIFICANT CHANGE UP (ref 11.5–15.5)
IMM GRANULOCYTES NFR BLD AUTO: 0.2 % — SIGNIFICANT CHANGE UP (ref 0–1.5)
INR BLD: 1.35 — HIGH (ref 0.88–1.16)
LIPID PNL WITH DIRECT LDL SERPL: 92 MG/DL — SIGNIFICANT CHANGE UP
LYMPHOCYTES # BLD AUTO: 0.91 K/UL — LOW (ref 1–3.3)
LYMPHOCYTES # BLD AUTO: 19.9 % — SIGNIFICANT CHANGE UP (ref 13–44)
MCHC RBC-ENTMCNC: 30 PG — SIGNIFICANT CHANGE UP (ref 27–34)
MCHC RBC-ENTMCNC: 31.9 GM/DL — LOW (ref 32–36)
MCV RBC AUTO: 94.1 FL — SIGNIFICANT CHANGE UP (ref 80–100)
MONOCYTES # BLD AUTO: 0.25 K/UL — SIGNIFICANT CHANGE UP (ref 0–0.9)
MONOCYTES NFR BLD AUTO: 5.5 % — SIGNIFICANT CHANGE UP (ref 2–14)
NEUTROPHILS # BLD AUTO: 3.27 K/UL — SIGNIFICANT CHANGE UP (ref 1.8–7.4)
NEUTROPHILS NFR BLD AUTO: 71.5 % — SIGNIFICANT CHANGE UP (ref 43–77)
NRBC # BLD: 0 /100 WBCS — SIGNIFICANT CHANGE UP (ref 0–0)
PLATELET # BLD AUTO: 233 K/UL — SIGNIFICANT CHANGE UP (ref 150–400)
POTASSIUM SERPL-MCNC: 4 MMOL/L — SIGNIFICANT CHANGE UP (ref 3.5–5.3)
POTASSIUM SERPL-SCNC: 4 MMOL/L — SIGNIFICANT CHANGE UP (ref 3.5–5.3)
PROT SERPL-MCNC: 7.2 G/DL — SIGNIFICANT CHANGE UP (ref 6–8.3)
PROTHROM AB SERPL-ACNC: 15.4 SEC — HIGH (ref 10–12.9)
RBC # BLD: 4.57 M/UL — SIGNIFICANT CHANGE UP (ref 3.8–5.2)
RBC # FLD: 14.1 % — SIGNIFICANT CHANGE UP (ref 10.3–14.5)
SODIUM SERPL-SCNC: 145 MMOL/L — SIGNIFICANT CHANGE UP (ref 135–145)
TOTAL CHOLESTEROL/HDL RATIO MEASUREMENT: 2.6 RATIO — LOW (ref 3.3–7.1)
TRIGL SERPL-MCNC: 94 MG/DL — SIGNIFICANT CHANGE UP (ref 10–149)
URATE SERPL-MCNC: 4.2 MG/DL — SIGNIFICANT CHANGE UP (ref 2.5–7)
WBC # BLD: 4.57 K/UL — SIGNIFICANT CHANGE UP (ref 3.8–10.5)
WBC # FLD AUTO: 4.57 K/UL — SIGNIFICANT CHANGE UP (ref 3.8–10.5)

## 2019-07-29 PROCEDURE — 82553 CREATINE MB FRACTION: CPT

## 2019-07-29 PROCEDURE — 80061 LIPID PANEL: CPT

## 2019-07-29 PROCEDURE — 82550 ASSAY OF CK (CPK): CPT

## 2019-07-29 PROCEDURE — 36415 COLL VENOUS BLD VENIPUNCTURE: CPT

## 2019-07-29 PROCEDURE — 99024 POSTOP FOLLOW-UP VISIT: CPT

## 2019-07-29 PROCEDURE — 93306 TTE W/DOPPLER COMPLETE: CPT | Mod: 26

## 2019-07-29 PROCEDURE — 85025 COMPLETE CBC W/AUTO DIFF WBC: CPT

## 2019-07-29 PROCEDURE — 85610 PROTHROMBIN TIME: CPT

## 2019-07-29 PROCEDURE — 93010 ELECTROCARDIOGRAM REPORT: CPT

## 2019-07-29 PROCEDURE — 82977 ASSAY OF GGT: CPT

## 2019-07-29 PROCEDURE — 85730 THROMBOPLASTIN TIME PARTIAL: CPT

## 2019-07-29 PROCEDURE — 84550 ASSAY OF BLOOD/URIC ACID: CPT

## 2019-07-29 PROCEDURE — 93306 TTE W/DOPPLER COMPLETE: CPT

## 2019-07-29 PROCEDURE — 93005 ELECTROCARDIOGRAM TRACING: CPT

## 2019-07-29 PROCEDURE — 80053 COMPREHEN METABOLIC PANEL: CPT

## 2019-07-30 DIAGNOSIS — Z00.6 ENCOUNTER FOR EXAMINATION FOR NORMAL COMPARISON AND CONTROL IN CLINICAL RESEARCH PROGRAM: ICD-10-CM

## 2019-07-30 NOTE — HISTORY OF PRESENT ILLNESS
[FreeTextEntry1] : 89 year old female with a history of HTN, new atrial fibrillation (on Xarelto) and chronic diastolic heart failure with severe mitral regurgitation s/p trancatheter mitral valve repair using Saulo on 07/3/19 (Clasp IID research trial) presents for her one month follow up. \par \par Since her last visit, the patient states that she feels well with no complaints of chest pain, palpitations, SOB at rest or with exertion, syncope, dizziness, PND, orthopnea, or LE edema. Pt complains of feeling "sea sick" at times, however, believes it is related to her history of vertigo.

## 2019-08-19 PROBLEM — Z00.6 RESEARCH SUBJECT: Status: ACTIVE | Noted: 2019-08-19

## 2019-12-08 ENCOUNTER — FORM ENCOUNTER (OUTPATIENT)
Age: 84
End: 2019-12-08

## 2019-12-09 ENCOUNTER — APPOINTMENT (OUTPATIENT)
Dept: CARDIOTHORACIC SURGERY | Facility: CLINIC | Age: 84
End: 2019-12-09
Payer: SUBSIDIZED

## 2019-12-09 ENCOUNTER — OUTPATIENT (OUTPATIENT)
Dept: OUTPATIENT SERVICES | Facility: HOSPITAL | Age: 84
LOS: 1 days | End: 2019-12-09
Payer: SUBSIDIZED

## 2019-12-09 DIAGNOSIS — I34.0 NONRHEUMATIC MITRAL (VALVE) INSUFFICIENCY: ICD-10-CM

## 2019-12-09 DIAGNOSIS — Z90.710 ACQUIRED ABSENCE OF BOTH CERVIX AND UTERUS: Chronic | ICD-10-CM

## 2019-12-09 DIAGNOSIS — Z98.890 OTHER SPECIFIED POSTPROCEDURAL STATES: Chronic | ICD-10-CM

## 2019-12-09 DIAGNOSIS — Z00.6 ENCOUNTER FOR EXAMINATION FOR NORMAL COMPARISON AND CONTROL IN CLINICAL RESEARCH PROGRAM: ICD-10-CM

## 2019-12-09 DIAGNOSIS — I05.1 RHEUMATIC MITRAL INSUFFICIENCY: ICD-10-CM

## 2019-12-09 LAB
ALBUMIN SERPL ELPH-MCNC: 4.4 G/DL — SIGNIFICANT CHANGE UP (ref 3.3–5)
ALP SERPL-CCNC: 101 U/L — SIGNIFICANT CHANGE UP (ref 40–120)
ALT FLD-CCNC: 19 U/L — SIGNIFICANT CHANGE UP (ref 10–45)
ANION GAP SERPL CALC-SCNC: 9 MMOL/L — SIGNIFICANT CHANGE UP (ref 5–17)
AST SERPL-CCNC: 26 U/L — SIGNIFICANT CHANGE UP (ref 10–40)
BASOPHILS # BLD AUTO: 0.04 K/UL — SIGNIFICANT CHANGE UP (ref 0–0.2)
BASOPHILS NFR BLD AUTO: 0.8 % — SIGNIFICANT CHANGE UP (ref 0–2)
BILIRUB SERPL-MCNC: 0.5 MG/DL — SIGNIFICANT CHANGE UP (ref 0.2–1.2)
BUN SERPL-MCNC: 12 MG/DL — SIGNIFICANT CHANGE UP (ref 7–23)
CALCIUM SERPL-MCNC: 9.6 MG/DL — SIGNIFICANT CHANGE UP (ref 8.4–10.5)
CHLORIDE SERPL-SCNC: 109 MMOL/L — HIGH (ref 96–108)
CHOLEST SERPL-MCNC: 169 MG/DL — SIGNIFICANT CHANGE UP (ref 10–199)
CO2 SERPL-SCNC: 27 MMOL/L — SIGNIFICANT CHANGE UP (ref 22–31)
CREAT SERPL-MCNC: 0.78 MG/DL — SIGNIFICANT CHANGE UP (ref 0.5–1.3)
EOSINOPHIL # BLD AUTO: 0.1 K/UL — SIGNIFICANT CHANGE UP (ref 0–0.5)
EOSINOPHIL NFR BLD AUTO: 2.1 % — SIGNIFICANT CHANGE UP (ref 0–6)
GGT SERPL-CCNC: 33 U/L — SIGNIFICANT CHANGE UP (ref 8–40)
GLUCOSE SERPL-MCNC: 91 MG/DL — SIGNIFICANT CHANGE UP (ref 70–99)
HCT VFR BLD CALC: 45.2 % — HIGH (ref 34.5–45)
HDLC SERPL-MCNC: 81 MG/DL — SIGNIFICANT CHANGE UP
HGB BLD-MCNC: 14.4 G/DL — SIGNIFICANT CHANGE UP (ref 11.5–15.5)
IMM GRANULOCYTES NFR BLD AUTO: 0.2 % — SIGNIFICANT CHANGE UP (ref 0–1.5)
LIPID PNL WITH DIRECT LDL SERPL: 75 MG/DL — SIGNIFICANT CHANGE UP
LYMPHOCYTES # BLD AUTO: 0.99 K/UL — LOW (ref 1–3.3)
LYMPHOCYTES # BLD AUTO: 20.4 % — SIGNIFICANT CHANGE UP (ref 13–44)
MCHC RBC-ENTMCNC: 29.4 PG — SIGNIFICANT CHANGE UP (ref 27–34)
MCHC RBC-ENTMCNC: 31.9 GM/DL — LOW (ref 32–36)
MCV RBC AUTO: 92.4 FL — SIGNIFICANT CHANGE UP (ref 80–100)
MONOCYTES # BLD AUTO: 0.27 K/UL — SIGNIFICANT CHANGE UP (ref 0–0.9)
MONOCYTES NFR BLD AUTO: 5.6 % — SIGNIFICANT CHANGE UP (ref 2–14)
NEUTROPHILS # BLD AUTO: 3.44 K/UL — SIGNIFICANT CHANGE UP (ref 1.8–7.4)
NEUTROPHILS NFR BLD AUTO: 70.9 % — SIGNIFICANT CHANGE UP (ref 43–77)
NRBC # BLD: 0 /100 WBCS — SIGNIFICANT CHANGE UP (ref 0–0)
PLATELET # BLD AUTO: 197 K/UL — SIGNIFICANT CHANGE UP (ref 150–400)
POTASSIUM SERPL-MCNC: 4 MMOL/L — SIGNIFICANT CHANGE UP (ref 3.5–5.3)
POTASSIUM SERPL-SCNC: 4 MMOL/L — SIGNIFICANT CHANGE UP (ref 3.5–5.3)
PROT SERPL-MCNC: 7.1 G/DL — SIGNIFICANT CHANGE UP (ref 6–8.3)
RBC # BLD: 4.89 M/UL — SIGNIFICANT CHANGE UP (ref 3.8–5.2)
RBC # FLD: 13.6 % — SIGNIFICANT CHANGE UP (ref 10.3–14.5)
SODIUM SERPL-SCNC: 145 MMOL/L — SIGNIFICANT CHANGE UP (ref 135–145)
TOTAL CHOLESTEROL/HDL RATIO MEASUREMENT: 2.1 RATIO — LOW (ref 3.3–7.1)
TRIGL SERPL-MCNC: 65 MG/DL — SIGNIFICANT CHANGE UP (ref 10–149)
URATE SERPL-MCNC: 4 MG/DL — SIGNIFICANT CHANGE UP (ref 2.5–7)
WBC # BLD: 4.85 K/UL — SIGNIFICANT CHANGE UP (ref 3.8–10.5)
WBC # FLD AUTO: 4.85 K/UL — SIGNIFICANT CHANGE UP (ref 3.8–10.5)

## 2019-12-09 PROCEDURE — 93005 ELECTROCARDIOGRAM TRACING: CPT

## 2019-12-09 PROCEDURE — 80053 COMPREHEN METABOLIC PANEL: CPT

## 2019-12-09 PROCEDURE — 84550 ASSAY OF BLOOD/URIC ACID: CPT

## 2019-12-09 PROCEDURE — 36415 COLL VENOUS BLD VENIPUNCTURE: CPT

## 2019-12-09 PROCEDURE — 93306 TTE W/DOPPLER COMPLETE: CPT | Mod: 26

## 2019-12-09 PROCEDURE — 93306 TTE W/DOPPLER COMPLETE: CPT

## 2019-12-09 PROCEDURE — 93010 ELECTROCARDIOGRAM REPORT: CPT

## 2019-12-09 PROCEDURE — 82977 ASSAY OF GGT: CPT

## 2019-12-09 PROCEDURE — 99214 OFFICE O/P EST MOD 30 MIN: CPT

## 2019-12-09 PROCEDURE — 85025 COMPLETE CBC W/AUTO DIFF WBC: CPT

## 2019-12-09 PROCEDURE — 80061 LIPID PANEL: CPT

## 2019-12-09 RX ORDER — PANTOPRAZOLE SODIUM 40 MG/1
40 TABLET, DELAYED RELEASE ORAL
Refills: 0 | Status: DISCONTINUED | COMMUNITY
End: 2019-12-09

## 2019-12-09 RX ORDER — DOCUSATE SODIUM 100 MG/1
CAPSULE ORAL
Refills: 0 | Status: DISCONTINUED | COMMUNITY
End: 2019-12-09

## 2019-12-10 VITALS
HEART RATE: 61 BPM | OXYGEN SATURATION: 98 % | DIASTOLIC BLOOD PRESSURE: 90 MMHG | SYSTOLIC BLOOD PRESSURE: 170 MMHG | RESPIRATION RATE: 14 BRPM

## 2019-12-11 NOTE — PHYSICAL EXAM
[Right Carotid Bruit] : no bruit heard over the right carotid [Left Carotid Bruit] : no bruit heard over the left carotid [Right Femoral Bruit] : no bruit heard over the right femoral artery [Left Femoral Bruit] : no bruit heard over the left femoral artery [FreeTextEntry1] : 1+ bilateral pedal edema

## 2019-12-11 NOTE — REVIEW OF SYSTEMS
[see HPI] : see HPI [Dyspnea on exertion] : dyspnea during exertion [SOB on Exertion] : shortness of breath during exertion [Palpitations] : no palpitations

## 2019-12-11 NOTE — HISTORY OF PRESENT ILLNESS
[FreeTextEntry1] : 89 year old female with a history of HTN, new atrial fibrillation (on Xarelto) and chronic diastolic heart failure with severe mitral regurgitation s/p trancatheter mitral valve repair using Saulo on 07/3/19 (Clasp IID research trial) presents for follow up. \par \par \par Since her last visit, the patient continues to feel well.  She denies chest pain, dizziness, syncope, orthopnea, PND, palpitations, or LE edema.  She denies shortness of breath at rest and complains of slight shortness of breath with exertion.

## 2020-07-26 ENCOUNTER — FORM ENCOUNTER (OUTPATIENT)
Age: 85
End: 2020-07-26

## 2020-07-27 ENCOUNTER — OUTPATIENT (OUTPATIENT)
Dept: OUTPATIENT SERVICES | Facility: HOSPITAL | Age: 85
LOS: 1 days | End: 2020-07-27
Payer: MEDICARE

## 2020-07-27 ENCOUNTER — APPOINTMENT (OUTPATIENT)
Dept: CARDIOTHORACIC SURGERY | Facility: CLINIC | Age: 85
End: 2020-07-27
Payer: MEDICARE

## 2020-07-27 VITALS
SYSTOLIC BLOOD PRESSURE: 188 MMHG | DIASTOLIC BLOOD PRESSURE: 108 MMHG | HEART RATE: 62 BPM | OXYGEN SATURATION: 97 % | HEIGHT: 66 IN | RESPIRATION RATE: 17 BRPM | BODY MASS INDEX: 20.09 KG/M2 | WEIGHT: 125 LBS | TEMPERATURE: 96.8 F

## 2020-07-27 DIAGNOSIS — Z00.6 ENCOUNTER FOR EXAMINATION FOR NORMAL COMPARISON AND CONTROL IN CLINICAL RESEARCH PROGRAM: ICD-10-CM

## 2020-07-27 DIAGNOSIS — Z98.890 OTHER SPECIFIED POSTPROCEDURAL STATES: Chronic | ICD-10-CM

## 2020-07-27 DIAGNOSIS — I34.0 NONRHEUMATIC MITRAL (VALVE) INSUFFICIENCY: ICD-10-CM

## 2020-07-27 DIAGNOSIS — Z90.710 ACQUIRED ABSENCE OF BOTH CERVIX AND UTERUS: Chronic | ICD-10-CM

## 2020-07-27 LAB
ALBUMIN SERPL ELPH-MCNC: 4.2 G/DL — SIGNIFICANT CHANGE UP (ref 3.3–5)
ALP SERPL-CCNC: 97 U/L — SIGNIFICANT CHANGE UP (ref 40–120)
ALT FLD-CCNC: 12 U/L — SIGNIFICANT CHANGE UP (ref 10–45)
ANION GAP SERPL CALC-SCNC: 7 MMOL/L — SIGNIFICANT CHANGE UP (ref 5–17)
APTT BLD: 41.3 SEC — HIGH (ref 27.5–35.5)
AST SERPL-CCNC: 25 U/L — SIGNIFICANT CHANGE UP (ref 10–40)
BASOPHILS # BLD AUTO: 0.03 K/UL — SIGNIFICANT CHANGE UP (ref 0–0.2)
BASOPHILS NFR BLD AUTO: 0.6 % — SIGNIFICANT CHANGE UP (ref 0–2)
BILIRUB SERPL-MCNC: 0.6 MG/DL — SIGNIFICANT CHANGE UP (ref 0.2–1.2)
BUN SERPL-MCNC: 15 MG/DL — SIGNIFICANT CHANGE UP (ref 7–23)
CALCIUM SERPL-MCNC: 9.5 MG/DL — SIGNIFICANT CHANGE UP (ref 8.4–10.5)
CHLORIDE SERPL-SCNC: 107 MMOL/L — SIGNIFICANT CHANGE UP (ref 96–108)
CHOLEST SERPL-MCNC: 174 MG/DL — SIGNIFICANT CHANGE UP (ref 10–199)
CK MB CFR SERPL CALC: 3.4 NG/ML — SIGNIFICANT CHANGE UP (ref 0–6.7)
CK SERPL-CCNC: 193 U/L — HIGH (ref 25–170)
CO2 SERPL-SCNC: 28 MMOL/L — SIGNIFICANT CHANGE UP (ref 22–31)
CREAT SERPL-MCNC: 0.83 MG/DL — SIGNIFICANT CHANGE UP (ref 0.5–1.3)
EOSINOPHIL # BLD AUTO: 0.06 K/UL — SIGNIFICANT CHANGE UP (ref 0–0.5)
EOSINOPHIL NFR BLD AUTO: 1.2 % — SIGNIFICANT CHANGE UP (ref 0–6)
GGT SERPL-CCNC: 24 U/L — SIGNIFICANT CHANGE UP (ref 8–40)
GLUCOSE SERPL-MCNC: 91 MG/DL — SIGNIFICANT CHANGE UP (ref 70–99)
HCT VFR BLD CALC: 43.7 % — SIGNIFICANT CHANGE UP (ref 34.5–45)
HDLC SERPL-MCNC: 76 MG/DL — SIGNIFICANT CHANGE UP
HGB BLD-MCNC: 14.3 G/DL — SIGNIFICANT CHANGE UP (ref 11.5–15.5)
IMM GRANULOCYTES NFR BLD AUTO: 0.2 % — SIGNIFICANT CHANGE UP (ref 0–1.5)
INR BLD: 1.33 — HIGH (ref 0.88–1.16)
LIPID PNL WITH DIRECT LDL SERPL: 82 MG/DL — SIGNIFICANT CHANGE UP
LYMPHOCYTES # BLD AUTO: 1.11 K/UL — SIGNIFICANT CHANGE UP (ref 1–3.3)
LYMPHOCYTES # BLD AUTO: 21.6 % — SIGNIFICANT CHANGE UP (ref 13–44)
MCHC RBC-ENTMCNC: 30.4 PG — SIGNIFICANT CHANGE UP (ref 27–34)
MCHC RBC-ENTMCNC: 32.7 GM/DL — SIGNIFICANT CHANGE UP (ref 32–36)
MCV RBC AUTO: 92.8 FL — SIGNIFICANT CHANGE UP (ref 80–100)
MONOCYTES # BLD AUTO: 0.22 K/UL — SIGNIFICANT CHANGE UP (ref 0–0.9)
MONOCYTES NFR BLD AUTO: 4.3 % — SIGNIFICANT CHANGE UP (ref 2–14)
NEUTROPHILS # BLD AUTO: 3.72 K/UL — SIGNIFICANT CHANGE UP (ref 1.8–7.4)
NEUTROPHILS NFR BLD AUTO: 72.1 % — SIGNIFICANT CHANGE UP (ref 43–77)
NRBC # BLD: 0 /100 WBCS — SIGNIFICANT CHANGE UP (ref 0–0)
PLATELET # BLD AUTO: 185 K/UL — SIGNIFICANT CHANGE UP (ref 150–400)
POTASSIUM SERPL-MCNC: 4 MMOL/L — SIGNIFICANT CHANGE UP (ref 3.5–5.3)
POTASSIUM SERPL-SCNC: 4 MMOL/L — SIGNIFICANT CHANGE UP (ref 3.5–5.3)
PROT SERPL-MCNC: 7.2 G/DL — SIGNIFICANT CHANGE UP (ref 6–8.3)
PROTHROM AB SERPL-ACNC: 15.7 SEC — HIGH (ref 10.6–13.6)
RBC # BLD: 4.71 M/UL — SIGNIFICANT CHANGE UP (ref 3.8–5.2)
RBC # FLD: 14 % — SIGNIFICANT CHANGE UP (ref 10.3–14.5)
SODIUM SERPL-SCNC: 142 MMOL/L — SIGNIFICANT CHANGE UP (ref 135–145)
TOTAL CHOLESTEROL/HDL RATIO MEASUREMENT: 2.3 RATIO — LOW (ref 3.3–7.1)
TRIGL SERPL-MCNC: 79 MG/DL — SIGNIFICANT CHANGE UP (ref 10–149)
URATE SERPL-MCNC: 4.9 MG/DL — SIGNIFICANT CHANGE UP (ref 2.5–7)
WBC # BLD: 5.15 K/UL — SIGNIFICANT CHANGE UP (ref 3.8–10.5)
WBC # FLD AUTO: 5.15 K/UL — SIGNIFICANT CHANGE UP (ref 3.8–10.5)

## 2020-07-27 PROCEDURE — 93005 ELECTROCARDIOGRAM TRACING: CPT

## 2020-07-27 PROCEDURE — 80053 COMPREHEN METABOLIC PANEL: CPT

## 2020-07-27 PROCEDURE — 36415 COLL VENOUS BLD VENIPUNCTURE: CPT

## 2020-07-27 PROCEDURE — 85730 THROMBOPLASTIN TIME PARTIAL: CPT

## 2020-07-27 PROCEDURE — 84550 ASSAY OF BLOOD/URIC ACID: CPT

## 2020-07-27 PROCEDURE — 85025 COMPLETE CBC W/AUTO DIFF WBC: CPT

## 2020-07-27 PROCEDURE — 99214 OFFICE O/P EST MOD 30 MIN: CPT

## 2020-07-27 PROCEDURE — 82977 ASSAY OF GGT: CPT

## 2020-07-27 PROCEDURE — 85610 PROTHROMBIN TIME: CPT

## 2020-07-27 PROCEDURE — 80061 LIPID PANEL: CPT

## 2020-07-27 PROCEDURE — 82553 CREATINE MB FRACTION: CPT

## 2020-07-27 PROCEDURE — 93010 ELECTROCARDIOGRAM REPORT: CPT

## 2020-07-27 PROCEDURE — 93306 TTE W/DOPPLER COMPLETE: CPT

## 2020-07-27 PROCEDURE — 93306 TTE W/DOPPLER COMPLETE: CPT | Mod: 26

## 2020-07-27 PROCEDURE — 82550 ASSAY OF CK (CPK): CPT

## 2020-07-28 NOTE — REVIEW OF SYSTEMS
[see HPI] : see HPI [Negative] : Psychiatric [SOB on Exertion] : no shortness of breath during exertion

## 2020-07-28 NOTE — HISTORY OF PRESENT ILLNESS
[FreeTextEntry1] : 89 year old female with a history of HTN, paroxysmal atrial fibrillation (on Xarelto) and chronic diastolic heart failure with severe mitral regurgitation s/p trancatheter mitral valve repair using Saulo on 07/3/19 (Clasp IID research trial) presents for one year follow up. \par \par Since her last visit, the patient continues to feel well without any complaints. She denies any SOB at rest or with exertion, chest pain, palpitations, dizziness, syncope, or LE edema. She remains active.  She states she sees her cardiologist, Dr. Bonilla weekly and her BP is always in the 130s.

## 2020-07-28 NOTE — PHYSICAL EXAM
[Normal Appearance] : normal appearance [Respiration, Rhythm And Depth] : normal respiratory rhythm and effort [Normal Jugular Venous V Waves Present] : normal jugular venous V waves present [] : no respiratory distress [Auscultation Breath Sounds / Voice Sounds] : lungs were clear to auscultation bilaterally [Exaggerated Use Of Accessory Muscles For Inspiration] : no accessory muscle use [Heart Sounds] : normal S1 and S2 [Abdomen Soft] : soft [Abdomen Tenderness] : non-tender [Abnormal Walk] : normal gait [Cyanosis, Localized] : no localized cyanosis [Oriented To Time, Place, And Person] : oriented to person, place, and time [Extraocular Movements] : extraocular movements were intact [Oropharynx] : the oropharynx was normal [Neck Cervical Mass (___cm)] : no neck mass was observed [Jugular Venous Distention Increased] : there was no jugular-venous distention [Thyroid Nodule] : there were no palpable thyroid nodules [Thyroid Diffuse Enlargement] : the thyroid was not enlarged [Chest Visual Inspection Thoracic Asymmetry] : no chest asymmetry [Diminished Respiratory Excursion] : normal chest expansion [Abdomen Mass (___ Cm)] : no abdominal mass palpated [No Focal Deficits] : no focal deficits [Heart Rate And Rhythm] : heart rate and rhythm were normal [Edema] : no peripheral edema present [FreeTextEntry1] : + systolic ejection murmur  [Left Carotid Bruit] : no bruit heard over the left carotid [Right Carotid Bruit] : no bruit heard over the right carotid [Right Femoral Bruit] : no bruit heard over the right femoral artery [Left Femoral Bruit] : no bruit heard over the left femoral artery

## 2020-10-12 ENCOUNTER — NON-APPOINTMENT (OUTPATIENT)
Age: 85
End: 2020-10-12

## 2020-10-20 ENCOUNTER — APPOINTMENT (OUTPATIENT)
Dept: HEART AND VASCULAR | Facility: CLINIC | Age: 85
End: 2020-10-20
Payer: MEDICARE

## 2020-10-20 ENCOUNTER — NON-APPOINTMENT (OUTPATIENT)
Age: 85
End: 2020-10-20

## 2020-10-20 VITALS
SYSTOLIC BLOOD PRESSURE: 147 MMHG | WEIGHT: 125 LBS | TEMPERATURE: 98.5 F | OXYGEN SATURATION: 99 % | DIASTOLIC BLOOD PRESSURE: 88 MMHG | HEART RATE: 68 BPM | BODY MASS INDEX: 20.09 KG/M2 | HEIGHT: 66 IN

## 2020-10-20 PROCEDURE — 93000 ELECTROCARDIOGRAM COMPLETE: CPT

## 2020-10-20 PROCEDURE — 99203 OFFICE O/P NEW LOW 30 MIN: CPT | Mod: 25

## 2020-10-20 NOTE — HISTORY OF PRESENT ILLNESS
[FreeTextEntry1] : 89 year old female with a history of HTN, paroxysmal atrial fibrillation (on reduced-dose Eliquis) and chronic diastolic heart failure with severe mitral regurgitation s/p trancatheter mitral valve repair using Saulo on 07/3/19 ([facility]) presents for an initial evaluation for Atrial fibrillation. \par \par She was sent by Dr. Bonilla after a 24H holter showed persistent AF with some RVR. \par \par She's been feeling well. Denies palpitations. She's very active and shops daily and walks everywhere. She complains of some fatigue with excessive walking, but overall she remains very active and feeling well. Denies palpitations, lightheadedness, and syncope.

## 2020-10-20 NOTE — PHYSICAL EXAM
[Normal Appearance] : normal appearance [Heart Rate And Rhythm] : heart rate and rhythm were normal [Normal Jugular Venous V Waves Present] : normal jugular venous V waves present [Heart Sounds] : normal S1 and S2 [Edema] : no peripheral edema present [] : no respiratory distress [Respiration, Rhythm And Depth] : normal respiratory rhythm and effort [Exaggerated Use Of Accessory Muscles For Inspiration] : no accessory muscle use [Auscultation Breath Sounds / Voice Sounds] : lungs were clear to auscultation bilaterally [Abdomen Soft] : soft [Abnormal Walk] : normal gait [Abdomen Tenderness] : non-tender [Cyanosis, Localized] : no localized cyanosis [Oriented To Time, Place, And Person] : oriented to person, place, and time [Extraocular Movements] : extraocular movements were intact [Oropharynx] : the oropharynx was normal [Neck Cervical Mass (___cm)] : no neck mass was observed [Jugular Venous Distention Increased] : there was no jugular-venous distention [Thyroid Diffuse Enlargement] : the thyroid was not enlarged [Thyroid Nodule] : there were no palpable thyroid nodules [Chest Visual Inspection Thoracic Asymmetry] : no chest asymmetry [Diminished Respiratory Excursion] : normal chest expansion [Abdomen Mass (___ Cm)] : no abdominal mass palpated [No Focal Deficits] : no focal deficits [FreeTextEntry1] : + systolic ejection murmur  [Right Carotid Bruit] : no bruit heard over the right carotid [Left Carotid Bruit] : no bruit heard over the left carotid [Right Femoral Bruit] : no bruit heard over the right femoral artery [Left Femoral Bruit] : no bruit heard over the left femoral artery

## 2021-06-27 ENCOUNTER — FORM ENCOUNTER (OUTPATIENT)
Age: 86
End: 2021-06-27

## 2021-06-28 ENCOUNTER — OUTPATIENT (OUTPATIENT)
Dept: OUTPATIENT SERVICES | Facility: HOSPITAL | Age: 86
LOS: 1 days | End: 2021-06-28
Payer: SUBSIDIZED

## 2021-06-28 ENCOUNTER — APPOINTMENT (OUTPATIENT)
Dept: CARDIOTHORACIC SURGERY | Facility: CLINIC | Age: 86
End: 2021-06-28
Payer: MEDICARE

## 2021-06-28 DIAGNOSIS — I34.0 NONRHEUMATIC MITRAL (VALVE) INSUFFICIENCY: ICD-10-CM

## 2021-06-28 DIAGNOSIS — Z98.890 OTHER SPECIFIED POSTPROCEDURAL STATES: Chronic | ICD-10-CM

## 2021-06-28 DIAGNOSIS — Z90.710 ACQUIRED ABSENCE OF BOTH CERVIX AND UTERUS: Chronic | ICD-10-CM

## 2021-06-28 DIAGNOSIS — Z00.6 ENCOUNTER FOR EXAMINATION FOR NORMAL COMPARISON AND CONTROL IN CLINICAL RESEARCH PROGRAM: ICD-10-CM

## 2021-06-28 PROCEDURE — 99213 OFFICE O/P EST LOW 20 MIN: CPT

## 2021-06-28 PROCEDURE — 93306 TTE W/DOPPLER COMPLETE: CPT | Mod: 26

## 2021-06-28 PROCEDURE — 93306 TTE W/DOPPLER COMPLETE: CPT

## 2021-06-30 VITALS
HEART RATE: 58 BPM | RESPIRATION RATE: 18 BRPM | DIASTOLIC BLOOD PRESSURE: 73 MMHG | OXYGEN SATURATION: 98 % | SYSTOLIC BLOOD PRESSURE: 134 MMHG

## 2021-07-01 VITALS — BODY MASS INDEX: 20.09 KG/M2 | HEIGHT: 67 IN | TEMPERATURE: 96.2 F | WEIGHT: 128 LBS

## 2021-07-02 NOTE — HISTORY OF PRESENT ILLNESS
[FreeTextEntry1] : 89 year old female with a history of HTN, paroxysmal atrial fibrillation (on Xarelto) and chronic diastolic heart failure with severe mitral regurgitation s/p transcatheter mitral valve repair using Saulo on 07/3/19 (Cl[employer] IID research trial) presents for follow up. \par \par Since her last visit,  the patient continues to feel well with no complaints.  She denies SOB at rest or with exertion, chest pain, palpitations, dizziness, syncope, or LE edema.

## 2021-07-02 NOTE — PHYSICAL EXAM
[FreeTextEntry1] : + systolic ejection murmur  [Right Carotid Bruit] : no bruit heard over the right carotid [Left Carotid Bruit] : no bruit heard over the left carotid [Right Femoral Bruit] : no bruit heard over the right femoral artery [Left Femoral Bruit] : no bruit heard over the left femoral artery

## 2022-05-20 ENCOUNTER — APPOINTMENT (OUTPATIENT)
Dept: HEART AND VASCULAR | Facility: CLINIC | Age: 87
End: 2022-05-20
Payer: MEDICARE

## 2022-05-20 VITALS
TEMPERATURE: 98.1 F | HEART RATE: 72 BPM | OXYGEN SATURATION: 98 % | HEIGHT: 67 IN | WEIGHT: 126.99 LBS | SYSTOLIC BLOOD PRESSURE: 150 MMHG | DIASTOLIC BLOOD PRESSURE: 94 MMHG | BODY MASS INDEX: 19.93 KG/M2

## 2022-05-20 PROCEDURE — 36415 COLL VENOUS BLD VENIPUNCTURE: CPT

## 2022-05-20 PROCEDURE — 99204 OFFICE O/P NEW MOD 45 MIN: CPT

## 2022-05-20 PROCEDURE — 93000 ELECTROCARDIOGRAM COMPLETE: CPT

## 2022-05-20 NOTE — HISTORY OF PRESENT ILLNESS
[FreeTextEntry1] : 89 F HTN PAF HFpEF Severe MR s/p Cookie 7/3/2019 \par \par here to establish care referred by Dr. Waldron she walks and feels well she has no complaints \par \par \par \par ecg nsr rbbb pvc's 1 avb 5/20/2022\par \par \par echo 6/2021 LVEF 2 cookie devices mean 3 mmHg severe anteriorly directed MR MIldly dilated right ventricle

## 2022-05-20 NOTE — ASSESSMENT
[FreeTextEntry1] : - HTN acceptable for her age\par - afib on eliquis\par - cookie mitral valve can stop aspirin\par - fu in three months \par -check labs

## 2022-05-20 NOTE — PHYSICAL EXAM

## 2022-05-22 LAB
ALBUMIN SERPL ELPH-MCNC: 4.3 G/DL
ALP BLD-CCNC: 132 U/L
ALT SERPL-CCNC: 18 U/L
ANION GAP SERPL CALC-SCNC: 14 MMOL/L
AST SERPL-CCNC: 32 U/L
BASOPHILS # BLD AUTO: 0.04 K/UL
BASOPHILS NFR BLD AUTO: 0.7 %
BILIRUB SERPL-MCNC: 0.6 MG/DL
BUN SERPL-MCNC: 15 MG/DL
CALCIUM SERPL-MCNC: 9.5 MG/DL
CHLORIDE SERPL-SCNC: 107 MMOL/L
CHOLEST SERPL-MCNC: 159 MG/DL
CO2 SERPL-SCNC: 24 MMOL/L
CREAT SERPL-MCNC: 0.79 MG/DL
EGFR: 69 ML/MIN/1.73M2
EOSINOPHIL # BLD AUTO: 0.13 K/UL
EOSINOPHIL NFR BLD AUTO: 2.4 %
ESTIMATED AVERAGE GLUCOSE: 111 MG/DL
GLUCOSE SERPL-MCNC: 77 MG/DL
HBA1C MFR BLD HPLC: 5.5 %
HCT VFR BLD CALC: 44.9 %
HDLC SERPL-MCNC: 75 MG/DL
HGB BLD-MCNC: 14.1 G/DL
IMM GRANULOCYTES NFR BLD AUTO: 0.2 %
LDLC SERPL CALC-MCNC: 72 MG/DL
LYMPHOCYTES # BLD AUTO: 1.33 K/UL
LYMPHOCYTES NFR BLD AUTO: 24.9 %
MAN DIFF?: NORMAL
MCHC RBC-ENTMCNC: 30.5 PG
MCHC RBC-ENTMCNC: 31.4 GM/DL
MCV RBC AUTO: 97.2 FL
MONOCYTES # BLD AUTO: 0.36 K/UL
MONOCYTES NFR BLD AUTO: 6.7 %
NEUTROPHILS # BLD AUTO: 3.47 K/UL
NEUTROPHILS NFR BLD AUTO: 65.1 %
NONHDLC SERPL-MCNC: 84 MG/DL
NT-PROBNP SERPL-MCNC: 667 PG/ML
PLATELET # BLD AUTO: 163 K/UL
POTASSIUM SERPL-SCNC: 4.7 MMOL/L
PROT SERPL-MCNC: 7 G/DL
RBC # BLD: 4.62 M/UL
RBC # FLD: 14.3 %
SODIUM SERPL-SCNC: 146 MMOL/L
TRIGL SERPL-MCNC: 59 MG/DL
WBC # FLD AUTO: 5.34 K/UL

## 2022-06-05 ENCOUNTER — FORM ENCOUNTER (OUTPATIENT)
Age: 87
End: 2022-06-05

## 2022-06-06 ENCOUNTER — OUTPATIENT (OUTPATIENT)
Dept: OUTPATIENT SERVICES | Facility: HOSPITAL | Age: 87
LOS: 1 days | End: 2022-06-06
Payer: SUBSIDIZED

## 2022-06-06 ENCOUNTER — NON-APPOINTMENT (OUTPATIENT)
Age: 87
End: 2022-06-06

## 2022-06-06 ENCOUNTER — APPOINTMENT (OUTPATIENT)
Dept: CARDIOTHORACIC SURGERY | Facility: CLINIC | Age: 87
End: 2022-06-06
Payer: SUBSIDIZED

## 2022-06-06 VITALS
SYSTOLIC BLOOD PRESSURE: 163 MMHG | TEMPERATURE: 96.9 F | RESPIRATION RATE: 18 BRPM | BODY MASS INDEX: 19.78 KG/M2 | WEIGHT: 126 LBS | HEART RATE: 69 BPM | HEIGHT: 67 IN | DIASTOLIC BLOOD PRESSURE: 104 MMHG | OXYGEN SATURATION: 97 %

## 2022-06-06 DIAGNOSIS — I35.1 NONRHEUMATIC AORTIC (VALVE) INSUFFICIENCY: ICD-10-CM

## 2022-06-06 DIAGNOSIS — Z98.890 OTHER SPECIFIED POSTPROCEDURAL STATES: Chronic | ICD-10-CM

## 2022-06-06 DIAGNOSIS — Z90.710 ACQUIRED ABSENCE OF BOTH CERVIX AND UTERUS: Chronic | ICD-10-CM

## 2022-06-06 PROCEDURE — 99214 OFFICE O/P EST MOD 30 MIN: CPT

## 2022-06-06 PROCEDURE — 93306 TTE W/DOPPLER COMPLETE: CPT

## 2022-06-06 PROCEDURE — 93306 TTE W/DOPPLER COMPLETE: CPT | Mod: 26

## 2022-06-06 PROCEDURE — 99072 ADDL SUPL MATRL&STAF TM PHE: CPT

## 2022-06-07 PROBLEM — I35.1 AORTIC VALVE INSUFFICIENCY, ETIOLOGY OF CARDIAC VALVE DISEASE UNSPECIFIED: Status: ACTIVE | Noted: 2019-05-08

## 2022-06-07 RX ORDER — BIMATOPROST 0.1 MG/ML
0.01 SOLUTION/ DROPS OPHTHALMIC DAILY
Refills: 0 | Status: DISCONTINUED | COMMUNITY
End: 2022-06-07

## 2022-06-12 NOTE — HISTORY OF PRESENT ILLNESS
[FreeTextEntry1] : 89 year old female with a history of HTN, paroxysmal atrial fibrillation (on Xarelto) and chronic diastolic heart failure with severe mitral regurgitation s/p transcatheter mitral valve repair using Saulo on 07/3/19 (Clasp IID research trial) presents for follow up per research protocol. \par \par Since her last visit,  the patient she has been feeling well.  Her biggest complaint is her arthritis.  She states she feels lightheaded and short of breath at times but the episodes are very short.  She denies chest pain, shortness of breath at rest, dizziness, syncope, orthopnea, PND, or LE edema.

## 2022-06-12 NOTE — PHYSICAL EXAM
[Normal Appearance] : normal appearance [Normal Jugular Venous V Waves Present] : normal jugular venous V waves present [] : no respiratory distress [Respiration, Rhythm And Depth] : normal respiratory rhythm and effort [Exaggerated Use Of Accessory Muscles For Inspiration] : no accessory muscle use [Auscultation Breath Sounds / Voice Sounds] : lungs were clear to auscultation bilaterally [Heart Rate And Rhythm] : heart rate and rhythm were normal [Heart Sounds] : normal S1 and S2 [Edema] : no peripheral edema present [Abnormal Walk] : normal gait [Cyanosis, Localized] : no localized cyanosis [Oriented To Time, Place, And Person] : oriented to person, place, and time [Extraocular Movements] : extraocular movements were intact [Neck Cervical Mass (___cm)] : no neck mass was observed [Jugular Venous Distention Increased] : there was no jugular-venous distention [Chest Visual Inspection Thoracic Asymmetry] : no chest asymmetry [Diminished Respiratory Excursion] : normal chest expansion [Abdomen Mass (___ Cm)] : no abdominal mass palpated [No Focal Deficits] : no focal deficits [Neck Appearance] : the appearance of the neck was normal [Abdomen Soft] : soft [Abdomen Tenderness] : non-tender [FreeTextEntry1] : + systolic ejection murmur  [Right Carotid Bruit] : no bruit heard over the right carotid [Left Carotid Bruit] : no bruit heard over the left carotid [Right Femoral Bruit] : no bruit heard over the right femoral artery [Left Femoral Bruit] : no bruit heard over the left femoral artery

## 2022-06-12 NOTE — REVIEW OF SYSTEMS
[Dyspnea on exertion] : dyspnea during exertion [Negative] : Heme/Lymph [FreeTextEntry9] : arthritis pain  [de-identified] : lightheadedness  [SOB on Exertion] : no shortness of breath during exertion

## 2022-06-22 ENCOUNTER — APPOINTMENT (OUTPATIENT)
Dept: INTERNAL MEDICINE | Facility: CLINIC | Age: 87
End: 2022-06-22
Payer: MEDICARE

## 2022-06-22 VITALS
HEIGHT: 67 IN | OXYGEN SATURATION: 100 % | SYSTOLIC BLOOD PRESSURE: 158 MMHG | BODY MASS INDEX: 19.62 KG/M2 | HEART RATE: 79 BPM | WEIGHT: 125 LBS | DIASTOLIC BLOOD PRESSURE: 98 MMHG | TEMPERATURE: 97.3 F

## 2022-06-22 DIAGNOSIS — Z80.7 FAMILY HISTORY OF OTHER MALIGNANT NEOPLASMS OF LYMPHOID, HEMATOPOIETIC AND RELATED TISSUES: ICD-10-CM

## 2022-06-22 DIAGNOSIS — Z80.42 FAMILY HISTORY OF MALIGNANT NEOPLASM OF PROSTATE: ICD-10-CM

## 2022-06-22 DIAGNOSIS — Z80.0 FAMILY HISTORY OF MALIGNANT NEOPLASM OF DIGESTIVE ORGANS: ICD-10-CM

## 2022-06-22 DIAGNOSIS — H40.9 UNSPECIFIED GLAUCOMA: ICD-10-CM

## 2022-06-22 PROCEDURE — G0439: CPT

## 2022-06-22 NOTE — HEALTH RISK ASSESSMENT
[Never] : Never [Yes] : Yes [Monthly or less (1 pt)] : Monthly or less (1 point) [1 or 2 (0 pts)] : 1 or 2 (0 points) [Never (0 pts)] : Never (0 points) [No] : In the past 12 months have you used drugs other than those required for medical reasons? No [0] : 1) Little interest or pleasure doing things: Not at all (0) [1] : 2) Feeling down, depressed, or hopeless for several days (1) [PHQ-2 Negative - No further assessment needed] : PHQ-2 Negative - No further assessment needed [Alone] : lives alone [Retired] : retired [Fully functional (bathing, dressing, toileting, transferring, walking, feeding)] : Fully functional (bathing, dressing, toileting, transferring, walking, feeding) [Fully functional (using the telephone, shopping, preparing meals, housekeeping, doing laundry, using] : Fully functional and needs no help or supervision to perform IADLs (using the telephone, shopping, preparing meals, housekeeping, doing laundry, using transportation, managing medications and managing finances) [Reports changes in hearing] : Reports changes in hearing [With Patient/Caregiver] : , with patient/caregiver [Audit-CScore] : 1 [de-identified] : walking regularly  [de-identified] : healthy diet  [[id number]] : 1 [Reports changes in vision] : Reports no changes in vision [Reports changes in dental health] : Reports no changes in dental health [AdvancecareDate] : 06/22 [FreeTextEntry4] : HCP recently passed away. Plans to have her friend Ela become her HCP. Has never completed MOLST form. Declines completing MOLST form today.

## 2022-06-22 NOTE — HISTORY OF PRESENT ILLNESS
[de-identified] : Ms. SUE BLUNT is a 89 year old female with history of  HTN, paroxysmal atrial fibrillation (on Eliquis) and chronic diastolic heart failure with severe mitral regurgitation s/p transcatheter mitral valve repair '19 who presents today to establish care. She expresses concern about lower back pain when bending over that limit her mobility. Tylenol has not been helping, but heat provides some improvement.

## 2022-06-22 NOTE — ASSESSMENT
[FreeTextEntry1] : #HCM\par - Recieved Moderna COVID-19 vaccine and first booster; recommend second booster\par - Discussed GOC given her significant cardiac disease. She currently lives alone and has no HCP or MOLST. Pt declines discussion of her wishes stating she will "figure out when I need to." Discussed HHA given her back issues, but she prefers to remain independent \par - Discussed checking labs given elevated Sodium and ALP last month, DEXA scan and vaccinations, but she declines

## 2022-06-22 NOTE — PHYSICAL EXAM
[Irregularly Irregular] : irregularly irregular [No Spinal Tenderness] : no spinal tenderness [Normal] : no rash [de-identified] : systolic and diastolic murmur with fixed splitting  [de-identified] : pitting ankle edema

## 2022-06-22 NOTE — REVIEW OF SYSTEMS
[Joint Pain] : joint pain [Joint Stiffness] : joint stiffness [Back Pain] : back pain [Negative] : Psychiatric [Joint Swelling] : no joint swelling [Muscle Weakness] : no muscle weakness [Muscle Pain] : no muscle pain

## 2022-09-08 ENCOUNTER — APPOINTMENT (OUTPATIENT)
Dept: INTERNAL MEDICINE | Facility: CLINIC | Age: 87
End: 2022-09-08
Payer: MEDICARE

## 2022-09-08 VITALS
OXYGEN SATURATION: 99 % | HEIGHT: 67 IN | DIASTOLIC BLOOD PRESSURE: 89 MMHG | SYSTOLIC BLOOD PRESSURE: 145 MMHG | WEIGHT: 124 LBS | TEMPERATURE: 97 F | BODY MASS INDEX: 19.46 KG/M2 | HEART RATE: 64 BPM

## 2022-09-08 PROCEDURE — 99387 INIT PM E/M NEW PAT 65+ YRS: CPT | Mod: 25,GY

## 2022-09-08 PROCEDURE — 99203 OFFICE O/P NEW LOW 30 MIN: CPT | Mod: GC,25

## 2022-09-08 RX ORDER — DICLOFENAC SODIUM 10 MG/G
1 GEL TOPICAL DAILY
Qty: 1 | Refills: 0 | Status: COMPLETED | COMMUNITY
Start: 2022-09-08

## 2022-09-22 ENCOUNTER — NON-APPOINTMENT (OUTPATIENT)
Age: 87
End: 2022-09-22

## 2022-09-22 ENCOUNTER — APPOINTMENT (OUTPATIENT)
Dept: HEART AND VASCULAR | Facility: CLINIC | Age: 87
End: 2022-09-22
Payer: MEDICARE

## 2022-09-22 VITALS
BODY MASS INDEX: 19.46 KG/M2 | TEMPERATURE: 97.7 F | OXYGEN SATURATION: 99 % | HEIGHT: 67 IN | SYSTOLIC BLOOD PRESSURE: 137 MMHG | HEART RATE: 68 BPM | WEIGHT: 123.99 LBS | DIASTOLIC BLOOD PRESSURE: 85 MMHG

## 2022-09-22 PROCEDURE — 93000 ELECTROCARDIOGRAM COMPLETE: CPT

## 2022-09-22 PROCEDURE — 99214 OFFICE O/P EST MOD 30 MIN: CPT

## 2022-09-22 RX ORDER — ERGOCALCIFEROL 1.25 MG/1
1.25 MG CAPSULE, LIQUID FILLED ORAL
Refills: 0 | Status: ACTIVE | COMMUNITY
Start: 2021-09-29

## 2022-09-22 RX ORDER — DICLOFENAC SODIUM 1% 10 MG/G
1 GEL TOPICAL
Qty: 1 | Refills: 5 | Status: ACTIVE | COMMUNITY
Start: 2022-09-22 | End: 1900-01-01

## 2022-09-22 NOTE — ASSESSMENT
[FreeTextEntry1] : - HTN acceptable for her age\par - afib on eliquis\par - cookie mitral valve can stop aspirin\par - pain will use topical creams for pain relief arnicare or voltaren\par - fu in three months \par

## 2022-09-22 NOTE — HISTORY OF PRESENT ILLNESS
[FreeTextEntry1] : 89 F HTN PAF HFpEF Severe MR s/p Cookie 7/3/2019 \par \par here for fu she feels well she has back pain and bilateral foot pain with burning pain in the soles of her feet\par \par \par \par ecg nsr rbbb 9/22/2022\par \par \par echo 6/2021 LVEF 2 cookie devices mean 3 mmHg severe anteriorly directed MR MIldly dilated right ventricle

## 2022-09-22 NOTE — PHYSICAL EXAM

## 2022-12-22 ENCOUNTER — APPOINTMENT (OUTPATIENT)
Dept: HEART AND VASCULAR | Facility: CLINIC | Age: 87
End: 2022-12-22

## 2022-12-22 VITALS
HEART RATE: 69 BPM | TEMPERATURE: 98.5 F | WEIGHT: 125 LBS | HEIGHT: 67 IN | OXYGEN SATURATION: 98 % | SYSTOLIC BLOOD PRESSURE: 120 MMHG | DIASTOLIC BLOOD PRESSURE: 80 MMHG | BODY MASS INDEX: 19.62 KG/M2

## 2022-12-22 DIAGNOSIS — Z23 ENCOUNTER FOR IMMUNIZATION: ICD-10-CM

## 2022-12-22 PROCEDURE — 36415 COLL VENOUS BLD VENIPUNCTURE: CPT

## 2022-12-22 PROCEDURE — 90662 IIV NO PRSV INCREASED AG IM: CPT

## 2022-12-22 PROCEDURE — 99214 OFFICE O/P EST MOD 30 MIN: CPT | Mod: 25

## 2022-12-22 PROCEDURE — G0008: CPT

## 2022-12-22 PROCEDURE — 93000 ELECTROCARDIOGRAM COMPLETE: CPT

## 2022-12-22 NOTE — END OF VISIT
[Time Spent: ___ minutes] : I have spent [unfilled] minutes of time on the encounter. Itraconazole Pregnancy And Lactation Text: This medication is Pregnancy Category C and it isn't know if it is safe during pregnancy. It is also excreted in breast milk.

## 2022-12-23 ENCOUNTER — APPOINTMENT (OUTPATIENT)
Dept: AFTER HOURS CARE | Facility: EMERGENCY ROOM | Age: 87
End: 2022-12-23
Payer: MEDICARE

## 2022-12-23 LAB
ALBUMIN SERPL ELPH-MCNC: 4.4 G/DL
ALP BLD-CCNC: 105 U/L
ALT SERPL-CCNC: 21 U/L
ANION GAP SERPL CALC-SCNC: 10 MMOL/L
AST SERPL-CCNC: 37 U/L
BASOPHILS # BLD AUTO: 0.04 K/UL
BASOPHILS NFR BLD AUTO: 0.8 %
BILIRUB SERPL-MCNC: 0.6 MG/DL
BUN SERPL-MCNC: 13 MG/DL
CALCIUM SERPL-MCNC: 9.8 MG/DL
CHLORIDE SERPL-SCNC: 107 MMOL/L
CO2 SERPL-SCNC: 26 MMOL/L
CREAT SERPL-MCNC: 0.84 MG/DL
EGFR: 64 ML/MIN/1.73M2
EOSINOPHIL # BLD AUTO: 0.17 K/UL
EOSINOPHIL NFR BLD AUTO: 3.3 %
ESTIMATED AVERAGE GLUCOSE: 111 MG/DL
GLUCOSE SERPL-MCNC: 79 MG/DL
HBA1C MFR BLD HPLC: 5.5 %
HCT VFR BLD CALC: 42.7 %
HGB BLD-MCNC: 14 G/DL
IMM GRANULOCYTES NFR BLD AUTO: 0.2 %
LYMPHOCYTES # BLD AUTO: 0.85 K/UL
LYMPHOCYTES NFR BLD AUTO: 16.6 %
MAN DIFF?: NORMAL
MCHC RBC-ENTMCNC: 30.6 PG
MCHC RBC-ENTMCNC: 32.8 GM/DL
MCV RBC AUTO: 93.2 FL
MONOCYTES # BLD AUTO: 0.32 K/UL
MONOCYTES NFR BLD AUTO: 6.2 %
NEUTROPHILS # BLD AUTO: 3.74 K/UL
NEUTROPHILS NFR BLD AUTO: 72.9 %
NT-PROBNP SERPL-MCNC: 933 PG/ML
PLATELET # BLD AUTO: 181 K/UL
POTASSIUM SERPL-SCNC: 4.4 MMOL/L
PROT SERPL-MCNC: 6.9 G/DL
RBC # BLD: 4.58 M/UL
RBC # FLD: 14.4 %
SODIUM SERPL-SCNC: 142 MMOL/L
WBC # FLD AUTO: 5.13 K/UL

## 2022-12-23 PROCEDURE — 99204 OFFICE O/P NEW MOD 45 MIN: CPT | Mod: CS,95

## 2022-12-25 NOTE — PHYSICAL EXAM

## 2022-12-25 NOTE — HISTORY OF PRESENT ILLNESS
[FreeTextEntry1] : 89 F HTN PAF HFpEF Severe MR s/p Cookie 7/3/2019 Severe residual MR Severe TR\par \par \par she is dizzy feels like she is going to fall she is off balanced\par \par \par \par ecg nsr rbbb 9/22/2022\par \par \par echo 6/2022 LVEF 2 cookie devices mean 3 mmHg severe anteriorly directed MR severe TR MIldly dilated right ventricle

## 2022-12-25 NOTE — ASSESSMENT
[FreeTextEntry1] : - HTN acceptable for her age\par - afib on eliquis\par - cookie mitral valve can stop aspirin she is on eliquis\par - poor balance she is not orthostatic vestibular therapy check labs \par - pain will use topical creams for pain relief arnicare or voltaren\par - fu in three months \par

## 2023-01-01 ENCOUNTER — NON-APPOINTMENT (OUTPATIENT)
Age: 88
End: 2023-01-01

## 2023-01-01 ENCOUNTER — APPOINTMENT (OUTPATIENT)
Dept: INTERNAL MEDICINE | Facility: CLINIC | Age: 88
End: 2023-01-01
Payer: MEDICARE

## 2023-01-01 ENCOUNTER — APPOINTMENT (OUTPATIENT)
Dept: HEART AND VASCULAR | Facility: CLINIC | Age: 88
End: 2023-01-01
Payer: MEDICARE

## 2023-01-01 ENCOUNTER — APPOINTMENT (OUTPATIENT)
Dept: CARDIOTHORACIC SURGERY | Facility: CLINIC | Age: 88
End: 2023-01-01
Payer: MEDICARE

## 2023-01-01 ENCOUNTER — APPOINTMENT (OUTPATIENT)
Dept: HEART AND VASCULAR | Facility: CLINIC | Age: 88
End: 2023-01-01

## 2023-01-01 ENCOUNTER — FORM ENCOUNTER (OUTPATIENT)
Age: 88
End: 2023-01-01

## 2023-01-01 ENCOUNTER — RX RENEWAL (OUTPATIENT)
Age: 88
End: 2023-01-01

## 2023-01-01 ENCOUNTER — OUTPATIENT (OUTPATIENT)
Dept: OUTPATIENT SERVICES | Facility: HOSPITAL | Age: 88
LOS: 1 days | End: 2023-01-01
Payer: SUBSIDIZED

## 2023-01-01 ENCOUNTER — APPOINTMENT (OUTPATIENT)
Dept: INTERNAL MEDICINE | Facility: CLINIC | Age: 88
End: 2023-01-01

## 2023-01-01 VITALS
HEIGHT: 67 IN | BODY MASS INDEX: 18.67 KG/M2 | DIASTOLIC BLOOD PRESSURE: 80 MMHG | OXYGEN SATURATION: 99 % | HEART RATE: 90 BPM | WEIGHT: 118.98 LBS | TEMPERATURE: 97.7 F | SYSTOLIC BLOOD PRESSURE: 120 MMHG

## 2023-01-01 VITALS
HEIGHT: 67 IN | BODY MASS INDEX: 18.99 KG/M2 | OXYGEN SATURATION: 98 % | WEIGHT: 120.99 LBS | DIASTOLIC BLOOD PRESSURE: 86 MMHG | HEART RATE: 76 BPM | TEMPERATURE: 97.9 F | SYSTOLIC BLOOD PRESSURE: 116 MMHG

## 2023-01-01 VITALS
WEIGHT: 112 LBS | DIASTOLIC BLOOD PRESSURE: 79 MMHG | HEART RATE: 66 BPM | OXYGEN SATURATION: 98 % | TEMPERATURE: 97.9 F | RESPIRATION RATE: 17 BRPM | SYSTOLIC BLOOD PRESSURE: 122 MMHG | BODY MASS INDEX: 17.58 KG/M2 | HEIGHT: 67 IN

## 2023-01-01 VITALS
HEIGHT: 65 IN | HEART RATE: 58 BPM | DIASTOLIC BLOOD PRESSURE: 78 MMHG | BODY MASS INDEX: 18.99 KG/M2 | TEMPERATURE: 97 F | SYSTOLIC BLOOD PRESSURE: 119 MMHG | OXYGEN SATURATION: 98 % | WEIGHT: 114 LBS

## 2023-01-01 VITALS
BODY MASS INDEX: 18.99 KG/M2 | HEIGHT: 67 IN | OXYGEN SATURATION: 99 % | WEIGHT: 121 LBS | SYSTOLIC BLOOD PRESSURE: 118 MMHG | TEMPERATURE: 97.2 F | HEART RATE: 63 BPM | DIASTOLIC BLOOD PRESSURE: 93 MMHG

## 2023-01-01 VITALS
OXYGEN SATURATION: 99 % | WEIGHT: 121 LBS | RESPIRATION RATE: 18 BRPM | HEIGHT: 67 IN | SYSTOLIC BLOOD PRESSURE: 115 MMHG | HEART RATE: 70 BPM | BODY MASS INDEX: 18.99 KG/M2 | TEMPERATURE: 97.7 F | DIASTOLIC BLOOD PRESSURE: 79 MMHG

## 2023-01-01 VITALS
SYSTOLIC BLOOD PRESSURE: 110 MMHG | HEIGHT: 67 IN | DIASTOLIC BLOOD PRESSURE: 70 MMHG | HEART RATE: 65 BPM | WEIGHT: 120 LBS | OXYGEN SATURATION: 98 % | BODY MASS INDEX: 18.83 KG/M2

## 2023-01-01 VITALS
DIASTOLIC BLOOD PRESSURE: 75 MMHG | BODY MASS INDEX: 18.99 KG/M2 | HEIGHT: 65 IN | WEIGHT: 114 LBS | OXYGEN SATURATION: 98 % | HEART RATE: 65 BPM | SYSTOLIC BLOOD PRESSURE: 115 MMHG | TEMPERATURE: 98.1 F

## 2023-01-01 VITALS
DIASTOLIC BLOOD PRESSURE: 78 MMHG | SYSTOLIC BLOOD PRESSURE: 124 MMHG | TEMPERATURE: 99 F | HEART RATE: 65 BPM | HEIGHT: 67 IN | OXYGEN SATURATION: 98 % | WEIGHT: 112 LBS | BODY MASS INDEX: 17.58 KG/M2

## 2023-01-01 DIAGNOSIS — R09.82 POSTNASAL DRIP: ICD-10-CM

## 2023-01-01 DIAGNOSIS — I48.91 UNSPECIFIED ATRIAL FIBRILLATION: ICD-10-CM

## 2023-01-01 DIAGNOSIS — I50.32 CHRONIC DIASTOLIC (CONGESTIVE) HEART FAILURE: ICD-10-CM

## 2023-01-01 DIAGNOSIS — G89.29 LOW BACK PAIN, UNSPECIFIED: ICD-10-CM

## 2023-01-01 DIAGNOSIS — K06.9 PERIODONTAL DISEASE, UNSPECIFIED: ICD-10-CM

## 2023-01-01 DIAGNOSIS — Z00.6 ENCOUNTER FOR EXAMINATION FOR NORMAL COMPARISON AND CONTROL IN CLINICAL RESEARCH PROGRAM: ICD-10-CM

## 2023-01-01 DIAGNOSIS — I10 ESSENTIAL (PRIMARY) HYPERTENSION: ICD-10-CM

## 2023-01-01 DIAGNOSIS — M54.50 LOW BACK PAIN, UNSPECIFIED: ICD-10-CM

## 2023-01-01 DIAGNOSIS — Z23 ENCOUNTER FOR IMMUNIZATION: ICD-10-CM

## 2023-01-01 DIAGNOSIS — I34.0 NONRHEUMATIC MITRAL (VALVE) INSUFFICIENCY: ICD-10-CM

## 2023-01-01 DIAGNOSIS — Z98.890 OTHER SPECIFIED POSTPROCEDURAL STATES: Chronic | ICD-10-CM

## 2023-01-01 DIAGNOSIS — I07.1 RHEUMATIC TRICUSPID INSUFFICIENCY: ICD-10-CM

## 2023-01-01 DIAGNOSIS — Z00.00 ENCOUNTER FOR GENERAL ADULT MEDICAL EXAMINATION W/OUT ABNORMAL FINDINGS: ICD-10-CM

## 2023-01-01 DIAGNOSIS — G62.9 POLYNEUROPATHY, UNSPECIFIED: ICD-10-CM

## 2023-01-01 DIAGNOSIS — K05.6 PERIODONTAL DISEASE, UNSPECIFIED: ICD-10-CM

## 2023-01-01 DIAGNOSIS — Z90.710 ACQUIRED ABSENCE OF BOTH CERVIX AND UTERUS: Chronic | ICD-10-CM

## 2023-01-01 DIAGNOSIS — I87.2 VENOUS INSUFFICIENCY (CHRONIC) (PERIPHERAL): ICD-10-CM

## 2023-01-01 DIAGNOSIS — R42 DIZZINESS AND GIDDINESS: ICD-10-CM

## 2023-01-01 LAB
ALBUMIN SERPL ELPH-MCNC: 4.1 G/DL
ALBUMIN SERPL ELPH-MCNC: 4.3 G/DL
ALP BLD-CCNC: 119 U/L
ALP BLD-CCNC: 131 U/L
ALT SERPL-CCNC: 14 U/L
ALT SERPL-CCNC: 15 U/L
ANION GAP SERPL CALC-SCNC: 12 MMOL/L
ANION GAP SERPL CALC-SCNC: 14 MMOL/L
ANION GAP SERPL CALC-SCNC: 9 MMOL/L
AST SERPL-CCNC: 26 U/L
AST SERPL-CCNC: 30 U/L
BASOPHILS # BLD AUTO: 0.05 K/UL
BASOPHILS NFR BLD AUTO: 1.1 %
BILIRUB SERPL-MCNC: 0.6 MG/DL
BILIRUB SERPL-MCNC: 0.7 MG/DL
BUN SERPL-MCNC: 13 MG/DL
BUN SERPL-MCNC: 17 MG/DL
BUN SERPL-MCNC: 17 MG/DL
CALCIUM SERPL-MCNC: 9.7 MG/DL
CALCIUM SERPL-MCNC: 9.8 MG/DL
CALCIUM SERPL-MCNC: 9.9 MG/DL
CHLORIDE SERPL-SCNC: 106 MMOL/L
CHLORIDE SERPL-SCNC: 107 MMOL/L
CHLORIDE SERPL-SCNC: 109 MMOL/L
CO2 SERPL-SCNC: 22 MMOL/L
CO2 SERPL-SCNC: 25 MMOL/L
CO2 SERPL-SCNC: 26 MMOL/L
CREAT SERPL-MCNC: 0.77 MG/DL
CREAT SERPL-MCNC: 0.79 MG/DL
CREAT SERPL-MCNC: 0.84 MG/DL
EGFR: 64 ML/MIN/1.73M2
EGFR: 69 ML/MIN/1.73M2
EGFR: 71 ML/MIN/1.73M2
EOSINOPHIL # BLD AUTO: 0.12 K/UL
EOSINOPHIL NFR BLD AUTO: 2.6 %
FOLATE SERPL-MCNC: 8.4 NG/ML
GLUCOSE SERPL-MCNC: 79 MG/DL
GLUCOSE SERPL-MCNC: 82 MG/DL
GLUCOSE SERPL-MCNC: 88 MG/DL
HCT VFR BLD CALC: 44.3 %
HGB BLD-MCNC: 13.7 G/DL
IMM GRANULOCYTES NFR BLD AUTO: 0.2 %
LYMPHOCYTES # BLD AUTO: 1.2 K/UL
LYMPHOCYTES NFR BLD AUTO: 25.6 %
MAN DIFF?: NORMAL
MCHC RBC-ENTMCNC: 30.1 PG
MCHC RBC-ENTMCNC: 30.9 GM/DL
MCV RBC AUTO: 97.4 FL
MONOCYTES # BLD AUTO: 0.29 K/UL
MONOCYTES NFR BLD AUTO: 6.2 %
NEUTROPHILS # BLD AUTO: 3.02 K/UL
NEUTROPHILS NFR BLD AUTO: 64.3 %
NT-PROBNP SERPL-MCNC: 752 PG/ML
NT-PROBNP SERPL-MCNC: 793 PG/ML
NT-PROBNP SERPL-MCNC: 970 PG/ML
PLATELET # BLD AUTO: 172 K/UL
POTASSIUM SERPL-SCNC: 4.4 MMOL/L
POTASSIUM SERPL-SCNC: 4.7 MMOL/L
POTASSIUM SERPL-SCNC: 5.2 MMOL/L
PROT SERPL-MCNC: 6.9 G/DL
PROT SERPL-MCNC: 7 G/DL
RBC # BLD: 4.55 M/UL
RBC # FLD: 14.4 %
SODIUM SERPL-SCNC: 142 MMOL/L
SODIUM SERPL-SCNC: 143 MMOL/L
SODIUM SERPL-SCNC: 144 MMOL/L
T4 FREE SERPL-MCNC: 1.2 NG/DL
TSH SERPL-ACNC: 0.66 UIU/ML
VIT B12 SERPL-MCNC: 788 PG/ML
WBC # FLD AUTO: 4.69 K/UL

## 2023-01-01 PROCEDURE — 93306 TTE W/DOPPLER COMPLETE: CPT

## 2023-01-01 PROCEDURE — 90662 IIV NO PRSV INCREASED AG IM: CPT

## 2023-01-01 PROCEDURE — 99213 OFFICE O/P EST LOW 20 MIN: CPT

## 2023-01-01 PROCEDURE — 99214 OFFICE O/P EST MOD 30 MIN: CPT

## 2023-01-01 PROCEDURE — 99214 OFFICE O/P EST MOD 30 MIN: CPT | Mod: 25

## 2023-01-01 PROCEDURE — 36415 COLL VENOUS BLD VENIPUNCTURE: CPT

## 2023-01-01 PROCEDURE — 93000 ELECTROCARDIOGRAM COMPLETE: CPT

## 2023-01-01 PROCEDURE — 99215 OFFICE O/P EST HI 40 MIN: CPT | Mod: GC,25

## 2023-01-01 PROCEDURE — 93306 TTE W/DOPPLER COMPLETE: CPT | Mod: 26

## 2023-01-01 PROCEDURE — G0008: CPT

## 2023-01-01 RX ORDER — FLUTICASONE PROPIONATE 50 UG/1
50 SPRAY, METERED NASAL DAILY
Qty: 1 | Refills: 0 | Status: ACTIVE | COMMUNITY
Start: 2023-01-01 | End: 1900-01-01

## 2023-01-01 RX ORDER — METOPROLOL SUCCINATE 25 MG/1
25 TABLET, EXTENDED RELEASE ORAL
Qty: 90 | Refills: 3 | Status: ACTIVE | COMMUNITY
Start: 2020-10-20 | End: 1900-01-01

## 2023-01-01 RX ORDER — MELATONIN 2.5MG/10ML
600 LIQUID (ML) ORAL
Qty: 90 | Refills: 3 | Status: ACTIVE | COMMUNITY
Start: 2023-01-01 | End: 1900-01-01

## 2023-01-01 RX ORDER — HYDROCORTISONE 10 MG/G
1 CREAM TOPICAL
Qty: 1 | Refills: 0 | Status: ACTIVE | COMMUNITY
Start: 2023-01-01 | End: 1900-01-01

## 2023-01-01 RX ORDER — TORSEMIDE 20 MG/1
20 TABLET ORAL DAILY
Qty: 90 | Refills: 3 | Status: ACTIVE | COMMUNITY
Start: 1900-01-01 | End: 1900-01-01

## 2023-01-01 RX ORDER — APIXABAN 2.5 MG/1
2.5 TABLET, FILM COATED ORAL
Qty: 180 | Refills: 2 | Status: ACTIVE | COMMUNITY
Start: 1900-01-01 | End: 1900-01-01

## 2023-01-01 RX ORDER — TORSEMIDE 10 MG/1
10 TABLET ORAL
Qty: 90 | Refills: 3 | Status: DISCONTINUED | COMMUNITY
Start: 2023-03-22 | End: 2023-01-01

## 2023-01-27 NOTE — HISTORY OF PRESENT ILLNESS
[Home] : at home, [unfilled] , at the time of the visit. [Other Location: e.g. Home (Enter Location, City,State)___] : at [unfilled] [Verbal consent obtained from patient] : the patient, [unfilled] [FreeTextEntry8] : 89 yo female with extensive cardiac history reports +covid and feeling lightheaded with near syncopal episode this morning. Denies chest pain but states still with dizziness. Lives alone,  No complaints

## 2023-02-01 ENCOUNTER — EMERGENCY (EMERGENCY)
Facility: HOSPITAL | Age: 88
LOS: 1 days | Discharge: HOME | End: 2023-02-01
Attending: STUDENT IN AN ORGANIZED HEALTH CARE EDUCATION/TRAINING PROGRAM | Admitting: STUDENT IN AN ORGANIZED HEALTH CARE EDUCATION/TRAINING PROGRAM
Payer: MEDICARE

## 2023-02-01 VITALS
SYSTOLIC BLOOD PRESSURE: 155 MMHG | HEART RATE: 55 BPM | OXYGEN SATURATION: 97 % | HEIGHT: 66 IN | RESPIRATION RATE: 16 BRPM | WEIGHT: 125 LBS | DIASTOLIC BLOOD PRESSURE: 95 MMHG | TEMPERATURE: 98 F

## 2023-02-01 VITALS
TEMPERATURE: 98 F | RESPIRATION RATE: 18 BRPM | HEART RATE: 62 BPM | OXYGEN SATURATION: 96 % | DIASTOLIC BLOOD PRESSURE: 106 MMHG | SYSTOLIC BLOOD PRESSURE: 171 MMHG

## 2023-02-01 DIAGNOSIS — I11.0 HYPERTENSIVE HEART DISEASE WITH HEART FAILURE: ICD-10-CM

## 2023-02-01 DIAGNOSIS — R20.2 PARESTHESIA OF SKIN: ICD-10-CM

## 2023-02-01 DIAGNOSIS — Z98.890 OTHER SPECIFIED POSTPROCEDURAL STATES: Chronic | ICD-10-CM

## 2023-02-01 DIAGNOSIS — Z90.710 ACQUIRED ABSENCE OF BOTH CERVIX AND UTERUS: Chronic | ICD-10-CM

## 2023-02-01 DIAGNOSIS — Z53.29 PROCEDURE AND TREATMENT NOT CARRIED OUT BECAUSE OF PATIENT'S DECISION FOR OTHER REASONS: ICD-10-CM

## 2023-02-01 DIAGNOSIS — Z20.822 CONTACT WITH AND (SUSPECTED) EXPOSURE TO COVID-19: ICD-10-CM

## 2023-02-01 DIAGNOSIS — R29.898 OTHER SYMPTOMS AND SIGNS INVOLVING THE MUSCULOSKELETAL SYSTEM: ICD-10-CM

## 2023-02-01 DIAGNOSIS — Z79.82 LONG TERM (CURRENT) USE OF ASPIRIN: ICD-10-CM

## 2023-02-01 DIAGNOSIS — I50.32 CHRONIC DIASTOLIC (CONGESTIVE) HEART FAILURE: ICD-10-CM

## 2023-02-01 DIAGNOSIS — Z88.0 ALLERGY STATUS TO PENICILLIN: ICD-10-CM

## 2023-02-01 DIAGNOSIS — Z90.710 ACQUIRED ABSENCE OF BOTH CERVIX AND UTERUS: ICD-10-CM

## 2023-02-01 DIAGNOSIS — Z79.01 LONG TERM (CURRENT) USE OF ANTICOAGULANTS: ICD-10-CM

## 2023-02-01 DIAGNOSIS — I48.91 UNSPECIFIED ATRIAL FIBRILLATION: ICD-10-CM

## 2023-02-01 LAB
ALBUMIN SERPL ELPH-MCNC: 4.4 G/DL — SIGNIFICANT CHANGE UP (ref 3.3–5)
ALP SERPL-CCNC: 114 U/L — SIGNIFICANT CHANGE UP (ref 40–120)
ALT FLD-CCNC: 16 U/L — SIGNIFICANT CHANGE UP (ref 10–45)
ANION GAP SERPL CALC-SCNC: 9 MMOL/L — SIGNIFICANT CHANGE UP (ref 5–17)
APTT BLD: 34.3 SEC — SIGNIFICANT CHANGE UP (ref 27.5–35.5)
AST SERPL-CCNC: 32 U/L — SIGNIFICANT CHANGE UP (ref 10–40)
BASOPHILS # BLD AUTO: 0.06 K/UL — SIGNIFICANT CHANGE UP (ref 0–0.2)
BASOPHILS NFR BLD AUTO: 1 % — SIGNIFICANT CHANGE UP (ref 0–2)
BILIRUB SERPL-MCNC: 0.8 MG/DL — SIGNIFICANT CHANGE UP (ref 0.2–1.2)
BUN SERPL-MCNC: 14 MG/DL — SIGNIFICANT CHANGE UP (ref 7–23)
CALCIUM SERPL-MCNC: 9.5 MG/DL — SIGNIFICANT CHANGE UP (ref 8.4–10.5)
CHLORIDE SERPL-SCNC: 103 MMOL/L — SIGNIFICANT CHANGE UP (ref 96–108)
CO2 SERPL-SCNC: 26 MMOL/L — SIGNIFICANT CHANGE UP (ref 22–31)
CREAT SERPL-MCNC: 0.77 MG/DL — SIGNIFICANT CHANGE UP (ref 0.5–1.3)
EGFR: 71 ML/MIN/1.73M2 — SIGNIFICANT CHANGE UP
EOSINOPHIL # BLD AUTO: 0.07 K/UL — SIGNIFICANT CHANGE UP (ref 0–0.5)
EOSINOPHIL NFR BLD AUTO: 1.1 % — SIGNIFICANT CHANGE UP (ref 0–6)
GLUCOSE SERPL-MCNC: 89 MG/DL — SIGNIFICANT CHANGE UP (ref 70–99)
HCT VFR BLD CALC: 42.3 % — SIGNIFICANT CHANGE UP (ref 34.5–45)
HGB BLD-MCNC: 14 G/DL — SIGNIFICANT CHANGE UP (ref 11.5–15.5)
IMM GRANULOCYTES NFR BLD AUTO: 0.3 % — SIGNIFICANT CHANGE UP (ref 0–0.9)
INR BLD: 1.43 — HIGH (ref 0.88–1.16)
LYMPHOCYTES # BLD AUTO: 0.96 K/UL — LOW (ref 1–3.3)
LYMPHOCYTES # BLD AUTO: 15.6 % — SIGNIFICANT CHANGE UP (ref 13–44)
MAGNESIUM SERPL-MCNC: 1.9 MG/DL — SIGNIFICANT CHANGE UP (ref 1.6–2.6)
MCHC RBC-ENTMCNC: 30.8 PG — SIGNIFICANT CHANGE UP (ref 27–34)
MCHC RBC-ENTMCNC: 33.1 GM/DL — SIGNIFICANT CHANGE UP (ref 32–36)
MCV RBC AUTO: 93 FL — SIGNIFICANT CHANGE UP (ref 80–100)
MONOCYTES # BLD AUTO: 0.34 K/UL — SIGNIFICANT CHANGE UP (ref 0–0.9)
MONOCYTES NFR BLD AUTO: 5.5 % — SIGNIFICANT CHANGE UP (ref 2–14)
NEUTROPHILS # BLD AUTO: 4.72 K/UL — SIGNIFICANT CHANGE UP (ref 1.8–7.4)
NEUTROPHILS NFR BLD AUTO: 76.5 % — SIGNIFICANT CHANGE UP (ref 43–77)
NRBC # BLD: 0 /100 WBCS — SIGNIFICANT CHANGE UP (ref 0–0)
PHOSPHATE SERPL-MCNC: 3.4 MG/DL — SIGNIFICANT CHANGE UP (ref 2.5–4.5)
PLATELET # BLD AUTO: 187 K/UL — SIGNIFICANT CHANGE UP (ref 150–400)
POTASSIUM SERPL-MCNC: 4.3 MMOL/L — SIGNIFICANT CHANGE UP (ref 3.5–5.3)
POTASSIUM SERPL-SCNC: 4.3 MMOL/L — SIGNIFICANT CHANGE UP (ref 3.5–5.3)
PROT SERPL-MCNC: 7.4 G/DL — SIGNIFICANT CHANGE UP (ref 6–8.3)
PROTHROM AB SERPL-ACNC: 17.1 SEC — HIGH (ref 10.5–13.4)
RAPID RVP RESULT: SIGNIFICANT CHANGE UP
RBC # BLD: 4.55 M/UL — SIGNIFICANT CHANGE UP (ref 3.8–5.2)
RBC # FLD: 14.6 % — HIGH (ref 10.3–14.5)
SARS-COV-2 RNA SPEC QL NAA+PROBE: SIGNIFICANT CHANGE UP
SODIUM SERPL-SCNC: 138 MMOL/L — SIGNIFICANT CHANGE UP (ref 135–145)
WBC # BLD: 6.17 K/UL — SIGNIFICANT CHANGE UP (ref 3.8–10.5)
WBC # FLD AUTO: 6.17 K/UL — SIGNIFICANT CHANGE UP (ref 3.8–10.5)

## 2023-02-01 PROCEDURE — 80053 COMPREHEN METABOLIC PANEL: CPT

## 2023-02-01 PROCEDURE — 71045 X-RAY EXAM CHEST 1 VIEW: CPT | Mod: 26

## 2023-02-01 PROCEDURE — 71045 X-RAY EXAM CHEST 1 VIEW: CPT

## 2023-02-01 PROCEDURE — 70450 CT HEAD/BRAIN W/O DYE: CPT | Mod: 26,MA,59

## 2023-02-01 PROCEDURE — 99285 EMERGENCY DEPT VISIT HI MDM: CPT | Mod: 25

## 2023-02-01 PROCEDURE — 83735 ASSAY OF MAGNESIUM: CPT

## 2023-02-01 PROCEDURE — 70498 CT ANGIOGRAPHY NECK: CPT | Mod: MA

## 2023-02-01 PROCEDURE — 85730 THROMBOPLASTIN TIME PARTIAL: CPT

## 2023-02-01 PROCEDURE — 36415 COLL VENOUS BLD VENIPUNCTURE: CPT

## 2023-02-01 PROCEDURE — 0225U NFCT DS DNA&RNA 21 SARSCOV2: CPT

## 2023-02-01 PROCEDURE — 70450 CT HEAD/BRAIN W/O DYE: CPT | Mod: MA

## 2023-02-01 PROCEDURE — 70496 CT ANGIOGRAPHY HEAD: CPT | Mod: 26,MA

## 2023-02-01 PROCEDURE — 85025 COMPLETE CBC W/AUTO DIFF WBC: CPT

## 2023-02-01 PROCEDURE — 85610 PROTHROMBIN TIME: CPT

## 2023-02-01 PROCEDURE — 70496 CT ANGIOGRAPHY HEAD: CPT | Mod: MA

## 2023-02-01 PROCEDURE — 93005 ELECTROCARDIOGRAM TRACING: CPT

## 2023-02-01 PROCEDURE — 99285 EMERGENCY DEPT VISIT HI MDM: CPT

## 2023-02-01 PROCEDURE — 84100 ASSAY OF PHOSPHORUS: CPT

## 2023-02-01 PROCEDURE — 70498 CT ANGIOGRAPHY NECK: CPT | Mod: 26,MA

## 2023-02-01 NOTE — H&P ADULT - HISTORY OF PRESENT ILLNESS
**STROKE HPI***    HPI: 89y Female with PMHx of     PAST MEDICAL & SURGICAL HISTORY:  Diastolic heart failure      Mitral regurgitation      Atrial fibrillation      HTN (hypertension)      H/O exploratory laparotomy      H/O total hysterectomy          FAMILY HISTORY:      SOCIAL HISTORY:   Patient lives with self at home.   Smoking status: Denies  Drinking: Denies  Drug Use: Denies        T(C): 36.8 (02-01-23 @ 17:23), Max: 36.9 (02-01-23 @ 15:32)  HR: 62 (02-01-23 @ 17:23) (55 - 62)  BP: 171/106 (02-01-23 @ 17:23) (155/95 - 171/106)  RR: 18 (02-01-23 @ 17:23) (16 - 18)  SpO2: 96% (02-01-23 @ 17:23) (96% - 97%)    MEDICATION RECONCILIATION   MEDICATIONS  (STANDING):    MEDICATIONS  (PRN):    Allergies    penicillin (Hives; Blisters)    Intolerances      Vital Signs Last 24 Hrs  T(C): 36.8 (01 Feb 2023 17:23), Max: 36.9 (01 Feb 2023 15:32)  T(F): 98.3 (01 Feb 2023 17:23), Max: 98.5 (01 Feb 2023 15:32)  HR: 62 (01 Feb 2023 17:23) (55 - 62)  BP: 171/106 (01 Feb 2023 17:23) (155/95 - 171/106)  BP(mean): --  RR: 18 (01 Feb 2023 17:23) (16 - 18)  SpO2: 96% (01 Feb 2023 17:23) (96% - 97%)    Parameters below as of 01 Feb 2023 15:32  Patient On (Oxygen Delivery Method): room air        Physical exam:  General: No acute distress, awake and alert  Cardiovascular: Regular rate and rhythm, no murmurs, rubs, or gallops. No bruits  Pulmonary: Anterior breath sounds clear bilaterally, no crackles or wheezing. No use of accessory muscles  GI: Abdomen soft, non-tender, non-distended    Neurologic:  -Mental status: Awake, alert, oriented to person, place, and time. Speech is fluent with intact naming, repetition, and comprehension, no dysarthria. Recent and remote memory intact. Follows commands. Attention/concentration intact. Fund of knowledge appropriate.  -Cranial nerves:   II: Visual fields are full to confrontation.  III, IV, VI: Extraocular movements are intact without nystagmus. Pupils equally round and reactive to light  V:  Facial sensation V1-V3 equal and intact   VII: Face is symmetric with normal eye closure and smile  VIII: Hearing is bilaterally intact to finger rub  IX, X: Uvula is midline and soft palate rises symmetrically  XI: Head turning and shoulder shrug are intact.  XII: Tongue protrudes midline  Motor: Normal bulk and tone. No pronator drift. Strength bilateral upper extremity 5/5, bilateral lower extremities 5/5.  Rapid alternating movements intact and symmetric  Sensation: Intact to light touch bilaterally. No neglect or extinction on double simultaneous testing.  Coordination: No dysmetria on finger-to-nose and heel-to-shin bilaterally  Reflexes: Downgoing toes bilaterally   Gait: Narrow gait and steady    NIHSS: **** ASPECT Score: *** ICH Score: *** (GCS)    Fingerstick Blood Glucose: CAPILLARY BLOOD GLUCOSE        LABS:                        14.0   6.17  )-----------( 187      ( 01 Feb 2023 16:24 )             42.3     02-01    138  |  103  |  14  ----------------------------<  89  4.3   |  26  |  0.77    Ca    9.5      01 Feb 2023 16:24  Phos  3.4     02-01  Mg     1.9     02-01    TPro  7.4  /  Alb  4.4  /  TBili  0.8  /  DBili  x   /  AST  32  /  ALT  16  /  AlkPhos  114  02-01    PT/INR - ( 01 Feb 2023 16:24 )   PT: 17.1 sec;   INR: 1.43          PTT - ( 01 Feb 2023 16:24 )  PTT:34.3 sec          RADIOLOGY & ADDITIONAL STUDIES:    HCT:     CTA:    -----------------------------------------------------------------------------------------    ASSESSMENT/PLAN:    89y Female w/ PMH ***. HCT showed ***. CTA showed ***. Given *** tPA was ***. Patient was admitted to **** for further workup    **STROKE HPI***    HPI: 89y Female with PMHx of transcatheter repair of mitral valve using multiple leaflet clips in 2019 w/ Dr. Rojas, HTN, Afib (on Eliquis), hx of GI bleed, chronic diastolic heart failure EF 66% (June 2022) Stroke was consulted after she presented to the ED with right hand and right foot numbness and tingling that she noted when she woke up this morning. The patient was in her usual state of health last night and went to sleep feeling normal. She is unsure if she slept with pressure on her right hand but when she woke up in the morning her middle and ring finger of her right hand was numb. She was not concerned until around noon when she went to write a check and noticed that her handwriting was different and she was writing letters "rounder than usual" and felt that she was having difficulty holding onto her pen. She was unable to say if this was due to weakness or numbness but did notice more difficulty. Since that time her symptoms have improved somewhat and she feels that the numbness is about 50% on a scale from 0-100. She does mention that it has improved somewhat during our conversation. She's also been noticing some intermittent right big toe numbness that she has had in the past. Neuro exam is non-focal other that subjective numbness in right fingers. NIHSS 1. CTH completed in the ED negative for any acute infarct or hemorrhage. Not a candidate for thrombolysis because she is out of the time window.     PAST MEDICAL & SURGICAL HISTORY:  Diastolic heart failure      Mitral regurgitation      Atrial fibrillation      HTN (hypertension)      H/O exploratory laparotomy      H/O total hysterectomy          FAMILY HISTORY:      SOCIAL HISTORY:   Patient lives with self at home.   Smoking status: Denies  Drinking: Denies  Drug Use: Denies        T(C): 36.8 (02-01-23 @ 17:23), Max: 36.9 (02-01-23 @ 15:32)  HR: 62 (02-01-23 @ 17:23) (55 - 62)  BP: 171/106 (02-01-23 @ 17:23) (155/95 - 171/106)  RR: 18 (02-01-23 @ 17:23) (16 - 18)  SpO2: 96% (02-01-23 @ 17:23) (96% - 97%)    MEDICATION RECONCILIATION   MEDICATIONS  (STANDING):    MEDICATIONS  (PRN):    Allergies    penicillin (Hives; Blisters)    Intolerances      Vital Signs Last 24 Hrs  T(C): 36.8 (01 Feb 2023 17:23), Max: 36.9 (01 Feb 2023 15:32)  T(F): 98.3 (01 Feb 2023 17:23), Max: 98.5 (01 Feb 2023 15:32)  HR: 62 (01 Feb 2023 17:23) (55 - 62)  BP: 171/106 (01 Feb 2023 17:23) (155/95 - 171/106)  BP(mean): --  RR: 18 (01 Feb 2023 17:23) (16 - 18)  SpO2: 96% (01 Feb 2023 17:23) (96% - 97%)    Parameters below as of 01 Feb 2023 15:32  Patient On (Oxygen Delivery Method): room air        Physical exam:  General: No acute distress, awake and alert  Cardiovascular: Regular rate and rhythm, no murmurs, rubs, or gallops. No bruits  Pulmonary: Anterior breath sounds clear bilaterally, no crackles or wheezing. No use of accessory muscles  GI: Abdomen soft, non-tender, non-distended    Neurologic:  -Mental status: Awake, alert, oriented to person, place, and time. Speech is fluent with intact naming, repetition, and comprehension, no dysarthria. Recent and remote memory intact. Follows commands. Attention/concentration intact. Fund of knowledge appropriate.  -Cranial nerves:   II: Visual fields are full to confrontation.  III, IV, VI: Extraocular movements are intact without nystagmus. Pupils equally round and reactive to light  V:  Facial sensation V1-V3 equal and intact   VII: Face is symmetric with normal eye closure and smile  VIII: Hearing is bilaterally intact to finger rub  IX, X: Uvula is midline and soft palate rises symmetrically  XI: Head turning and shoulder shrug are intact.  XII: Tongue protrudes midline  Motor: Normal bulk and tone. No pronator drift. Strength bilateral upper extremity 5/5, bilateral lower extremities 5/5.  Rapid alternating movements intact and symmetric  Sensation: Intact to light touch bilaterally. No neglect or extinction on double simultaneous testing.  Coordination: No dysmetria on finger-to-nose and heel-to-shin bilaterally  Reflexes: Downgoing toes bilaterally   Gait: Narrow gait and steady    NIHSS: **** ASPECT Score: *** ICH Score: *** (GCS)    Fingerstick Blood Glucose: CAPILLARY BLOOD GLUCOSE        LABS:                        14.0   6.17  )-----------( 187      ( 01 Feb 2023 16:24 )             42.3     02-01    138  |  103  |  14  ----------------------------<  89  4.3   |  26  |  0.77    Ca    9.5      01 Feb 2023 16:24  Phos  3.4     02-01  Mg     1.9     02-01    TPro  7.4  /  Alb  4.4  /  TBili  0.8  /  DBili  x   /  AST  32  /  ALT  16  /  AlkPhos  114  02-01    PT/INR - ( 01 Feb 2023 16:24 )   PT: 17.1 sec;   INR: 1.43          PTT - ( 01 Feb 2023 16:24 )  PTT:34.3 sec          RADIOLOGY & ADDITIONAL STUDIES:    HCT:     CTA:    -----------------------------------------------------------------------------------------    ASSESSMENT/PLAN:    89y Female w/ PMH ***. HCT showed ***. CTA showed ***. Given *** tPA was ***. Patient was admitted to **** for further workup

## 2023-02-01 NOTE — ED PROVIDER NOTE - PROGRESS NOTE DETAILS
MD Tyra: CTH: IMPRESSION: No acute intracranial hemorrhage, acute transcortical   infarction, extra-axial fluid collection, or hydrocephalus. Volume loss   and chronic microvascular ischemic disease similar in appearance to prior   exam. CTA head and neck pending read    pt evaluated by neuro stroke team who recommends admission for MRI and observation due to concern for stroke. pt does not want to stay in hospital and understands the medical concerns.     The pt is clinically sober, AA&Ox3, free from distracting injury.  Throughout our interactions in the ED today, the pt has demonstrated concrete thinking/reasoning, has maintained an orderly/reasonable conversation, appears to have intact insight/judgment/reason and therefore in our opinion has capacity to make decisions.  Given the patient's presentation, we communicated our concern for stroke, permanent disability, or death in laymans terms.    The pt verbalized an understanding of our worries. We’ve told the patient that the ED evaluation is incomplete & many troublesome conditions haven’t been r/o. We have discussed the need for further ED w/u so we can get more information about stroke, permanent disability, or death.  We have discussed the range of possible dx, potential testing & tx options.  We’ve made  numerous efforts to prevent the pt from leaving AMA.  Our discussions included the potential outcomes of leaving AMA, including worsening of their condition, becoming permanently disabled/in pain/critically ill, or death.  Despite these efforts, we were unable to convince the pt to stay.  The pt is refusing any  further care and is leaving against medical advice. We have attempted to offer tx/rx/guidance for any dangerous conditions which are most likely and/or dangerous.  We have answered all questions and have implored the pt to return ASAP to complete the w/u.  A staff member witnessed the patient consenting to AMA.

## 2023-02-01 NOTE — ED ADULT TRIAGE NOTE - CHIEF COMPLAINT QUOTE
Pt presents to the ED c/o tingling to right 2nd and 3rd finger that began last night. Pt w/ full sensation but states, "I cant really  a pen." Denies slurred speech, numbness, weakness, CP, SOB.

## 2023-02-01 NOTE — ED ADULT NURSE REASSESSMENT NOTE - NS ED NURSE REASSESS COMMENT FT1
Patient is awake and alert, axox3. Spont breathing on RA, ambulates well without assistance. Pt states that she is not able to stay and wants to leave as she has to take care of "things at home." Patient made aware of the importance of further eval and the need to stay in the hospital, patient continues to refuse. Patient assessed with MD and RN at bedside. IV removed and patient signed appropriate paperwork.

## 2023-02-01 NOTE — ED PROVIDER NOTE - OBJECTIVE STATEMENT
89F c PMH of HTN, GIB, Afib on Eliquis, chronic diastolic CHF with, EF 60% 89F RHD c PMH of HTN, GIB, Afib on Eliquis, chronic diastolic CHF with, EF 60% presents with painless R hand weakness and tingling. Pt states she woke up at 0600 today with a sensation of her R hand "being asleep" then later in the day today around 12pm she had difficulty writing with her R hand. pt is RHD. denies trauma. denies hx of similar sx. LKN was 9pm yesterday 1/31/23 when she went to sleep, woke up with sx today. denies associated sx denies facial droop/slurred speech/dizziness/aphasia/ataxia/other weakness.

## 2023-02-01 NOTE — ED ADULT NURSE REASSESSMENT NOTE - NS ED NURSE REASSESS COMMENT FT1
Patient made aware of return precautions and the need to f/u with specialist. Patient left with family friend, ambulated well without assistance.

## 2023-02-01 NOTE — H&P ADULT - NSHPREVIEWOFSYSTEMS_GEN_ALL_CORE
ROS: ***  Constitutional: No fever, weight loss or fatigue  Eyes: No eye pain, visual disturbances, or discharge  ENMT:  No difficulty hearing, tinnitus, vertigo; No sinus or throat pain  Neck: No pain or stiffness  Respiratory: No cough, wheezing, chills or hemoptysis  Cardiovascular: No chest pain, palpitations, shortness of breath, dizziness or leg swelling  Gastrointestinal: No abdominal pain. No nausea, vomiting or hematemesis; No diarrhea or constipation. Nohematochezia.  Genitourinary: No dysuria, frequency, hematuria or incontinence  Neurological: As per HPI  Skin: No itching, burning, rashes or lesions   Endocrine: No heat or cold intolerance; No hair loss  Musculoskeletal: No joint pain or swelling; No muscle, back or extremity pain  Psychiatric: No depression, anxiety, mood swings or difficulty sleeping  Heme/Lymph: No easy bruising or bleeding gums

## 2023-02-01 NOTE — ED PROVIDER NOTE - PATIENT PORTAL LINK FT
You can access the FollowMyHealth Patient Portal offered by [facility] by registering at the following website: [email or web address]. By joining [employer]’s FollowMyHealth portal, you will also be able to view your health information using other applications (apps) compatible with our system.

## 2023-02-01 NOTE — CONSULT NOTE ADULT - ASSESSMENT
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 Rojas, HTN, Afib (on Eliquis), hx of GI bleed, chronic diastolic heart failure EF 66% (June 2022) Stroke was consulted after she presented to the ED with right hand numbness and difficulty writing that she noted when she woke up this morning. CTH negative for any acute neurological pathology. CTA completed, pending read. Patient recommended to be admitted for TIA workup including MRI. Patient adamant that she will not undergo MRI d/t severe claustrophobia. Initially amendable to being admitted and then adamantly refused saying that she has appointments she needs to go to tomorrow and she cannot stay in the hospital and she will return if her symptoms worsens. Risks and benefits discussed with the patient including stroke and worsening symptoms, patient displays verbal understanding and would still like to leave AMA.

## 2023-02-01 NOTE — ED PROVIDER NOTE - PHYSICAL EXAMINATION
NEURO: pupils 3 mm, PERRL, EOMI (CN III, IV, VI), facial sensation intact to light touch in all 3 divisions bilat (CN V), face is symmetric with normal eye closure, eye opening, and smile (CN VII), hearing is normal to rubbing fingers (CN VII), palate elevates symmetrically, phonation is normal (CN IX, X),  shoulder shrug intact bilat (CN XI), tongue is midline with nl movements and no atrophy (CN XII), finger to nose test nl bilat, negative pronator drift bilat,, speech is clear; 5/5 motor strength BUE and BLE: deltoids, biceps, triceps, wrist flexors/extensors, hand , hip flexors, knee flexors/extensors, plantar/dorsiflexors, hallux flexors/extensors; sensation intact to light touch BUE and BLE: C5-T1 and L3-S1 gait wnl no dysdiadokinesia  2+ radial pulses bilat

## 2023-02-01 NOTE — ED PROVIDER NOTE - NSFOLLOWUPINSTRUCTIONS_ED_ALL_ED_FT
Please reach out to Quynh Coleman ([facility] ED clinical referral coordinator) to assist you with your follow-up appointment with a neurologist.    Monday - Friday 11am-7pm  [phone]  [email or web address]     1. You were seen for hand weakness. A copy of any of your resulted labs, imaging, and findings have been provided to you. Make sure to view any test results that may not have yet resulted at the time of your discharge by creating a FollowMyHealth account at: [email or web address] to sign up for FollowMyHealth. Please review all of your lab, imaging, and all other results in their entirety with your primary care doctor.   2. Continue to take your home medications as prescribed.   3. Follow up with a neurologist and your primary care doctor within 48 hours to assess the symptoms you were seen for in the emergency department and to review all results from your visit. If you don't have a doctor, call [phone] to make an appointment.  4. Return immediately to the emergency department for new, persistent, or worsening symptoms or signs. Return immediately to the emergency department if you have chest pain, shortness of breath, loss of consciousness, facial droop, or numbness or weakness, unsteady gait, or trouble speaking or swallowing.   5. For your for health, you should make healthy food choices and be physically active. Also, you should not smoke or use drugs, and you should not drink alcohol in excess. Please visit [email or web address] for resources and more information.

## 2023-02-01 NOTE — ED ADULT NURSE NOTE - OBJECTIVE STATEMENT
89 y/o female c/o tingling to right 2nd and 3rd finger that began last night. Pt w/ full sensation but states, "I cant really  a pen." Denies slurred speech, numbness, weakness, CP, SOB.

## 2023-02-01 NOTE — ED ADULT NURSE NOTE - NSICDXPASTSURGICALHX_GEN_ALL_CORE_FT
Date Of Previous Biopsy (Optional): 6/10/20 PAST SURGICAL HISTORY:  H/O exploratory laparotomy     H/O total hysterectomy

## 2023-02-01 NOTE — ED PROVIDER NOTE - CLINICAL SUMMARY MEDICAL DECISION MAKING FREE TEXT BOX
89F RHD c PMH of HTN, GIB, Afib on Eliquis, chronic diastolic CHF with, EF 60% presents with painless R hand weakness and tingling. Pt states she woke up at 0600 today with a sensation of her R hand "being asleep" then later in the day today around 12pm she had difficulty writing with her R hand. LKN yesterday 9pm. On exam, VS grossly, non-focal neuro exam.     ddx: tia vs cva vs carpal tunnel     Plan:   - labs: wnl  - CTH: IMPRESSION: No acute intracranial hemorrhage, acute transcortical infarction, extra-axial fluid collection, or hydrocephalus. Volume loss and chronic microvascular ischemic disease similar in appearance to prior exam.  - UA: pending  - rvp: negative  - CTA head and neck: pending final read  - pt with reports of hand weakess however full strength in right hand and non-focal neuro exam also outside of window for tpa, no code stroke called however evaluated by neuro stroke team pending final recs

## 2023-02-01 NOTE — CONSULT NOTE ADULT - SUBJECTIVE AND OBJECTIVE BOX
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 Rojas, HTN, Afib (on Eliquis), hx of GI bleed, chronic diastolic heart failure EF 66% (June 2022) Stroke was consulted after she presented to the ED with right hand numbness that she noted when she woke up this morning. The patient was in her usual state of health last night and went to sleep feeling normal. She is unsure if she slept with pressure on her right hand but when she woke up in the morning her middle and ring finger of her right hand was numb. She was not concerned until around noon when she went to write a check and noticed that her handwriting was different and she was writing letters "rounder than usual" and felt that she was having difficulty holding onto her pen. She was unable to say if this was due to weakness or numbness but did notice more difficulty. Since that time her symptoms have improved somewhat and she feels that the numbness is about 50% on a scale from 0-100. She does mention that it has improved somewhat during our conversation. She's also been noticing some intermittent right big toe numbness that she has had in the past. Neuro exam is non-focal other that subjective numbness in right fingers. NIHSS 1. CTH completed in the ED negative for any acute infarct or hemorrhage. Not a candidate for thrombolysis because she is out of the time window. Patient reports compliance with her Eliquis and close follow up with Dr. Rojas. She is very claustrophobic and is not willing to undergo an MRI.     T(C): 36.8 (02-01-23 @ 17:23), Max: 36.9 (02-01-23 @ 15:32)  HR: 62 (02-01-23 @ 17:23) (55 - 62)  BP: 171/106 (02-01-23 @ 17:23) (155/95 - 171/106)  RR: 18 (02-01-23 @ 17:23) (16 - 18)  SpO2: 96% (02-01-23 @ 17:23) (96% - 97%)    PAST MEDICAL & SURGICAL HISTORY:  Diastolic heart failure      Mitral regurgitation      Atrial fibrillation      HTN (hypertension)      H/O exploratory laparotomy      H/O total hysterectomy          FAMILY HISTORY:      SOCIAL HISTORY:   Patient lives with self at home.   Smoking status: Denies  Drinking: Denies  Drug Use: Denies    ROS: As per HPI    MEDICATIONS  (STANDING):    MEDICATIONS  (PRN):    Allergies    penicillin (Hives; Blisters)    Intolerances      Vital Signs Last 24 Hrs  T(C): 36.8 (01 Feb 2023 17:23), Max: 36.9 (01 Feb 2023 15:32)  T(F): 98.3 (01 Feb 2023 17:23), Max: 98.5 (01 Feb 2023 15:32)  HR: 62 (01 Feb 2023 17:23) (55 - 62)  BP: 171/106 (01 Feb 2023 17:23) (155/95 - 171/106)  BP(mean): --  RR: 18 (01 Feb 2023 17:23) (16 - 18)  SpO2: 96% (01 Feb 2023 17:23) (96% - 97%)    Parameters below as of 01 Feb 2023 15:32  Patient On (Oxygen Delivery Method): room air        Physical exam:  Constitutional: No acute distress, conversant  Eyes: Anicteric sclerae, moist conjunctivae, see below for CNs  Neck: trachea midline  Pulmonary: No use of accessory muscles  GI: Abdomen soft  Extremities: b/l lower extremity edema    Neurologic:  -Mental status: Awake, alert, oriented to person, place, and time. Tangential thoughts and speech.  Speech is fluent with intact naming, repetition, and comprehension, no dysarthria.  Remote memory intact, short term memory appears mildly impaired. Follows commands. Attention/concentration intact. Fund of knowledge appropriate.  -Cranial nerves:   II: Visual fields are full to confrontation.  III, IV, VI: Extraocular movements are intact without nystagmus. Pupils equally round and reactive to light  V:  Facial sensation V1-V3 equal and intact   VII: Face is symmetric with normal eye closure and smile  Motor: Normal bulk and tone. No pronator drift. Strength bilateral upper extremity 5/5, bilateral lower extremities 5/5.  Rapid alternating movements intact and symmetric  Sensation: Intact to light touch bilaterally. No neglect or extinction on double simultaneous testing. Mild numbness and tingling in right middle and ring finger.   Coordination: No dysmetria on finger-to-nose bilaterally      NIHSS: 1    Fingerstick Blood Glucose: CAPILLARY BLOOD GLUCOSE        LABS:                        14.0   6.17  )-----------( 187      ( 01 Feb 2023 16:24 )             42.3     02-01    138  |  103  |  14  ----------------------------<  89  4.3   |  26  |  0.77    Ca    9.5      01 Feb 2023 16:24  Phos  3.4     02-01  Mg     1.9     02-01    TPro  7.4  /  Alb  4.4  /  TBili  0.8  /  DBili  x   /  AST  32  /  ALT  16  /  AlkPhos  114  02-01    PT/INR - ( 01 Feb 2023 16:24 )   PT: 17.1 sec;   INR: 1.43          PTT - ( 01 Feb 2023 16:24 )  PTT:34.3 sec          RADIOLOGY & ADDITIONAL STUDIES:  < from: CT Head No Cont (02.01.23 @ 18:40) >  IMPRESSION: No acute intracranial hemorrhage, acute transcortical   infarction, extra-axial fluid collection, or hydrocephalus. Volume loss   and chronic microvascular ischemic disease similar in appearance to prior   exam.    < end of copied text >

## 2023-02-03 ENCOUNTER — APPOINTMENT (OUTPATIENT)
Dept: INTERNAL MEDICINE | Facility: CLINIC | Age: 88
End: 2023-02-03
Payer: MEDICARE

## 2023-02-03 VITALS
BODY MASS INDEX: 19.62 KG/M2 | HEIGHT: 67 IN | HEART RATE: 56 BPM | WEIGHT: 125 LBS | DIASTOLIC BLOOD PRESSURE: 88 MMHG | OXYGEN SATURATION: 100 % | TEMPERATURE: 97.6 F | SYSTOLIC BLOOD PRESSURE: 147 MMHG

## 2023-02-03 DIAGNOSIS — M16.9 OSTEOARTHRITIS OF HIP, UNSPECIFIED: ICD-10-CM

## 2023-02-03 DIAGNOSIS — R29.898 OTHER SYMPTOMS AND SIGNS INVOLVING THE MUSCULOSKELETAL SYSTEM: ICD-10-CM

## 2023-02-03 PROCEDURE — 99214 OFFICE O/P EST MOD 30 MIN: CPT

## 2023-03-22 ENCOUNTER — NON-APPOINTMENT (OUTPATIENT)
Age: 88
End: 2023-03-22

## 2023-03-22 ENCOUNTER — APPOINTMENT (OUTPATIENT)
Dept: HEART AND VASCULAR | Facility: CLINIC | Age: 88
End: 2023-03-22
Payer: MEDICARE

## 2023-03-22 VITALS
OXYGEN SATURATION: 98 % | DIASTOLIC BLOOD PRESSURE: 90 MMHG | SYSTOLIC BLOOD PRESSURE: 116 MMHG | BODY MASS INDEX: 18.99 KG/M2 | WEIGHT: 120.99 LBS | HEART RATE: 99 BPM | TEMPERATURE: 98 F | HEIGHT: 67 IN

## 2023-03-22 PROCEDURE — 93000 ELECTROCARDIOGRAM COMPLETE: CPT

## 2023-03-22 PROCEDURE — 99214 OFFICE O/P EST MOD 30 MIN: CPT

## 2023-03-22 NOTE — HISTORY OF PRESENT ILLNESS
[FreeTextEntry1] : 89 F HTN PAF HFpEF Severe MR s/p Cookie 7/3/2019 Severe residual MR Severe TR\par \par \par here for follow up she feels ok she walks and has no cardiac symptoms woke up with numbness of her hand ct scan was negative and has refused MRI due to claustrophobia has some bilateral ankle swelling she is more sob when she walks \par \par \par \par ecg nsr rbbb 3/22/2023\par \par \par echo 6/2022 LVEF normal 2 cookie devices mean 3 mmHg severe anteriorly directed MR severe TR MIldly dilated right ventricle \par Carotid US no stenosis 12/22/2023

## 2023-03-22 NOTE — PHYSICAL EXAM
[Well Developed] : well developed [Well Nourished] : well nourished [No Acute Distress] : no acute distress [Normal Conjunctiva] : normal conjunctiva [Normal Venous Pressure] : normal venous pressure [No Carotid Bruit] : no carotid bruit [Normal S1, S2] : normal S1, S2 [No Murmur] : no murmur [No Rub] : no rub [No Gallop] : no gallop [Clear Lung Fields] : clear lung fields [Good Air Entry] : good air entry [No Respiratory Distress] : no respiratory distress  [Soft] : abdomen soft [Non Tender] : non-tender [No Masses/organomegaly] : no masses/organomegaly [Normal Bowel Sounds] : normal bowel sounds [Normal Gait] : normal gait [No Edema] : no edema [No Cyanosis] : no cyanosis [No Clubbing] : no clubbing [No Varicosities] : no varicosities [No Rash] : no rash [No Skin Lesions] : no skin lesions [Moves all extremities] : moves all extremities [No Focal Deficits] : no focal deficits [Normal Speech] : normal speech [Alert and Oriented] : alert and oriented [Normal memory] : normal memory [de-identified] : HSM murmur [de-identified] : bilateral leg edema

## 2023-03-22 NOTE — ASSESSMENT
[FreeTextEntry1] : - HTN acceptable for her age\par - leg edema offered to start torsemide 10 mg every other day for leg swelling every other day \par - afib on eliquis and toprolx 25 mg daily\par - cookie mitral valve can stop aspirin she is on eliquis 2.5 mg bid \par - poor balance she is not orthostatic vestibular therapy check labs \par - pain will use topical creams for pain relief [facility]nicare or voltaren\par - fu in three months \par

## 2023-04-18 NOTE — PHYSICAL EXAM
[Well Developed] : well developed [Well Nourished] : well nourished [No Acute Distress] : no acute distress [Normal Conjunctiva] : normal conjunctiva [Normal Venous Pressure] : normal venous pressure [No Carotid Bruit] : no carotid bruit [Normal S1, S2] : normal S1, S2 [No Murmur] : no murmur [No Rub] : no rub [No Gallop] : no gallop [Clear Lung Fields] : clear lung fields [Good Air Entry] : good air entry [No Respiratory Distress] : no respiratory distress  [Soft] : abdomen soft [Non Tender] : non-tender [No Masses/organomegaly] : no masses/organomegaly [Normal Bowel Sounds] : normal bowel sounds [Normal Gait] : normal gait [No Edema] : no edema [No Cyanosis] : no cyanosis [No Clubbing] : no clubbing [No Varicosities] : no varicosities [No Rash] : no rash [No Skin Lesions] : no skin lesions [Moves all extremities] : moves all extremities [No Focal Deficits] : no focal deficits [Normal Speech] : normal speech [Alert and Oriented] : alert and oriented [Normal memory] : normal memory [de-identified] : HSM murmur [de-identified] : mild ankle leg edema

## 2023-04-18 NOTE — ASSESSMENT
[FreeTextEntry1] : - HTN acceptable for her age\par - leg edema offered to start torsemide 10 mg every other day for leg swelling every other day will check labs if ok will increase to daily repeat echo \par - afib on eliquis and toprolx 25 mg daily\par - cookie mitral valve can stop aspirin she is on eliquis 2.5 mg bid \par - poor balance she is not orthostatic vestibular therapy to see  ENT\par -fu in one month\par

## 2023-04-18 NOTE — HISTORY OF PRESENT ILLNESS
[FreeTextEntry1] : 89 F HTN PAF HFpEF Severe MR s/p Cookie 7/3/2019 Severe residual MR Severe TR Neuropathy\par \par \par started on torsemide for leg edema she is more sob when she walks she is more dizzy and lightheaded which occurs without a warning lasts for seconds worse when she turns her head or when she stands up \par \par \par \par ecg nsr rbbb 4/18/2023\par \par \par echo 6/2022 LVEF normal 2 cookie devices mean 3 mmHg severe anteriorly directed MR severe TR MIldly dilated right ventricle \par Carotid US no stenosis 12/22/2023

## 2023-05-16 PROBLEM — G62.9 NEUROPATHY: Status: ACTIVE | Noted: 2023-01-01

## 2023-05-16 NOTE — PHYSICAL EXAM
[Well Developed] : well developed [Well Nourished] : well nourished [No Acute Distress] : no acute distress [Normal Conjunctiva] : normal conjunctiva [Normal Venous Pressure] : normal venous pressure [No Carotid Bruit] : no carotid bruit [Normal S1, S2] : normal S1, S2 [No Murmur] : no murmur [No Rub] : no rub [No Gallop] : no gallop [Clear Lung Fields] : clear lung fields [Good Air Entry] : good air entry [No Respiratory Distress] : no respiratory distress  [Soft] : abdomen soft [Non Tender] : non-tender [No Masses/organomegaly] : no masses/organomegaly [Normal Bowel Sounds] : normal bowel sounds [Normal Gait] : normal gait [No Edema] : no edema [No Cyanosis] : no cyanosis [No Clubbing] : no clubbing [No Varicosities] : no varicosities [No Rash] : no rash [No Skin Lesions] : no skin lesions [Moves all extremities] : moves all extremities [No Focal Deficits] : no focal deficits [Normal Speech] : normal speech [Alert and Oriented] : alert and oriented [Normal memory] : normal memory [de-identified] : HSM murmur [de-identified] : mild ankle leg edema bilaterally

## 2023-05-16 NOTE — ASSESSMENT
[FreeTextEntry1] : - HTN acceptable for her age\par - leg edema she now understands to take torsemide 10 mg dialy\par - afib on eliquis and toprolx 25 mg daily\par - cookie mitral valve can stop aspirin she is on eliquis 2.5 mg bid \par - poor balance she is not orthostatic vestibular therapy to see  ENT\par -fu in one month\par

## 2023-05-16 NOTE — HISTORY OF PRESENT ILLNESS
[FreeTextEntry1] : 89 F HTN PAF HFpEF Severe MR s/p Cookie 7/3/2019 Severe residual MR Severe TR Neuropathy\par \par \par started on torsemide for leg edema she is more sob when she walks she is more dizzy and lightheaded which occurs without a warning lasts for seconds worse when she turns her head or when she stands up she did not increase her torsemide as rx legs are still mildly swollen\par \par \par \par ecg nsr rbbb 5/16/2023\par \par \par echo 6/2022 LVEF normal 2 cookie devices mean 3 mmHg severe anteriorly directed MR severe TR MIldly dilated right ventricle \par Carotid US no stenosis 12/22/2023

## 2023-05-22 PROBLEM — R42 DIZZINESS: Status: ACTIVE | Noted: 2022-12-22

## 2023-05-23 PROBLEM — M54.50 CHRONIC BILATERAL LOW BACK PAIN WITHOUT SCIATICA: Status: ACTIVE | Noted: 2022-06-22

## 2023-05-23 PROBLEM — R09.82 POSTNASAL DRIP: Status: ACTIVE | Noted: 2023-01-01

## 2023-05-23 PROBLEM — I10 HYPERTENSION: Status: ACTIVE | Noted: 2019-05-28

## 2023-05-23 PROBLEM — I07.1 TRICUSPID INSUFFICIENCY: Status: ACTIVE | Noted: 2019-05-08

## 2023-06-09 NOTE — PHYSICAL EXAM
[Normal Appearance] : normal appearance [Normal Jugular Venous V Waves Present] : normal jugular venous V waves present [] : no respiratory distress [Respiration, Rhythm And Depth] : normal respiratory rhythm and effort [Exaggerated Use Of Accessory Muscles For Inspiration] : no accessory muscle use [Auscultation Breath Sounds / Voice Sounds] : lungs were clear to auscultation bilaterally [Heart Rate And Rhythm] : heart rate and rhythm were normal [Heart Sounds] : normal S1 and S2 [Edema] : no peripheral edema present [Abnormal Walk] : normal gait [Cyanosis, Localized] : no localized cyanosis [Oriented To Time, Place, And Person] : oriented to person, place, and time [Extraocular Movements] : extraocular movements were intact [Neck Appearance] : the appearance of the neck was normal [Neck Cervical Mass (___cm)] : no neck mass was observed [Jugular Venous Distention Increased] : there was no jugular-venous distention [Chest Visual Inspection Thoracic Asymmetry] : no chest asymmetry [Diminished Respiratory Excursion] : normal chest expansion [Abdomen Soft] : soft [Abdomen Tenderness] : non-tender [Abdomen Mass (___ Cm)] : no abdominal mass palpated [No Focal Deficits] : no focal deficits [FreeTextEntry1] : + systolic ejection murmur  [Right Carotid Bruit] : no bruit heard over the right carotid [Left Carotid Bruit] : no bruit heard over the left carotid [Right Femoral Bruit] : no bruit heard over the right femoral artery [Left Femoral Bruit] : no bruit heard over the left femoral artery

## 2023-06-09 NOTE — HISTORY OF PRESENT ILLNESS
[FreeTextEntry1] : 89 year old female with a history of HTN, paroxysmal atrial fibrillation (on Xarelto) and chronic diastolic heart failure with severe mitral regurgitation s/p transcatheter mitral valve repair using Saulo on 07/3/19 (Clasp IID research trial) presents for follow up per research protocol. \par \par Since her last visit, patient reports overall doing well. She does feel lightheadedness at time but resolves quickly. She goes out with her friends and stays very active and is happy with her quality of life. Denies CP, SOB, orthopnea, PND, LE edema, fevers, chills, fatigue. \par

## 2023-06-20 PROBLEM — I50.32 CHRONIC DIASTOLIC CONGESTIVE HEART FAILURE: Status: ACTIVE | Noted: 2019-05-28

## 2023-06-20 NOTE — PHYSICAL EXAM
[Well Developed] : well developed [Well Nourished] : well nourished [No Acute Distress] : no acute distress [Normal Conjunctiva] : normal conjunctiva [Normal Venous Pressure] : normal venous pressure [No Carotid Bruit] : no carotid bruit [Normal S1, S2] : normal S1, S2 [No Murmur] : no murmur [No Rub] : no rub [No Gallop] : no gallop [Clear Lung Fields] : clear lung fields [Good Air Entry] : good air entry [No Respiratory Distress] : no respiratory distress  [Soft] : abdomen soft [Non Tender] : non-tender [No Masses/organomegaly] : no masses/organomegaly [Normal Bowel Sounds] : normal bowel sounds [Normal Gait] : normal gait [No Edema] : no edema [No Cyanosis] : no cyanosis [No Clubbing] : no clubbing [No Varicosities] : no varicosities [No Rash] : no rash [No Skin Lesions] : no skin lesions [Moves all extremities] : moves all extremities [No Focal Deficits] : no focal deficits [Normal Speech] : normal speech [Alert and Oriented] : alert and oriented [Normal memory] : normal memory [de-identified] : HSM murmur [de-identified] : mild ankle leg edema bilaterally

## 2023-06-20 NOTE — REASON FOR VISIT
CSE Block      Patient reassessed immediately prior to procedure    Patient location during procedure: OR  Preanesthetic Checklist  Completed: patient identified, site marked, surgical consent, pre-op evaluation, timeout performed, IV checked, risks and benefits discussed and monitors and equipment checked  CSE  Patient position: sitting  Prep: ChloraPrep  Patient monitoring: blood pressure monitoring and continuous pulse oximetry  Procedures: landmark technique and palpation technique  Spinal Needle  Needle type: Pencan   Needle gauge: 25 G  Approach: midline  Location: L3-4  Fluid Appearance: clear    Epidural Needle  Injection technique: MARTINEZ air  Needle type: Tuohy   Needle gauge: 17 G  Location: L3-L4  Loss of Resistance: 7cm  Cath Depth at Skin (cm): 12  Aspiration: negative      Catheter  Catheter type: end hole and side hole  Catheter size: 20 G  Assessment  Dressing:occlusive dressing applied  Pt Tolerance:patient tolerated the procedure well with no apparent complications  Complications:no             [Structural Heart and Valve Disease] : structural heart and valve disease

## 2023-06-20 NOTE — HISTORY OF PRESENT ILLNESS
[FreeTextEntry1] : 89 F HTN PAF HFpEF Severe MR s/p Cookie 7/3/2019 Severe residual MR Severe TR Neuropathy\par \par \par started on torsemide for leg edema she is more sob when she walks she is more dizzy and lightheaded which occurs without a warning lasts for seconds worse when she turns her head or when she stands up she did not increase her torsemide as rx legs are still mildly swollen still unclear if she is taking it every day \par \par \par \par ecg nsr rbbb 5/16/2023\par \par \par echo 6/2022 LVEF normal 2 cookie devices mean 3 mmHg severe anteriorly directed MR severe TR MIldly dilated right ventricle \par Carotid US no stenosis 12/22/2023

## 2023-06-20 NOTE — ASSESSMENT
[FreeTextEntry1] : - HTN acceptable for her age\par - leg edema i don’t think she understands taking torsemide every day confirm with pharmacy she picked it up\par - afib on eliquis and toprolx 25 mg daily\par - cookie mitral valve can stop aspirin she is on eliquis 2.5 mg bid \par - poor balance she is not orthostatic vestibular therapy to see  ENT\par -fu in 3 weeks\par

## 2023-09-08 NOTE — REVIEW OF SYSTEMS
[FreeTextEntry9] : arthritis pain  [de-identified] : lightheadedness  [SOB on Exertion] : no shortness of breath during exertion [Negative] : Psychiatric

## 2023-09-08 NOTE — HISTORY OF PRESENT ILLNESS
[FreeTextEntry1] : 89 year old female with a history of HTN, paroxysmal atrial fibrillation (on Xarelto) and chronic diastolic heart failure with severe mitral regurgitation s/p transcatheter mitral valve repair using Saulo on 07/3/19 (Clasp IID research trial) presents for follow up.   Since her last visit, patient reports increased shortness of breath with exertion

## 2023-09-08 NOTE — PHYSICAL EXAM
[Normal Appearance] : normal appearance [Normal Jugular Venous V Waves Present] : normal jugular venous V waves present [] : no respiratory distress [Respiration, Rhythm And Depth] : normal respiratory rhythm and effort [Exaggerated Use Of Accessory Muscles For Inspiration] : no accessory muscle use [Auscultation Breath Sounds / Voice Sounds] : lungs were clear to auscultation bilaterally [Heart Rate And Rhythm] : heart rate and rhythm were normal [Heart Sounds] : normal S1 and S2 [Edema] : no peripheral edema present [FreeTextEntry1] : + systolic ejection murmur  [Abnormal Walk] : normal gait [Cyanosis, Localized] : no localized cyanosis [Oriented To Time, Place, And Person] : oriented to person, place, and time [Extraocular Movements] : extraocular movements were intact [Neck Appearance] : the appearance of the neck was normal [Neck Cervical Mass (___cm)] : no neck mass was observed [Jugular Venous Distention Increased] : there was no jugular-venous distention [Chest Visual Inspection Thoracic Asymmetry] : no chest asymmetry [Diminished Respiratory Excursion] : normal chest expansion [Right Carotid Bruit] : no bruit heard over the right carotid [Left Carotid Bruit] : no bruit heard over the left carotid [Right Femoral Bruit] : no bruit heard over the right femoral artery [Left Femoral Bruit] : no bruit heard over the left femoral artery [Abdomen Soft] : soft [Abdomen Tenderness] : non-tender [Abdomen Mass (___ Cm)] : no abdominal mass palpated [No Focal Deficits] : no focal deficits

## 2023-09-12 PROBLEM — I34.0 MITRAL INSUFFICIENCY: Status: ACTIVE | Noted: 2019-05-08

## 2023-09-22 PROBLEM — I48.91 ATRIAL FIBRILLATION: Status: ACTIVE | Noted: 2019-05-28

## 2023-09-27 NOTE — REVIEW OF SYSTEMS
1st attempt  Lm to cb with % improvement and pain level.      [Negative] : Cardiovascular [FreeTextEntry9] : arthritis pain  [de-identified] : lightheadedness  [SOB on Exertion] : no shortness of breath during exertion

## 2023-11-16 PROBLEM — Z23 NEED FOR INFLUENZA VACCINATION: Status: ACTIVE | Noted: 2023-01-01

## 2023-11-16 PROBLEM — I87.2 STASIS DERMATITIS: Status: ACTIVE | Noted: 2023-01-01

## 2023-11-21 PROBLEM — Z00.00 ENCOUNTER FOR PREVENTIVE HEALTH EXAMINATION: Status: ACTIVE | Noted: 2019-05-08

## 2023-12-28 PROBLEM — K05.6 GINGIVAL AND PERIODONTAL DISEASE: Status: ACTIVE | Noted: 2023-01-01

## 2023-12-28 NOTE — PHYSICAL EXAM

## 2023-12-28 NOTE — ASSESSMENT
[FreeTextEntry1] : 89F PMHx HTN, pAfib (on Eliquis 2.5 BID), HFpEF, severe MR s/p Saulo, severe TR, LBP/OA, presenting for  gum disease    #Periodontal Disease patient with loosening of teeth and recent hx of teeth falling out 4 days prior to this visit.  - dental referral  #Paroxysmal Afib Follows with Dr. Sharma - c/w Eliquis 2.5mg BID - c/w Toprol 25mg qd  #HFpEF #severe MR #severe TR History of MVR (2019), aortic valve and tricuspid valve insufficiency. Reports frequent LE edema with dryness of skin. Recommended elevation of LE, and stressed importance of follow-up with Dr. Ruiz. - c/w torsemide 20mg qd - f/u with Dr. Ruiz at next available appointment   #HTN Normotensive on exam.  #HCM - Pneumovax - Dec 2022 - Flu shot last visit

## 2023-12-28 NOTE — HISTORY OF PRESENT ILLNESS
[de-identified] : 89F PMHx HTN, pAfib (on Eliquis 2.5 BID), HFpEF, severe MR s/p Saulo, severe TR, LBP/OA, presenting for routine follow up. Last seen in May of this year. No acute complaints at this time. Endorsing gum disease states several of her teeth have fallen out. She states she is preparing for orthodontic surgery and is presenting here for perioperative evaluation before her elective procedure. She states her teeth have fallen out in the last four days. She does not have any surgeries planned thus far. She states she knows she would need an eval before seeing someone. She states she has been able to eat since her teeth have fallen out and she adjusted her diet to include soft, easy to chew foods.

## 2024-01-01 ENCOUNTER — APPOINTMENT (OUTPATIENT)
Dept: HEART AND VASCULAR | Facility: CLINIC | Age: 89
End: 2024-01-01

## 2024-01-01 ENCOUNTER — TRANSCRIPTION ENCOUNTER (OUTPATIENT)
Age: 89
End: 2024-01-01

## 2024-01-01 ENCOUNTER — INPATIENT (INPATIENT)
Facility: HOSPITAL | Age: 89
LOS: 6 days | Discharge: NURSING, REHAB OR LONG-TERM CARE | DRG: 480 | End: 2024-02-28
Attending: STUDENT IN AN ORGANIZED HEALTH CARE EDUCATION/TRAINING PROGRAM | Admitting: INTERNAL MEDICINE
Payer: MEDICARE

## 2024-01-01 ENCOUNTER — INPATIENT (INPATIENT)
Facility: HOSPITAL | Age: 89
LOS: 4 days | Discharge: NURSING, REHAB OR LONG-TERM CARE | DRG: 177 | End: 2024-03-05
Attending: INTERNAL MEDICINE | Admitting: INTERNAL MEDICINE
Payer: MEDICARE

## 2024-01-01 ENCOUNTER — APPOINTMENT (OUTPATIENT)
Dept: HEART AND VASCULAR | Facility: CLINIC | Age: 89
End: 2024-01-01
Payer: MEDICARE

## 2024-01-01 ENCOUNTER — RESULT REVIEW (OUTPATIENT)
Age: 89
End: 2024-01-01

## 2024-01-01 VITALS
SYSTOLIC BLOOD PRESSURE: 107 MMHG | DIASTOLIC BLOOD PRESSURE: 63 MMHG | TEMPERATURE: 98 F | HEART RATE: 79 BPM | OXYGEN SATURATION: 94 % | RESPIRATION RATE: 17 BRPM

## 2024-01-01 VITALS
SYSTOLIC BLOOD PRESSURE: 158 MMHG | TEMPERATURE: 98 F | OXYGEN SATURATION: 97 % | RESPIRATION RATE: 16 BRPM | HEART RATE: 79 BPM | DIASTOLIC BLOOD PRESSURE: 86 MMHG

## 2024-01-01 VITALS
DIASTOLIC BLOOD PRESSURE: 77 MMHG | HEART RATE: 97 BPM | TEMPERATURE: 97 F | WEIGHT: 130.07 LBS | HEIGHT: 65.98 IN | SYSTOLIC BLOOD PRESSURE: 129 MMHG | OXYGEN SATURATION: 98 % | RESPIRATION RATE: 20 BRPM

## 2024-01-01 VITALS
RESPIRATION RATE: 17 BRPM | OXYGEN SATURATION: 98 % | SYSTOLIC BLOOD PRESSURE: 123 MMHG | HEART RATE: 100 BPM | DIASTOLIC BLOOD PRESSURE: 73 MMHG

## 2024-01-01 DIAGNOSIS — Z79.82 LONG TERM (CURRENT) USE OF ASPIRIN: ICD-10-CM

## 2024-01-01 DIAGNOSIS — Z87.19 PERSONAL HISTORY OF OTHER DISEASES OF THE DIGESTIVE SYSTEM: ICD-10-CM

## 2024-01-01 DIAGNOSIS — M19.90 UNSPECIFIED OSTEOARTHRITIS, UNSPECIFIED SITE: ICD-10-CM

## 2024-01-01 DIAGNOSIS — I50.30 UNSPECIFIED DIASTOLIC (CONGESTIVE) HEART FAILURE: ICD-10-CM

## 2024-01-01 DIAGNOSIS — E55.9 VITAMIN D DEFICIENCY, UNSPECIFIED: ICD-10-CM

## 2024-01-01 DIAGNOSIS — I21.A1 MYOCARDIAL INFARCTION TYPE 2: ICD-10-CM

## 2024-01-01 DIAGNOSIS — Z66 DO NOT RESUSCITATE: ICD-10-CM
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
## 2024-01-01 DIAGNOSIS — S72.115A NONDISPLACED FRACTURE OF GREATER TROCHANTER OF LEFT FEMUR, INITIAL ENCOUNTER FOR CLOSED FRACTURE: ICD-10-CM

## 2024-01-01 DIAGNOSIS — Z98.890 OTHER SPECIFIED POSTPROCEDURAL STATES: Chronic | ICD-10-CM

## 2024-01-01 DIAGNOSIS — R79.89 OTHER SPECIFIED ABNORMAL FINDINGS OF BLOOD CHEMISTRY: ICD-10-CM

## 2024-01-01 DIAGNOSIS — I50.32 CHRONIC DIASTOLIC (CONGESTIVE) HEART FAILURE: ICD-10-CM

## 2024-01-01 DIAGNOSIS — I34.0 NONRHEUMATIC MITRAL (VALVE) INSUFFICIENCY: ICD-10-CM

## 2024-01-01 DIAGNOSIS — I45.10 UNSPECIFIED RIGHT BUNDLE-BRANCH BLOCK: ICD-10-CM

## 2024-01-01 DIAGNOSIS — Z79.02 LONG TERM (CURRENT) USE OF ANTITHROMBOTICS/ANTIPLATELETS: ICD-10-CM

## 2024-01-01 DIAGNOSIS — K40.90 UNILATERAL INGUINAL HERNIA, WITHOUT OBSTRUCTION OR GANGRENE, NOT SPECIFIED AS RECURRENT: ICD-10-CM

## 2024-01-01 DIAGNOSIS — S72.115D NONDISPLACED FRACTURE OF GREATER TROCHANTER OF LEFT FEMUR, SUBSEQUENT ENCOUNTER FOR CLOSED FRACTURE WITH ROUTINE HEALING: ICD-10-CM
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
## 2024-01-01 DIAGNOSIS — I49.1 ATRIAL PREMATURE DEPOLARIZATION: ICD-10-CM

## 2024-01-01 DIAGNOSIS — M88.88 OSTEITIS DEFORMANS OF OTHER BONES: ICD-10-CM

## 2024-01-01 DIAGNOSIS — I48.20 CHRONIC ATRIAL FIBRILLATION, UNSPECIFIED: ICD-10-CM

## 2024-01-01 DIAGNOSIS — S72.92XA UNSPECIFIED FRACTURE OF LEFT FEMUR, INITIAL ENCOUNTER FOR CLOSED FRACTURE: ICD-10-CM

## 2024-01-01 DIAGNOSIS — Z79.899 OTHER LONG TERM (CURRENT) DRUG THERAPY: ICD-10-CM

## 2024-01-01 DIAGNOSIS — Z88.0 ALLERGY STATUS TO PENICILLIN: ICD-10-CM

## 2024-01-01 DIAGNOSIS — Z79.01 LONG TERM (CURRENT) USE OF ANTICOAGULANTS: ICD-10-CM

## 2024-01-01 DIAGNOSIS — E43 UNSPECIFIED SEVERE PROTEIN-CALORIE MALNUTRITION: ICD-10-CM

## 2024-01-01 DIAGNOSIS — R06.02 SHORTNESS OF BREATH: ICD-10-CM

## 2024-01-01 DIAGNOSIS — Z90.710 ACQUIRED ABSENCE OF BOTH CERVIX AND UTERUS: Chronic | ICD-10-CM

## 2024-01-01 DIAGNOSIS — K92.2 GASTROINTESTINAL HEMORRHAGE, UNSPECIFIED: ICD-10-CM

## 2024-01-01 DIAGNOSIS — R33.9 RETENTION OF URINE, UNSPECIFIED: ICD-10-CM

## 2024-01-01 DIAGNOSIS — I27.20 PULMONARY HYPERTENSION, UNSPECIFIED: ICD-10-CM

## 2024-01-01 DIAGNOSIS — R60.0 LOCALIZED EDEMA: ICD-10-CM

## 2024-01-01 DIAGNOSIS — I37.1 NONRHEUMATIC PULMONARY VALVE INSUFFICIENCY: ICD-10-CM

## 2024-01-01 DIAGNOSIS — F03.90 UNSPECIFIED DEMENTIA WITHOUT BEHAVIORAL DISTURBANCE: ICD-10-CM

## 2024-01-01 DIAGNOSIS — Z29.9 ENCOUNTER FOR PROPHYLACTIC MEASURES, UNSPECIFIED: ICD-10-CM

## 2024-01-01 DIAGNOSIS — I07.1 RHEUMATIC TRICUSPID INSUFFICIENCY: ICD-10-CM

## 2024-01-01 DIAGNOSIS — I11.0 HYPERTENSIVE HEART DISEASE WITH HEART FAILURE: ICD-10-CM

## 2024-01-01 DIAGNOSIS — F05 DELIRIUM DUE TO KNOWN PHYSIOLOGICAL CONDITION: ICD-10-CM
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
## 2024-01-01 DIAGNOSIS — I10 ESSENTIAL (PRIMARY) HYPERTENSION: ICD-10-CM

## 2024-01-01 DIAGNOSIS — G93.41 METABOLIC ENCEPHALOPATHY: ICD-10-CM
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
## 2024-01-01 DIAGNOSIS — M79.672 PAIN IN LEFT FOOT: ICD-10-CM

## 2024-01-01 DIAGNOSIS — L89.152 PRESSURE ULCER OF SACRAL REGION, STAGE 2: ICD-10-CM

## 2024-01-01 DIAGNOSIS — J69.0 PNEUMONITIS DUE TO INHALATION OF FOOD AND VOMIT: ICD-10-CM

## 2024-01-01 DIAGNOSIS — I48.91 UNSPECIFIED ATRIAL FIBRILLATION: ICD-10-CM

## 2024-01-01 DIAGNOSIS — M79.671 PAIN IN RIGHT FOOT: ICD-10-CM

## 2024-01-01 DIAGNOSIS — S72.145A NONDISPLACED INTERTROCHANTERIC FRACTURE OF LEFT FEMUR, INITIAL ENCOUNTER FOR CLOSED FRACTURE: ICD-10-CM

## 2024-01-01 LAB
24R-OH-CALCIDIOL SERPL-MCNC: 23.6 NG/ML — LOW (ref 30–80)
ADD ON TEST-SPECIMEN IN LAB: SIGNIFICANT CHANGE UP
ALBUMIN SERPL ELPH-MCNC: 2.8 G/DL — LOW (ref 3.3–5)
ALBUMIN SERPL ELPH-MCNC: 3 G/DL — LOW (ref 3.3–5)
ALBUMIN SERPL ELPH-MCNC: 3.1 G/DL — LOW (ref 3.3–5)
ALBUMIN SERPL ELPH-MCNC: 3.1 G/DL — LOW (ref 3.3–5)
ALBUMIN SERPL ELPH-MCNC: 3.2 G/DL — LOW (ref 3.3–5)
ALBUMIN SERPL ELPH-MCNC: 3.2 G/DL — LOW (ref 3.3–5)
ALBUMIN SERPL ELPH-MCNC: 3.3 G/DL — SIGNIFICANT CHANGE UP (ref 3.3–5)
ALBUMIN SERPL ELPH-MCNC: 3.5 G/DL — SIGNIFICANT CHANGE UP (ref 3.3–5)
ALBUMIN SERPL ELPH-MCNC: 3.6 G/DL — SIGNIFICANT CHANGE UP (ref 3.3–5)
ALBUMIN SERPL ELPH-MCNC: 3.7 G/DL — SIGNIFICANT CHANGE UP (ref 3.3–5)
ALBUMIN SERPL ELPH-MCNC: 3.9 G/DL — SIGNIFICANT CHANGE UP (ref 3.3–5)
ALP SERPL-CCNC: 103 U/L — SIGNIFICANT CHANGE UP (ref 40–120)
ALP SERPL-CCNC: 113 U/L — SIGNIFICANT CHANGE UP (ref 40–120)
ALP SERPL-CCNC: 115 U/L — SIGNIFICANT CHANGE UP (ref 40–120)
ALP SERPL-CCNC: 117 U/L — SIGNIFICANT CHANGE UP (ref 40–120)
ALP SERPL-CCNC: 120 U/L — SIGNIFICANT CHANGE UP (ref 40–120)
ALP SERPL-CCNC: 121 U/L — HIGH (ref 40–120)
ALP SERPL-CCNC: 122 U/L — HIGH (ref 40–120)
ALP SERPL-CCNC: 123 U/L — HIGH (ref 40–120)
ALP SERPL-CCNC: 128 U/L — HIGH (ref 40–120)
ALP SERPL-CCNC: 135 U/L — HIGH (ref 40–120)
ALP SERPL-CCNC: 150 U/L — HIGH (ref 40–120)
ALT FLD-CCNC: 20 U/L — SIGNIFICANT CHANGE UP (ref 10–45)
ALT FLD-CCNC: 20 U/L — SIGNIFICANT CHANGE UP (ref 10–45)
ALT FLD-CCNC: 21 U/L — SIGNIFICANT CHANGE UP (ref 10–45)
ALT FLD-CCNC: 22 U/L — SIGNIFICANT CHANGE UP (ref 10–45)
ALT FLD-CCNC: 23 U/L — SIGNIFICANT CHANGE UP (ref 10–45)
ALT FLD-CCNC: 30 U/L — SIGNIFICANT CHANGE UP (ref 10–45)
ALT FLD-CCNC: 30 U/L — SIGNIFICANT CHANGE UP (ref 10–45)
ALT FLD-CCNC: 34 U/L — SIGNIFICANT CHANGE UP (ref 10–45)
ALT FLD-CCNC: 34 U/L — SIGNIFICANT CHANGE UP (ref 10–45)
ALT FLD-CCNC: 35 U/L — SIGNIFICANT CHANGE UP (ref 10–45)
ALT FLD-CCNC: 36 U/L — SIGNIFICANT CHANGE UP (ref 10–45)
ANION GAP SERPL CALC-SCNC: 10 MMOL/L — SIGNIFICANT CHANGE UP (ref 5–17)
ANION GAP SERPL CALC-SCNC: 11 MMOL/L — SIGNIFICANT CHANGE UP (ref 5–17)
ANION GAP SERPL CALC-SCNC: 12 MMOL/L — SIGNIFICANT CHANGE UP (ref 5–17)
ANION GAP SERPL CALC-SCNC: 12 MMOL/L — SIGNIFICANT CHANGE UP (ref 5–17)
ANION GAP SERPL CALC-SCNC: 13 MMOL/L — SIGNIFICANT CHANGE UP (ref 5–17)
ANION GAP SERPL CALC-SCNC: 14 MMOL/L — SIGNIFICANT CHANGE UP (ref 5–17)
ANION GAP SERPL CALC-SCNC: 17 MMOL/L — SIGNIFICANT CHANGE UP (ref 5–17)
ANISOCYTOSIS BLD QL: SLIGHT — SIGNIFICANT CHANGE UP
APPEARANCE UR: CLEAR — SIGNIFICANT CHANGE UP
APPEARANCE UR: CLEAR — SIGNIFICANT CHANGE UP
APTT BLD: 33.3 SEC — SIGNIFICANT CHANGE UP (ref 24.5–35.6)
APTT BLD: 34 SEC — SIGNIFICANT CHANGE UP (ref 24.5–35.6)
APTT BLD: 35.4 SEC — SIGNIFICANT CHANGE UP (ref 24.5–35.6)
AST SERPL-CCNC: 35 U/L — SIGNIFICANT CHANGE UP (ref 10–40)
AST SERPL-CCNC: 36 U/L — SIGNIFICANT CHANGE UP (ref 10–40)
AST SERPL-CCNC: 40 U/L — SIGNIFICANT CHANGE UP (ref 10–40)
AST SERPL-CCNC: 47 U/L — HIGH (ref 10–40)
AST SERPL-CCNC: 51 U/L — HIGH (ref 10–40)
AST SERPL-CCNC: 60 U/L — HIGH (ref 10–40)
AST SERPL-CCNC: 62 U/L — HIGH (ref 10–40)
AST SERPL-CCNC: 67 U/L — HIGH (ref 10–40)
AST SERPL-CCNC: 70 U/L — HIGH (ref 10–40)
AST SERPL-CCNC: 72 U/L — HIGH (ref 10–40)
AST SERPL-CCNC: 79 U/L — HIGH (ref 10–40)
BACTERIA # UR AUTO: ABNORMAL /HPF
BACTERIA # UR AUTO: NEGATIVE /HPF — SIGNIFICANT CHANGE UP
BASOPHILS # BLD AUTO: 0 K/UL — SIGNIFICANT CHANGE UP (ref 0–0.2)
BASOPHILS # BLD AUTO: 0.03 K/UL — SIGNIFICANT CHANGE UP (ref 0–0.2)
BASOPHILS # BLD AUTO: 0.03 K/UL — SIGNIFICANT CHANGE UP (ref 0–0.2)
BASOPHILS # BLD AUTO: 0.04 K/UL — SIGNIFICANT CHANGE UP (ref 0–0.2)
BASOPHILS # BLD AUTO: 0.04 K/UL — SIGNIFICANT CHANGE UP (ref 0–0.2)
BASOPHILS # BLD AUTO: 0.05 K/UL — SIGNIFICANT CHANGE UP (ref 0–0.2)
BASOPHILS # BLD AUTO: 0.06 K/UL — SIGNIFICANT CHANGE UP (ref 0–0.2)
BASOPHILS # BLD AUTO: 0.07 K/UL — SIGNIFICANT CHANGE UP (ref 0–0.2)
BASOPHILS NFR BLD AUTO: 0 % — SIGNIFICANT CHANGE UP (ref 0–2)
BASOPHILS NFR BLD AUTO: 0.5 % — SIGNIFICANT CHANGE UP (ref 0–2)
BASOPHILS NFR BLD AUTO: 0.6 % — SIGNIFICANT CHANGE UP (ref 0–2)
BASOPHILS NFR BLD AUTO: 0.7 % — SIGNIFICANT CHANGE UP (ref 0–2)
BASOPHILS NFR BLD AUTO: 0.8 % — SIGNIFICANT CHANGE UP (ref 0–2)
BASOPHILS NFR BLD AUTO: 1 % — SIGNIFICANT CHANGE UP (ref 0–2)
BILIRUB SERPL-MCNC: 0.6 MG/DL — SIGNIFICANT CHANGE UP (ref 0.2–1.2)
BILIRUB SERPL-MCNC: 0.6 MG/DL — SIGNIFICANT CHANGE UP (ref 0.2–1.2)
BILIRUB SERPL-MCNC: 0.8 MG/DL — SIGNIFICANT CHANGE UP (ref 0.2–1.2)
BILIRUB SERPL-MCNC: 0.9 MG/DL — SIGNIFICANT CHANGE UP (ref 0.2–1.2)
BILIRUB SERPL-MCNC: 1 MG/DL — SIGNIFICANT CHANGE UP (ref 0.2–1.2)
BILIRUB SERPL-MCNC: 1 MG/DL — SIGNIFICANT CHANGE UP (ref 0.2–1.2)
BILIRUB SERPL-MCNC: 1.2 MG/DL — SIGNIFICANT CHANGE UP (ref 0.2–1.2)
BILIRUB SERPL-MCNC: 1.2 MG/DL — SIGNIFICANT CHANGE UP (ref 0.2–1.2)
BILIRUB SERPL-MCNC: 1.5 MG/DL — HIGH (ref 0.2–1.2)
BILIRUB UR-MCNC: NEGATIVE — SIGNIFICANT CHANGE UP
BILIRUB UR-MCNC: NEGATIVE — SIGNIFICANT CHANGE UP
BLD GP AB SCN SERPL QL: NEGATIVE — SIGNIFICANT CHANGE UP
BUN SERPL-MCNC: 22 MG/DL — SIGNIFICANT CHANGE UP (ref 7–23)
BUN SERPL-MCNC: 24 MG/DL — HIGH (ref 7–23)
BUN SERPL-MCNC: 24 MG/DL — HIGH (ref 7–23)
BUN SERPL-MCNC: 25 MG/DL — HIGH (ref 7–23)
BUN SERPL-MCNC: 26 MG/DL — HIGH (ref 7–23)
BUN SERPL-MCNC: 26 MG/DL — HIGH (ref 7–23)
BUN SERPL-MCNC: 27 MG/DL — HIGH (ref 7–23)
BUN SERPL-MCNC: 28 MG/DL — HIGH (ref 7–23)
BUN SERPL-MCNC: 32 MG/DL — HIGH (ref 7–23)
BUN SERPL-MCNC: 33 MG/DL — HIGH (ref 7–23)
BUN SERPL-MCNC: 36 MG/DL — HIGH (ref 7–23)
BUN SERPL-MCNC: 38 MG/DL — HIGH (ref 7–23)
BUN SERPL-MCNC: 40 MG/DL — HIGH (ref 7–23)
BURR CELLS BLD QL SMEAR: PRESENT — SIGNIFICANT CHANGE UP
CALCIUM SERPL-MCNC: 10 MG/DL — SIGNIFICANT CHANGE UP (ref 8.4–10.5)
CALCIUM SERPL-MCNC: 10.1 MG/DL — SIGNIFICANT CHANGE UP (ref 8.4–10.5)
CALCIUM SERPL-MCNC: 8.7 MG/DL — SIGNIFICANT CHANGE UP (ref 8.4–10.5)
CALCIUM SERPL-MCNC: 8.9 MG/DL — SIGNIFICANT CHANGE UP (ref 8.4–10.5)
CALCIUM SERPL-MCNC: 8.9 MG/DL — SIGNIFICANT CHANGE UP (ref 8.4–10.5)
CALCIUM SERPL-MCNC: 9 MG/DL — SIGNIFICANT CHANGE UP (ref 8.4–10.5)
CALCIUM SERPL-MCNC: 9 MG/DL — SIGNIFICANT CHANGE UP (ref 8.4–10.5)
CALCIUM SERPL-MCNC: 9.1 MG/DL — SIGNIFICANT CHANGE UP (ref 8.4–10.5)
CALCIUM SERPL-MCNC: 9.2 MG/DL — SIGNIFICANT CHANGE UP (ref 8.4–10.5)
CALCIUM SERPL-MCNC: 9.3 MG/DL — SIGNIFICANT CHANGE UP (ref 8.4–10.5)
CALCIUM SERPL-MCNC: 9.4 MG/DL — SIGNIFICANT CHANGE UP (ref 8.4–10.5)
CALCIUM SERPL-MCNC: 9.5 MG/DL — SIGNIFICANT CHANGE UP (ref 8.4–10.5)
CALCIUM SERPL-MCNC: 9.8 MG/DL — SIGNIFICANT CHANGE UP (ref 8.4–10.5)
CAST: 2 /LPF — SIGNIFICANT CHANGE UP (ref 0–4)
CAST: 2 /LPF — SIGNIFICANT CHANGE UP (ref 0–4)
CHLORIDE SERPL-SCNC: 100 MMOL/L — SIGNIFICANT CHANGE UP (ref 96–108)
CHLORIDE SERPL-SCNC: 101 MMOL/L — SIGNIFICANT CHANGE UP (ref 96–108)
CHLORIDE SERPL-SCNC: 101 MMOL/L — SIGNIFICANT CHANGE UP (ref 96–108)
CHLORIDE SERPL-SCNC: 102 MMOL/L — SIGNIFICANT CHANGE UP (ref 96–108)
CHLORIDE SERPL-SCNC: 103 MMOL/L — SIGNIFICANT CHANGE UP (ref 96–108)
CHLORIDE SERPL-SCNC: 105 MMOL/L — SIGNIFICANT CHANGE UP (ref 96–108)
CHLORIDE SERPL-SCNC: 106 MMOL/L — SIGNIFICANT CHANGE UP (ref 96–108)
CHLORIDE SERPL-SCNC: 107 MMOL/L — SIGNIFICANT CHANGE UP (ref 96–108)
CO2 SERPL-SCNC: 20 MMOL/L — LOW (ref 22–31)
CO2 SERPL-SCNC: 20 MMOL/L — LOW (ref 22–31)
CO2 SERPL-SCNC: 21 MMOL/L — LOW (ref 22–31)
CO2 SERPL-SCNC: 23 MMOL/L — SIGNIFICANT CHANGE UP (ref 22–31)
CO2 SERPL-SCNC: 24 MMOL/L — SIGNIFICANT CHANGE UP (ref 22–31)
CO2 SERPL-SCNC: 25 MMOL/L — SIGNIFICANT CHANGE UP (ref 22–31)
CO2 SERPL-SCNC: 28 MMOL/L — SIGNIFICANT CHANGE UP (ref 22–31)
COLOR SPEC: SIGNIFICANT CHANGE UP
COLOR SPEC: YELLOW — SIGNIFICANT CHANGE UP
CREAT SERPL-MCNC: 0.97 MG/DL — SIGNIFICANT CHANGE UP (ref 0.5–1.3)
CREAT SERPL-MCNC: 1.07 MG/DL — SIGNIFICANT CHANGE UP (ref 0.5–1.3)
CREAT SERPL-MCNC: 1.07 MG/DL — SIGNIFICANT CHANGE UP (ref 0.5–1.3)
CREAT SERPL-MCNC: 1.08 MG/DL — SIGNIFICANT CHANGE UP (ref 0.5–1.3)
CREAT SERPL-MCNC: 1.09 MG/DL — SIGNIFICANT CHANGE UP (ref 0.5–1.3)
CREAT SERPL-MCNC: 1.09 MG/DL — SIGNIFICANT CHANGE UP (ref 0.5–1.3)
CREAT SERPL-MCNC: 1.1 MG/DL — SIGNIFICANT CHANGE UP (ref 0.5–1.3)
CREAT SERPL-MCNC: 1.11 MG/DL — SIGNIFICANT CHANGE UP (ref 0.5–1.3)
CREAT SERPL-MCNC: 1.17 MG/DL — SIGNIFICANT CHANGE UP (ref 0.5–1.3)
CREAT SERPL-MCNC: 1.19 MG/DL — SIGNIFICANT CHANGE UP (ref 0.5–1.3)
CREAT SERPL-MCNC: 1.27 MG/DL — SIGNIFICANT CHANGE UP (ref 0.5–1.3)
CREAT SERPL-MCNC: 1.28 MG/DL — SIGNIFICANT CHANGE UP (ref 0.5–1.3)
CREAT SERPL-MCNC: 1.33 MG/DL — HIGH (ref 0.5–1.3)
CREAT SERPL-MCNC: 1.42 MG/DL — HIGH (ref 0.5–1.3)
CREAT SERPL-MCNC: 1.47 MG/DL — HIGH (ref 0.5–1.3)
CULTURE RESULTS: SIGNIFICANT CHANGE UP
CULTURE RESULTS: SIGNIFICANT CHANGE UP
DIFF PNL FLD: ABNORMAL
DIFF PNL FLD: NEGATIVE — SIGNIFICANT CHANGE UP
EGFR: 32 ML/MIN/1.73M2 — LOW
EGFR: 34 ML/MIN/1.73M2 — LOW
EGFR: 37 ML/MIN/1.73M2 — LOW
EGFR: 38 ML/MIN/1.73M2 — LOW
EGFR: 39 ML/MIN/1.73M2 — LOW
EGFR: 42 ML/MIN/1.73M2 — LOW
EGFR: 43 ML/MIN/1.73M2 — LOW
EGFR: 45 ML/MIN/1.73M2 — LOW
EGFR: 46 ML/MIN/1.73M2 — LOW
EGFR: 47 ML/MIN/1.73M2 — LOW
EGFR: 48 ML/MIN/1.73M2 — LOW
EGFR: 48 ML/MIN/1.73M2 — LOW
EGFR: 53 ML/MIN/1.73M2 — LOW
EOSINOPHIL # BLD AUTO: 0 K/UL — SIGNIFICANT CHANGE UP (ref 0–0.5)
EOSINOPHIL # BLD AUTO: 0.02 K/UL — SIGNIFICANT CHANGE UP (ref 0–0.5)
EOSINOPHIL # BLD AUTO: 0.02 K/UL — SIGNIFICANT CHANGE UP (ref 0–0.5)
EOSINOPHIL # BLD AUTO: 0.13 K/UL — SIGNIFICANT CHANGE UP (ref 0–0.5)
EOSINOPHIL # BLD AUTO: 0.13 K/UL — SIGNIFICANT CHANGE UP (ref 0–0.5)
EOSINOPHIL # BLD AUTO: 0.14 K/UL — SIGNIFICANT CHANGE UP (ref 0–0.5)
EOSINOPHIL # BLD AUTO: 0.18 K/UL — SIGNIFICANT CHANGE UP (ref 0–0.5)
EOSINOPHIL # BLD AUTO: 0.37 K/UL — SIGNIFICANT CHANGE UP (ref 0–0.5)
EOSINOPHIL NFR BLD AUTO: 0 % — SIGNIFICANT CHANGE UP (ref 0–6)
EOSINOPHIL NFR BLD AUTO: 0.2 % — SIGNIFICANT CHANGE UP (ref 0–6)
EOSINOPHIL NFR BLD AUTO: 0.3 % — SIGNIFICANT CHANGE UP (ref 0–6)
EOSINOPHIL NFR BLD AUTO: 1.8 % — SIGNIFICANT CHANGE UP (ref 0–6)
EOSINOPHIL NFR BLD AUTO: 1.9 % — SIGNIFICANT CHANGE UP (ref 0–6)
EOSINOPHIL NFR BLD AUTO: 2.1 % — SIGNIFICANT CHANGE UP (ref 0–6)
EOSINOPHIL NFR BLD AUTO: 3.7 % — SIGNIFICANT CHANGE UP (ref 0–6)
EOSINOPHIL NFR BLD AUTO: 4.6 % — SIGNIFICANT CHANGE UP (ref 0–6)
FLUAV AG NPH QL: SIGNIFICANT CHANGE UP
FLUBV AG NPH QL: SIGNIFICANT CHANGE UP
GIANT PLATELETS BLD QL SMEAR: PRESENT — SIGNIFICANT CHANGE UP
GLUCOSE SERPL-MCNC: 100 MG/DL — HIGH (ref 70–99)
GLUCOSE SERPL-MCNC: 101 MG/DL — HIGH (ref 70–99)
GLUCOSE SERPL-MCNC: 103 MG/DL — HIGH (ref 70–99)
GLUCOSE SERPL-MCNC: 103 MG/DL — HIGH (ref 70–99)
GLUCOSE SERPL-MCNC: 104 MG/DL — HIGH (ref 70–99)
GLUCOSE SERPL-MCNC: 107 MG/DL — HIGH (ref 70–99)
GLUCOSE SERPL-MCNC: 115 MG/DL — HIGH (ref 70–99)
GLUCOSE SERPL-MCNC: 132 MG/DL — HIGH (ref 70–99)
GLUCOSE SERPL-MCNC: 71 MG/DL — SIGNIFICANT CHANGE UP (ref 70–99)
GLUCOSE SERPL-MCNC: 75 MG/DL — SIGNIFICANT CHANGE UP (ref 70–99)
GLUCOSE SERPL-MCNC: 78 MG/DL — SIGNIFICANT CHANGE UP (ref 70–99)
GLUCOSE SERPL-MCNC: 84 MG/DL — SIGNIFICANT CHANGE UP (ref 70–99)
GLUCOSE SERPL-MCNC: 94 MG/DL — SIGNIFICANT CHANGE UP (ref 70–99)
GLUCOSE SERPL-MCNC: 95 MG/DL — SIGNIFICANT CHANGE UP (ref 70–99)
GLUCOSE SERPL-MCNC: 97 MG/DL — SIGNIFICANT CHANGE UP (ref 70–99)
GLUCOSE UR QL: NEGATIVE MG/DL — SIGNIFICANT CHANGE UP
GLUCOSE UR QL: NEGATIVE MG/DL — SIGNIFICANT CHANGE UP
HCT VFR BLD CALC: 42.4 % — SIGNIFICANT CHANGE UP (ref 34.5–45)
HCT VFR BLD CALC: 42.6 % — SIGNIFICANT CHANGE UP (ref 34.5–45)
HCT VFR BLD CALC: 43.5 % — SIGNIFICANT CHANGE UP (ref 34.5–45)
HCT VFR BLD CALC: 43.7 % — SIGNIFICANT CHANGE UP (ref 34.5–45)
HCT VFR BLD CALC: 44.7 % — SIGNIFICANT CHANGE UP (ref 34.5–45)
HCT VFR BLD CALC: 44.9 % — SIGNIFICANT CHANGE UP (ref 34.5–45)
HCT VFR BLD CALC: 46.5 % — HIGH (ref 34.5–45)
HCT VFR BLD CALC: 46.6 % — HIGH (ref 34.5–45)
HCT VFR BLD CALC: 47.9 % — HIGH (ref 34.5–45)
HCT VFR BLD CALC: 49.1 % — HIGH (ref 34.5–45)
HCT VFR BLD CALC: 49.5 % — HIGH (ref 34.5–45)
HGB BLD-MCNC: 13.9 G/DL — SIGNIFICANT CHANGE UP (ref 11.5–15.5)
HGB BLD-MCNC: 13.9 G/DL — SIGNIFICANT CHANGE UP (ref 11.5–15.5)
HGB BLD-MCNC: 14.2 G/DL — SIGNIFICANT CHANGE UP (ref 11.5–15.5)
HGB BLD-MCNC: 14.4 G/DL — SIGNIFICANT CHANGE UP (ref 11.5–15.5)
HGB BLD-MCNC: 14.7 G/DL — SIGNIFICANT CHANGE UP (ref 11.5–15.5)
HGB BLD-MCNC: 14.7 G/DL — SIGNIFICANT CHANGE UP (ref 11.5–15.5)
HGB BLD-MCNC: 15.2 G/DL — SIGNIFICANT CHANGE UP (ref 11.5–15.5)
HGB BLD-MCNC: 15.4 G/DL — SIGNIFICANT CHANGE UP (ref 11.5–15.5)
HGB BLD-MCNC: 15.8 G/DL — HIGH (ref 11.5–15.5)
HGB BLD-MCNC: 16.1 G/DL — HIGH (ref 11.5–15.5)
HGB BLD-MCNC: 16.5 G/DL — HIGH (ref 11.5–15.5)
IMM GRANULOCYTES NFR BLD AUTO: 0.1 % — SIGNIFICANT CHANGE UP (ref 0–0.9)
IMM GRANULOCYTES NFR BLD AUTO: 0.3 % — SIGNIFICANT CHANGE UP (ref 0–0.9)
IMM GRANULOCYTES NFR BLD AUTO: 0.5 % — SIGNIFICANT CHANGE UP (ref 0–0.9)
IMM GRANULOCYTES NFR BLD AUTO: 0.6 % — SIGNIFICANT CHANGE UP (ref 0–0.9)
IMM GRANULOCYTES NFR BLD AUTO: 0.9 % — SIGNIFICANT CHANGE UP (ref 0–0.9)
INR BLD: 1.18 — SIGNIFICANT CHANGE UP (ref 0.85–1.18)
INR BLD: 1.37 — HIGH (ref 0.85–1.18)
INR BLD: 1.4 — HIGH (ref 0.85–1.18)
KETONES UR-MCNC: ABNORMAL MG/DL
KETONES UR-MCNC: NEGATIVE MG/DL — SIGNIFICANT CHANGE UP
LACTATE SERPL-SCNC: 1.8 MMOL/L — SIGNIFICANT CHANGE UP (ref 0.5–2)
LACTATE SERPL-SCNC: 2 MMOL/L — SIGNIFICANT CHANGE UP (ref 0.5–2)
LACTATE SERPL-SCNC: 2.3 MMOL/L — HIGH (ref 0.5–2)
LACTATE SERPL-SCNC: 2.6 MMOL/L — HIGH (ref 0.5–2)
LACTATE SERPL-SCNC: 2.7 MMOL/L — HIGH (ref 0.5–2)
LEGIONELLA AG UR QL: NEGATIVE — SIGNIFICANT CHANGE UP
LEUKOCYTE ESTERASE UR-ACNC: ABNORMAL
LEUKOCYTE ESTERASE UR-ACNC: ABNORMAL
LYMPHOCYTES # BLD AUTO: 0.24 K/UL — LOW (ref 1–3.3)
LYMPHOCYTES # BLD AUTO: 0.69 K/UL — LOW (ref 1–3.3)
LYMPHOCYTES # BLD AUTO: 0.74 K/UL — LOW (ref 1–3.3)
LYMPHOCYTES # BLD AUTO: 0.89 K/UL — LOW (ref 1–3.3)
LYMPHOCYTES # BLD AUTO: 0.98 K/UL — LOW (ref 1–3.3)
LYMPHOCYTES # BLD AUTO: 1.06 K/UL — SIGNIFICANT CHANGE UP (ref 1–3.3)
LYMPHOCYTES # BLD AUTO: 1.08 K/UL — SIGNIFICANT CHANGE UP (ref 1–3.3)
LYMPHOCYTES # BLD AUTO: 1.12 K/UL — SIGNIFICANT CHANGE UP (ref 1–3.3)
LYMPHOCYTES # BLD AUTO: 11 % — LOW (ref 13–44)
LYMPHOCYTES # BLD AUTO: 12.3 % — LOW (ref 13–44)
LYMPHOCYTES # BLD AUTO: 12.5 % — LOW (ref 13–44)
LYMPHOCYTES # BLD AUTO: 13.1 % — SIGNIFICANT CHANGE UP (ref 13–44)
LYMPHOCYTES # BLD AUTO: 14.1 % — SIGNIFICANT CHANGE UP (ref 13–44)
LYMPHOCYTES # BLD AUTO: 15 % — SIGNIFICANT CHANGE UP (ref 13–44)
LYMPHOCYTES # BLD AUTO: 15.3 % — SIGNIFICANT CHANGE UP (ref 13–44)
LYMPHOCYTES # BLD AUTO: 3.5 % — LOW (ref 13–44)
MAGNESIUM SERPL-MCNC: 1.9 MG/DL — SIGNIFICANT CHANGE UP (ref 1.6–2.6)
MAGNESIUM SERPL-MCNC: 2 MG/DL — SIGNIFICANT CHANGE UP (ref 1.6–2.6)
MAGNESIUM SERPL-MCNC: 2.1 MG/DL — SIGNIFICANT CHANGE UP (ref 1.6–2.6)
MAGNESIUM SERPL-MCNC: 2.2 MG/DL — SIGNIFICANT CHANGE UP (ref 1.6–2.6)
MAGNESIUM SERPL-MCNC: 2.2 MG/DL — SIGNIFICANT CHANGE UP (ref 1.6–2.6)
MAGNESIUM SERPL-MCNC: 2.3 MG/DL — SIGNIFICANT CHANGE UP (ref 1.6–2.6)
MANUAL SMEAR VERIFICATION: SIGNIFICANT CHANGE UP
MCHC RBC-ENTMCNC: 30.5 PG — SIGNIFICANT CHANGE UP (ref 27–34)
MCHC RBC-ENTMCNC: 30.6 PG — SIGNIFICANT CHANGE UP (ref 27–34)
MCHC RBC-ENTMCNC: 30.8 PG — SIGNIFICANT CHANGE UP (ref 27–34)
MCHC RBC-ENTMCNC: 30.9 PG — SIGNIFICANT CHANGE UP (ref 27–34)
MCHC RBC-ENTMCNC: 30.9 PG — SIGNIFICANT CHANGE UP (ref 27–34)
MCHC RBC-ENTMCNC: 31 PG — SIGNIFICANT CHANGE UP (ref 27–34)
MCHC RBC-ENTMCNC: 31.2 PG — SIGNIFICANT CHANGE UP (ref 27–34)
MCHC RBC-ENTMCNC: 31.3 PG — SIGNIFICANT CHANGE UP (ref 27–34)
MCHC RBC-ENTMCNC: 32.5 GM/DL — SIGNIFICANT CHANGE UP (ref 32–36)
MCHC RBC-ENTMCNC: 32.6 GM/DL — SIGNIFICANT CHANGE UP (ref 32–36)
MCHC RBC-ENTMCNC: 32.6 GM/DL — SIGNIFICANT CHANGE UP (ref 32–36)
MCHC RBC-ENTMCNC: 32.7 GM/DL — SIGNIFICANT CHANGE UP (ref 32–36)
MCHC RBC-ENTMCNC: 32.7 GM/DL — SIGNIFICANT CHANGE UP (ref 32–36)
MCHC RBC-ENTMCNC: 32.8 GM/DL — SIGNIFICANT CHANGE UP (ref 32–36)
MCHC RBC-ENTMCNC: 32.9 GM/DL — SIGNIFICANT CHANGE UP (ref 32–36)
MCHC RBC-ENTMCNC: 33 GM/DL — SIGNIFICANT CHANGE UP (ref 32–36)
MCHC RBC-ENTMCNC: 33.6 GM/DL — SIGNIFICANT CHANGE UP (ref 32–36)
MCV RBC AUTO: 92.5 FL — SIGNIFICANT CHANGE UP (ref 80–100)
MCV RBC AUTO: 93 FL — SIGNIFICANT CHANGE UP (ref 80–100)
MCV RBC AUTO: 93.2 FL — SIGNIFICANT CHANGE UP (ref 80–100)
MCV RBC AUTO: 93.6 FL — SIGNIFICANT CHANGE UP (ref 80–100)
MCV RBC AUTO: 93.9 FL — SIGNIFICANT CHANGE UP (ref 80–100)
MCV RBC AUTO: 94 FL — SIGNIFICANT CHANGE UP (ref 80–100)
MCV RBC AUTO: 94.5 FL — SIGNIFICANT CHANGE UP (ref 80–100)
MCV RBC AUTO: 94.7 FL — SIGNIFICANT CHANGE UP (ref 80–100)
MCV RBC AUTO: 95 FL — SIGNIFICANT CHANGE UP (ref 80–100)
MCV RBC AUTO: 95.2 FL — SIGNIFICANT CHANGE UP (ref 80–100)
MCV RBC AUTO: 95.5 FL — SIGNIFICANT CHANGE UP (ref 80–100)
MONOCYTES # BLD AUTO: 0.11 K/UL — SIGNIFICANT CHANGE UP (ref 0–0.9)
MONOCYTES # BLD AUTO: 0.4 K/UL — SIGNIFICANT CHANGE UP (ref 0–0.9)
MONOCYTES # BLD AUTO: 0.41 K/UL — SIGNIFICANT CHANGE UP (ref 0–0.9)
MONOCYTES # BLD AUTO: 0.42 K/UL — SIGNIFICANT CHANGE UP (ref 0–0.9)
MONOCYTES # BLD AUTO: 0.45 K/UL — SIGNIFICANT CHANGE UP (ref 0–0.9)
MONOCYTES # BLD AUTO: 0.47 K/UL — SIGNIFICANT CHANGE UP (ref 0–0.9)
MONOCYTES # BLD AUTO: 0.49 K/UL — SIGNIFICANT CHANGE UP (ref 0–0.9)
MONOCYTES # BLD AUTO: 0.5 K/UL — SIGNIFICANT CHANGE UP (ref 0–0.9)
MONOCYTES NFR BLD AUTO: 1.7 % — LOW (ref 2–14)
MONOCYTES NFR BLD AUTO: 5.2 % — SIGNIFICANT CHANGE UP (ref 2–14)
MONOCYTES NFR BLD AUTO: 5.6 % — SIGNIFICANT CHANGE UP (ref 2–14)
MONOCYTES NFR BLD AUTO: 5.9 % — SIGNIFICANT CHANGE UP (ref 2–14)
MONOCYTES NFR BLD AUTO: 6.7 % — SIGNIFICANT CHANGE UP (ref 2–14)
MONOCYTES NFR BLD AUTO: 6.7 % — SIGNIFICANT CHANGE UP (ref 2–14)
MONOCYTES NFR BLD AUTO: 6.8 % — SIGNIFICANT CHANGE UP (ref 2–14)
MONOCYTES NFR BLD AUTO: 8.5 % — SIGNIFICANT CHANGE UP (ref 2–14)
MRSA PCR RESULT.: NEGATIVE — SIGNIFICANT CHANGE UP
NEUTROPHILS # BLD AUTO: 3.45 K/UL — SIGNIFICANT CHANGE UP (ref 1.8–7.4)
NEUTROPHILS # BLD AUTO: 4.98 K/UL — SIGNIFICANT CHANGE UP (ref 1.8–7.4)
NEUTROPHILS # BLD AUTO: 5.45 K/UL — SIGNIFICANT CHANGE UP (ref 1.8–7.4)
NEUTROPHILS # BLD AUTO: 5.7 K/UL — SIGNIFICANT CHANGE UP (ref 1.8–7.4)
NEUTROPHILS # BLD AUTO: 5.71 K/UL — SIGNIFICANT CHANGE UP (ref 1.8–7.4)
NEUTROPHILS # BLD AUTO: 6.08 K/UL — SIGNIFICANT CHANGE UP (ref 1.8–7.4)
NEUTROPHILS # BLD AUTO: 6.33 K/UL — SIGNIFICANT CHANGE UP (ref 1.8–7.4)
NEUTROPHILS # BLD AUTO: 6.91 K/UL — SIGNIFICANT CHANGE UP (ref 1.8–7.4)
NEUTROPHILS NFR BLD AUTO: 71.3 % — SIGNIFICANT CHANGE UP (ref 43–77)
NEUTROPHILS NFR BLD AUTO: 75.4 % — SIGNIFICANT CHANGE UP (ref 43–77)
NEUTROPHILS NFR BLD AUTO: 75.9 % — SIGNIFICANT CHANGE UP (ref 43–77)
NEUTROPHILS NFR BLD AUTO: 76.1 % — SIGNIFICANT CHANGE UP (ref 43–77)
NEUTROPHILS NFR BLD AUTO: 79.4 % — HIGH (ref 43–77)
NEUTROPHILS NFR BLD AUTO: 80.5 % — HIGH (ref 43–77)
NEUTROPHILS NFR BLD AUTO: 80.5 % — HIGH (ref 43–77)
NEUTROPHILS NFR BLD AUTO: 93.9 % — HIGH (ref 43–77)
NITRITE UR-MCNC: NEGATIVE — SIGNIFICANT CHANGE UP
NITRITE UR-MCNC: NEGATIVE — SIGNIFICANT CHANGE UP
NRBC # BLD: 0 /100 WBCS — SIGNIFICANT CHANGE UP (ref 0–0)
NT-PROBNP SERPL-SCNC: 4699 PG/ML — HIGH (ref 0–300)
PH UR: 5.5 — SIGNIFICANT CHANGE UP (ref 5–8)
PH UR: 6.5 — SIGNIFICANT CHANGE UP (ref 5–8)
PHOSPHATE SERPL-MCNC: 2.9 MG/DL — SIGNIFICANT CHANGE UP (ref 2.5–4.5)
PHOSPHATE SERPL-MCNC: 3.3 MG/DL — SIGNIFICANT CHANGE UP (ref 2.5–4.5)
PHOSPHATE SERPL-MCNC: 3.4 MG/DL — SIGNIFICANT CHANGE UP (ref 2.5–4.5)
PHOSPHATE SERPL-MCNC: 3.6 MG/DL — SIGNIFICANT CHANGE UP (ref 2.5–4.5)
PHOSPHATE SERPL-MCNC: 3.6 MG/DL — SIGNIFICANT CHANGE UP (ref 2.5–4.5)
PHOSPHATE SERPL-MCNC: 4.2 MG/DL — SIGNIFICANT CHANGE UP (ref 2.5–4.5)
PHOSPHATE SERPL-MCNC: 4.4 MG/DL — SIGNIFICANT CHANGE UP (ref 2.5–4.5)
PLAT MORPH BLD: NORMAL — SIGNIFICANT CHANGE UP
PLATELET # BLD AUTO: 123 K/UL — LOW (ref 150–400)
PLATELET # BLD AUTO: 147 K/UL — LOW (ref 150–400)
PLATELET # BLD AUTO: 148 K/UL — LOW (ref 150–400)
PLATELET # BLD AUTO: 150 K/UL — SIGNIFICANT CHANGE UP (ref 150–400)
PLATELET # BLD AUTO: 150 K/UL — SIGNIFICANT CHANGE UP (ref 150–400)
PLATELET # BLD AUTO: 155 K/UL — SIGNIFICANT CHANGE UP (ref 150–400)
PLATELET # BLD AUTO: 165 K/UL — SIGNIFICANT CHANGE UP (ref 150–400)
PLATELET # BLD AUTO: 181 K/UL — SIGNIFICANT CHANGE UP (ref 150–400)
PLATELET # BLD AUTO: 200 K/UL — SIGNIFICANT CHANGE UP (ref 150–400)
PLATELET # BLD AUTO: 203 K/UL — SIGNIFICANT CHANGE UP (ref 150–400)
PLATELET # BLD AUTO: 235 K/UL — SIGNIFICANT CHANGE UP (ref 150–400)
POIKILOCYTOSIS BLD QL AUTO: SLIGHT — SIGNIFICANT CHANGE UP
POLYCHROMASIA BLD QL SMEAR: SLIGHT — SIGNIFICANT CHANGE UP
POTASSIUM SERPL-MCNC: 3.8 MMOL/L — SIGNIFICANT CHANGE UP (ref 3.5–5.3)
POTASSIUM SERPL-MCNC: 3.9 MMOL/L — SIGNIFICANT CHANGE UP (ref 3.5–5.3)
POTASSIUM SERPL-MCNC: 4 MMOL/L — SIGNIFICANT CHANGE UP (ref 3.5–5.3)
POTASSIUM SERPL-MCNC: 4.1 MMOL/L — SIGNIFICANT CHANGE UP (ref 3.5–5.3)
POTASSIUM SERPL-MCNC: 4.1 MMOL/L — SIGNIFICANT CHANGE UP (ref 3.5–5.3)
POTASSIUM SERPL-MCNC: 4.2 MMOL/L — SIGNIFICANT CHANGE UP (ref 3.5–5.3)
POTASSIUM SERPL-MCNC: 4.2 MMOL/L — SIGNIFICANT CHANGE UP (ref 3.5–5.3)
POTASSIUM SERPL-MCNC: 4.3 MMOL/L — SIGNIFICANT CHANGE UP (ref 3.5–5.3)
POTASSIUM SERPL-MCNC: 4.6 MMOL/L — SIGNIFICANT CHANGE UP (ref 3.5–5.3)
POTASSIUM SERPL-MCNC: 4.6 MMOL/L — SIGNIFICANT CHANGE UP (ref 3.5–5.3)
POTASSIUM SERPL-MCNC: 4.7 MMOL/L — SIGNIFICANT CHANGE UP (ref 3.5–5.3)
POTASSIUM SERPL-SCNC: 3.8 MMOL/L — SIGNIFICANT CHANGE UP (ref 3.5–5.3)
POTASSIUM SERPL-SCNC: 3.9 MMOL/L — SIGNIFICANT CHANGE UP (ref 3.5–5.3)
POTASSIUM SERPL-SCNC: 4 MMOL/L — SIGNIFICANT CHANGE UP (ref 3.5–5.3)
POTASSIUM SERPL-SCNC: 4.1 MMOL/L — SIGNIFICANT CHANGE UP (ref 3.5–5.3)
POTASSIUM SERPL-SCNC: 4.1 MMOL/L — SIGNIFICANT CHANGE UP (ref 3.5–5.3)
POTASSIUM SERPL-SCNC: 4.2 MMOL/L — SIGNIFICANT CHANGE UP (ref 3.5–5.3)
POTASSIUM SERPL-SCNC: 4.2 MMOL/L — SIGNIFICANT CHANGE UP (ref 3.5–5.3)
POTASSIUM SERPL-SCNC: 4.3 MMOL/L — SIGNIFICANT CHANGE UP (ref 3.5–5.3)
POTASSIUM SERPL-SCNC: 4.6 MMOL/L — SIGNIFICANT CHANGE UP (ref 3.5–5.3)
POTASSIUM SERPL-SCNC: 4.6 MMOL/L — SIGNIFICANT CHANGE UP (ref 3.5–5.3)
POTASSIUM SERPL-SCNC: 4.7 MMOL/L — SIGNIFICANT CHANGE UP (ref 3.5–5.3)
PROCALCITONIN SERPL-MCNC: 0.19 NG/ML — HIGH (ref 0.02–0.1)
PROCALCITONIN SERPL-MCNC: 0.27 NG/ML — HIGH (ref 0.02–0.1)
PROT SERPL-MCNC: 5.5 G/DL — LOW (ref 6–8.3)
PROT SERPL-MCNC: 5.9 G/DL — LOW (ref 6–8.3)
PROT SERPL-MCNC: 6 G/DL — SIGNIFICANT CHANGE UP (ref 6–8.3)
PROT SERPL-MCNC: 6.2 G/DL — SIGNIFICANT CHANGE UP (ref 6–8.3)
PROT SERPL-MCNC: 6.4 G/DL — SIGNIFICANT CHANGE UP (ref 6–8.3)
PROT SERPL-MCNC: 6.6 G/DL — SIGNIFICANT CHANGE UP (ref 6–8.3)
PROT SERPL-MCNC: 6.8 G/DL — SIGNIFICANT CHANGE UP (ref 6–8.3)
PROT SERPL-MCNC: 7.4 G/DL — SIGNIFICANT CHANGE UP (ref 6–8.3)
PROT UR-MCNC: NEGATIVE MG/DL — SIGNIFICANT CHANGE UP
PROT UR-MCNC: SIGNIFICANT CHANGE UP MG/DL
PROTHROM AB SERPL-ACNC: 13.4 SEC — HIGH (ref 9.5–13)
PROTHROM AB SERPL-ACNC: 15.5 SEC — HIGH (ref 9.5–13)
PROTHROM AB SERPL-ACNC: 15.8 SEC — HIGH (ref 9.5–13)
PTH-INTACT FLD-MCNC: 61 PG/ML — SIGNIFICANT CHANGE UP (ref 15–65)
RAPID RVP RESULT: SIGNIFICANT CHANGE UP
RBC # BLD: 4.51 M/UL — SIGNIFICANT CHANGE UP (ref 3.8–5.2)
RBC # BLD: 4.56 M/UL — SIGNIFICANT CHANGE UP (ref 3.8–5.2)
RBC # BLD: 4.58 M/UL — SIGNIFICANT CHANGE UP (ref 3.8–5.2)
RBC # BLD: 4.65 M/UL — SIGNIFICANT CHANGE UP (ref 3.8–5.2)
RBC # BLD: 4.74 M/UL — SIGNIFICANT CHANGE UP (ref 3.8–5.2)
RBC # BLD: 4.76 M/UL — SIGNIFICANT CHANGE UP (ref 3.8–5.2)
RBC # BLD: 4.87 M/UL — SIGNIFICANT CHANGE UP (ref 3.8–5.2)
RBC # BLD: 5.04 M/UL — SIGNIFICANT CHANGE UP (ref 3.8–5.2)
RBC # BLD: 5.12 M/UL — SIGNIFICANT CHANGE UP (ref 3.8–5.2)
RBC # BLD: 5.2 M/UL — SIGNIFICANT CHANGE UP (ref 3.8–5.2)
RBC # BLD: 5.27 M/UL — HIGH (ref 3.8–5.2)
RBC # FLD: 14.9 % — HIGH (ref 10.3–14.5)
RBC # FLD: 15.1 % — HIGH (ref 10.3–14.5)
RBC # FLD: 15.2 % — HIGH (ref 10.3–14.5)
RBC # FLD: 15.3 % — HIGH (ref 10.3–14.5)
RBC # FLD: 15.3 % — HIGH (ref 10.3–14.5)
RBC # FLD: 15.8 % — HIGH (ref 10.3–14.5)
RBC # FLD: 16.1 % — HIGH (ref 10.3–14.5)
RBC # FLD: 16.4 % — HIGH (ref 10.3–14.5)
RBC # FLD: 16.5 % — HIGH (ref 10.3–14.5)
RBC # FLD: 16.6 % — HIGH (ref 10.3–14.5)
RBC # FLD: 17.2 % — HIGH (ref 10.3–14.5)
RBC BLD AUTO: ABNORMAL
RBC CASTS # UR COMP ASSIST: 2 /HPF — SIGNIFICANT CHANGE UP (ref 0–4)
RBC CASTS # UR COMP ASSIST: 6 /HPF — HIGH (ref 0–4)
RH IG SCN BLD-IMP: POSITIVE — SIGNIFICANT CHANGE UP
RSV RNA NPH QL NAA+NON-PROBE: SIGNIFICANT CHANGE UP
S AUREUS DNA NOSE QL NAA+PROBE: NEGATIVE — SIGNIFICANT CHANGE UP
S PNEUM AG UR QL: NEGATIVE — SIGNIFICANT CHANGE UP
SARS-COV-2 RNA SPEC QL NAA+PROBE: SIGNIFICANT CHANGE UP
SARS-COV-2 RNA SPEC QL NAA+PROBE: SIGNIFICANT CHANGE UP
SODIUM SERPL-SCNC: 135 MMOL/L — SIGNIFICANT CHANGE UP (ref 135–145)
SODIUM SERPL-SCNC: 137 MMOL/L — SIGNIFICANT CHANGE UP (ref 135–145)
SODIUM SERPL-SCNC: 138 MMOL/L — SIGNIFICANT CHANGE UP (ref 135–145)
SODIUM SERPL-SCNC: 138 MMOL/L — SIGNIFICANT CHANGE UP (ref 135–145)
SODIUM SERPL-SCNC: 139 MMOL/L — SIGNIFICANT CHANGE UP (ref 135–145)
SODIUM SERPL-SCNC: 141 MMOL/L — SIGNIFICANT CHANGE UP (ref 135–145)
SODIUM SERPL-SCNC: 141 MMOL/L — SIGNIFICANT CHANGE UP (ref 135–145)
SODIUM SERPL-SCNC: 142 MMOL/L — SIGNIFICANT CHANGE UP (ref 135–145)
SODIUM SERPL-SCNC: 143 MMOL/L — SIGNIFICANT CHANGE UP (ref 135–145)
SODIUM SERPL-SCNC: 143 MMOL/L — SIGNIFICANT CHANGE UP (ref 135–145)
SODIUM SERPL-SCNC: 144 MMOL/L — SIGNIFICANT CHANGE UP (ref 135–145)
SODIUM SERPL-SCNC: 146 MMOL/L — HIGH (ref 135–145)
SP GR SPEC: 1.01 — SIGNIFICANT CHANGE UP (ref 1–1.03)
SP GR SPEC: 1.03 — SIGNIFICANT CHANGE UP (ref 1–1.03)
SPECIMEN SOURCE: SIGNIFICANT CHANGE UP
SPECIMEN SOURCE: SIGNIFICANT CHANGE UP
SPHEROCYTES BLD QL SMEAR: SLIGHT — SIGNIFICANT CHANGE UP
SQUAMOUS # UR AUTO: 0 /HPF — SIGNIFICANT CHANGE UP (ref 0–5)
SQUAMOUS # UR AUTO: 1 /HPF — SIGNIFICANT CHANGE UP (ref 0–5)
TROPONIN T, HIGH SENSITIVITY RESULT: 111 NG/L — CRITICAL HIGH (ref 0–51)
TROPONIN T, HIGH SENSITIVITY RESULT: 116 NG/L — CRITICAL HIGH (ref 0–51)
UROBILINOGEN FLD QL: 0.2 MG/DL — SIGNIFICANT CHANGE UP (ref 0.2–1)
UROBILINOGEN FLD QL: 1 MG/DL — SIGNIFICANT CHANGE UP (ref 0.2–1)
VARIANT LYMPHS # BLD: 0.9 % — SIGNIFICANT CHANGE UP (ref 0–6)
WBC # BLD: 4.84 K/UL — SIGNIFICANT CHANGE UP (ref 3.8–10.5)
WBC # BLD: 6.27 K/UL — SIGNIFICANT CHANGE UP (ref 3.8–10.5)
WBC # BLD: 6.33 K/UL — SIGNIFICANT CHANGE UP (ref 3.8–10.5)
WBC # BLD: 6.74 K/UL — SIGNIFICANT CHANGE UP (ref 3.8–10.5)
WBC # BLD: 6.82 K/UL — SIGNIFICANT CHANGE UP (ref 3.8–10.5)
WBC # BLD: 7.1 K/UL — SIGNIFICANT CHANGE UP (ref 3.8–10.5)
WBC # BLD: 7.22 K/UL — SIGNIFICANT CHANGE UP (ref 3.8–10.5)
WBC # BLD: 7.51 K/UL — SIGNIFICANT CHANGE UP (ref 3.8–10.5)
WBC # BLD: 7.98 K/UL — SIGNIFICANT CHANGE UP (ref 3.8–10.5)
WBC # BLD: 8.03 K/UL — SIGNIFICANT CHANGE UP (ref 3.8–10.5)
WBC # BLD: 8.58 K/UL — SIGNIFICANT CHANGE UP (ref 3.8–10.5)
WBC # FLD AUTO: 4.84 K/UL — SIGNIFICANT CHANGE UP (ref 3.8–10.5)
WBC # FLD AUTO: 6.27 K/UL — SIGNIFICANT CHANGE UP (ref 3.8–10.5)
WBC # FLD AUTO: 6.33 K/UL — SIGNIFICANT CHANGE UP (ref 3.8–10.5)
WBC # FLD AUTO: 6.74 K/UL — SIGNIFICANT CHANGE UP (ref 3.8–10.5)
WBC # FLD AUTO: 6.82 K/UL — SIGNIFICANT CHANGE UP (ref 3.8–10.5)
WBC # FLD AUTO: 7.1 K/UL — SIGNIFICANT CHANGE UP (ref 3.8–10.5)
WBC # FLD AUTO: 7.22 K/UL — SIGNIFICANT CHANGE UP (ref 3.8–10.5)
WBC # FLD AUTO: 7.51 K/UL — SIGNIFICANT CHANGE UP (ref 3.8–10.5)
WBC # FLD AUTO: 7.98 K/UL — SIGNIFICANT CHANGE UP (ref 3.8–10.5)
WBC # FLD AUTO: 8.03 K/UL — SIGNIFICANT CHANGE UP (ref 3.8–10.5)
WBC # FLD AUTO: 8.58 K/UL — SIGNIFICANT CHANGE UP (ref 3.8–10.5)
WBC UR QL: 0 /HPF — SIGNIFICANT CHANGE UP (ref 0–5)
WBC UR QL: 1 /HPF — SIGNIFICANT CHANGE UP (ref 0–5)

## 2024-01-01 PROCEDURE — 86900 BLOOD TYPING SEROLOGIC ABO: CPT

## 2024-01-01 PROCEDURE — 36415 COLL VENOUS BLD VENIPUNCTURE: CPT

## 2024-01-01 PROCEDURE — 93005 ELECTROCARDIOGRAM TRACING: CPT

## 2024-01-01 PROCEDURE — 71275 CT ANGIOGRAPHY CHEST: CPT | Mod: 26,MC

## 2024-01-01 PROCEDURE — 99223 1ST HOSP IP/OBS HIGH 75: CPT

## 2024-01-01 PROCEDURE — 0225U NFCT DS DNA&RNA 21 SARSCOV2: CPT

## 2024-01-01 PROCEDURE — 72192 CT PELVIS W/O DYE: CPT | Mod: MA

## 2024-01-01 PROCEDURE — 82306 VITAMIN D 25 HYDROXY: CPT

## 2024-01-01 PROCEDURE — 73721 MRI JNT OF LWR EXTRE W/O DYE: CPT | Mod: 26,LT

## 2024-01-01 PROCEDURE — 99232 SBSQ HOSP IP/OBS MODERATE 35: CPT

## 2024-01-01 PROCEDURE — 83605 ASSAY OF LACTIC ACID: CPT

## 2024-01-01 PROCEDURE — 83735 ASSAY OF MAGNESIUM: CPT

## 2024-01-01 PROCEDURE — 85025 COMPLETE CBC W/AUTO DIFF WBC: CPT

## 2024-01-01 PROCEDURE — 99233 SBSQ HOSP IP/OBS HIGH 50: CPT | Mod: GC

## 2024-01-01 PROCEDURE — 87040 BLOOD CULTURE FOR BACTERIA: CPT

## 2024-01-01 PROCEDURE — 93970 EXTREMITY STUDY: CPT | Mod: 26

## 2024-01-01 PROCEDURE — 81001 URINALYSIS AUTO W/SCOPE: CPT

## 2024-01-01 PROCEDURE — 85027 COMPLETE CBC AUTOMATED: CPT

## 2024-01-01 PROCEDURE — 99233 SBSQ HOSP IP/OBS HIGH 50: CPT

## 2024-01-01 PROCEDURE — 97116 GAIT TRAINING THERAPY: CPT

## 2024-01-01 PROCEDURE — 97530 THERAPEUTIC ACTIVITIES: CPT

## 2024-01-01 PROCEDURE — 70450 CT HEAD/BRAIN W/O DYE: CPT | Mod: 26,MA

## 2024-01-01 PROCEDURE — 73721 MRI JNT OF LWR EXTRE W/O DYE: CPT

## 2024-01-01 PROCEDURE — 80048 BASIC METABOLIC PNL TOTAL CA: CPT

## 2024-01-01 PROCEDURE — 93306 TTE W/DOPPLER COMPLETE: CPT | Mod: 26

## 2024-01-01 PROCEDURE — 73502 X-RAY EXAM HIP UNI 2-3 VIEWS: CPT | Mod: 26,LT

## 2024-01-01 PROCEDURE — 83880 ASSAY OF NATRIURETIC PEPTIDE: CPT

## 2024-01-01 PROCEDURE — 71045 X-RAY EXAM CHEST 1 VIEW: CPT

## 2024-01-01 PROCEDURE — 92610 EVALUATE SWALLOWING FUNCTION: CPT

## 2024-01-01 PROCEDURE — 97164 PT RE-EVAL EST PLAN CARE: CPT

## 2024-01-01 PROCEDURE — 99239 HOSP IP/OBS DSCHRG MGMT >30: CPT

## 2024-01-01 PROCEDURE — 97161 PT EVAL LOW COMPLEX 20 MIN: CPT

## 2024-01-01 PROCEDURE — 87637 SARSCOV2&INF A&B&RSV AMP PRB: CPT

## 2024-01-01 PROCEDURE — 73700 CT LOWER EXTREMITY W/O DYE: CPT | Mod: MA

## 2024-01-01 PROCEDURE — 99285 EMERGENCY DEPT VISIT HI MDM: CPT

## 2024-01-01 PROCEDURE — 82310 ASSAY OF CALCIUM: CPT

## 2024-01-01 PROCEDURE — 71275 CT ANGIOGRAPHY CHEST: CPT | Mod: MC

## 2024-01-01 PROCEDURE — 99222 1ST HOSP IP/OBS MODERATE 55: CPT

## 2024-01-01 PROCEDURE — 80053 COMPREHEN METABOLIC PANEL: CPT

## 2024-01-01 PROCEDURE — 73552 X-RAY EXAM OF FEMUR 2/>: CPT | Mod: 26,LT

## 2024-01-01 PROCEDURE — 99221 1ST HOSP IP/OBS SF/LOW 40: CPT

## 2024-01-01 PROCEDURE — 86850 RBC ANTIBODY SCREEN: CPT

## 2024-01-01 PROCEDURE — 87449 NOS EACH ORGANISM AG IA: CPT

## 2024-01-01 PROCEDURE — 93970 EXTREMITY STUDY: CPT

## 2024-01-01 PROCEDURE — 71045 X-RAY EXAM CHEST 1 VIEW: CPT | Mod: 26

## 2024-01-01 PROCEDURE — 99214 OFFICE O/P EST MOD 30 MIN: CPT

## 2024-01-01 PROCEDURE — 72192 CT PELVIS W/O DYE: CPT | Mod: 26,MA

## 2024-01-01 PROCEDURE — 84145 PROCALCITONIN (PCT): CPT

## 2024-01-01 PROCEDURE — 70450 CT HEAD/BRAIN W/O DYE: CPT | Mod: MA

## 2024-01-01 PROCEDURE — 86901 BLOOD TYPING SEROLOGIC RH(D): CPT

## 2024-01-01 PROCEDURE — 76000 FLUOROSCOPY <1 HR PHYS/QHP: CPT

## 2024-01-01 PROCEDURE — 73502 X-RAY EXAM HIP UNI 2-3 VIEWS: CPT

## 2024-01-01 PROCEDURE — 84484 ASSAY OF TROPONIN QUANT: CPT

## 2024-01-01 PROCEDURE — 85730 THROMBOPLASTIN TIME PARTIAL: CPT

## 2024-01-01 PROCEDURE — 97165 OT EVAL LOW COMPLEX 30 MIN: CPT

## 2024-01-01 PROCEDURE — 73700 CT LOWER EXTREMITY W/O DYE: CPT | Mod: 26,LT,MA

## 2024-01-01 PROCEDURE — 73552 X-RAY EXAM OF FEMUR 2/>: CPT

## 2024-01-01 PROCEDURE — 96374 THER/PROPH/DIAG INJ IV PUSH: CPT

## 2024-01-01 PROCEDURE — 93306 TTE W/DOPPLER COMPLETE: CPT

## 2024-01-01 PROCEDURE — 93010 ELECTROCARDIOGRAM REPORT: CPT

## 2024-01-01 PROCEDURE — 85610 PROTHROMBIN TIME: CPT

## 2024-01-01 PROCEDURE — 97168 OT RE-EVAL EST PLAN CARE: CPT

## 2024-01-01 PROCEDURE — 96375 TX/PRO/DX INJ NEW DRUG ADDON: CPT

## 2024-01-01 PROCEDURE — 99223 1ST HOSP IP/OBS HIGH 75: CPT | Mod: GC,AI

## 2024-01-01 PROCEDURE — 87640 STAPH A DNA AMP PROBE: CPT

## 2024-01-01 PROCEDURE — G2211 COMPLEX E/M VISIT ADD ON: CPT

## 2024-01-01 PROCEDURE — 87641 MR-STAPH DNA AMP PROBE: CPT

## 2024-01-01 PROCEDURE — C1713: CPT

## 2024-01-01 PROCEDURE — 84100 ASSAY OF PHOSPHORUS: CPT

## 2024-01-01 PROCEDURE — 83970 ASSAY OF PARATHORMONE: CPT

## 2024-01-01 PROCEDURE — 87899 AGENT NOS ASSAY W/OPTIC: CPT

## 2024-01-01 DEVICE — STRYKER TROCHANTERIC NAIL 10MM X 170MM 125 DEGREE: Type: IMPLANTABLE DEVICE | Status: FUNCTIONAL

## 2024-01-01 DEVICE — K-WIRE STRYKER 3.2M X 450MM: Type: IMPLANTABLE DEVICE | Status: FUNCTIONAL

## 2024-01-01 DEVICE — SCREW LAG TI GAMMA3 10.5X90MM: Type: IMPLANTABLE DEVICE | Status: FUNCTIONAL

## 2024-01-01 DEVICE — SCREW LOKG 5X32.5MM: Type: IMPLANTABLE DEVICE | Status: FUNCTIONAL

## 2024-01-01 RX ORDER — HEPARIN SODIUM 5000 [USP'U]/ML
5000 INJECTION INTRAVENOUS; SUBCUTANEOUS EVERY 8 HOURS
Refills: 0 | Status: DISCONTINUED | OUTPATIENT
Start: 2024-01-01 | End: 2024-01-01

## 2024-01-01 RX ORDER — OLANZAPINE 15 MG/1
0.5 TABLET, FILM COATED ORAL
Qty: 0 | Refills: 0 | DISCHARGE
Start: 2024-01-01

## 2024-01-01 RX ORDER — APIXABAN 2.5 MG/1
2.5 TABLET, FILM COATED ORAL EVERY 12 HOURS
Refills: 0 | Status: DISCONTINUED | OUTPATIENT
Start: 2024-01-01 | End: 2024-01-01

## 2024-01-01 RX ORDER — LANOLIN ALCOHOL/MO/W.PET/CERES
3 CREAM (GRAM) TOPICAL AT BEDTIME
Refills: 0 | Status: DISCONTINUED | OUTPATIENT
Start: 2024-01-01 | End: 2024-01-01

## 2024-01-01 RX ORDER — OLANZAPINE 15 MG/1
2.5 TABLET, FILM COATED ORAL ONCE
Refills: 0 | Status: DISCONTINUED | OUTPATIENT
Start: 2024-01-01 | End: 2024-01-01

## 2024-01-01 RX ORDER — CEFTRIAXONE 500 MG/1
1000 INJECTION, POWDER, FOR SOLUTION INTRAMUSCULAR; INTRAVENOUS ONCE
Refills: 0 | Status: COMPLETED | OUTPATIENT
Start: 2024-01-01 | End: 2024-01-01

## 2024-01-01 RX ORDER — ACETAMINOPHEN 500 MG
650 TABLET ORAL EVERY 6 HOURS
Refills: 0 | Status: DISCONTINUED | OUTPATIENT
Start: 2024-01-01 | End: 2024-01-01

## 2024-01-01 RX ORDER — SENNA PLUS 8.6 MG/1
2 TABLET ORAL AT BEDTIME
Refills: 0 | Status: DISCONTINUED | OUTPATIENT
Start: 2024-01-01 | End: 2024-01-01

## 2024-01-01 RX ORDER — SODIUM CHLORIDE 9 MG/ML
1000 INJECTION, SOLUTION INTRAVENOUS
Refills: 0 | Status: DISCONTINUED | OUTPATIENT
Start: 2024-01-01 | End: 2024-01-01

## 2024-01-01 RX ORDER — METOPROLOL TARTRATE 50 MG
25 TABLET ORAL DAILY
Refills: 0 | Status: DISCONTINUED | OUTPATIENT
Start: 2024-01-01 | End: 2024-01-01

## 2024-01-01 RX ORDER — HYDROMORPHONE HYDROCHLORIDE 2 MG/ML
0.5 INJECTION INTRAMUSCULAR; INTRAVENOUS; SUBCUTANEOUS EVERY 4 HOURS
Refills: 0 | Status: DISCONTINUED | OUTPATIENT
Start: 2024-01-01 | End: 2024-01-01

## 2024-01-01 RX ORDER — OLANZAPINE 15 MG/1
2.5 TABLET, FILM COATED ORAL ONCE
Refills: 0 | Status: COMPLETED | OUTPATIENT
Start: 2024-01-01 | End: 2024-01-01

## 2024-01-01 RX ORDER — METOPROLOL TARTRATE 50 MG
1 TABLET ORAL
Qty: 0 | Refills: 0 | DISCHARGE
Start: 2024-01-01

## 2024-01-01 RX ORDER — CEFTRIAXONE 500 MG/1
1000 INJECTION, POWDER, FOR SOLUTION INTRAMUSCULAR; INTRAVENOUS EVERY 24 HOURS
Refills: 0 | Status: DISCONTINUED | OUTPATIENT
Start: 2024-01-01 | End: 2024-01-01

## 2024-01-01 RX ORDER — FUROSEMIDE 40 MG
40 TABLET ORAL
Refills: 0 | Status: DISCONTINUED | OUTPATIENT
Start: 2024-01-01 | End: 2024-01-01

## 2024-01-01 RX ORDER — HEPARIN SODIUM 5000 [USP'U]/ML
5000 INJECTION INTRAVENOUS; SUBCUTANEOUS ONCE
Refills: 0 | Status: COMPLETED | OUTPATIENT
Start: 2024-01-01 | End: 2024-01-01

## 2024-01-01 RX ORDER — ONDANSETRON 8 MG/1
4 TABLET, FILM COATED ORAL EVERY 6 HOURS
Refills: 0 | Status: DISCONTINUED | OUTPATIENT
Start: 2024-01-01 | End: 2024-01-01

## 2024-01-01 RX ORDER — POVIDONE-IODINE 5 %
1 AEROSOL (ML) TOPICAL ONCE
Refills: 0 | Status: DISCONTINUED | OUTPATIENT
Start: 2024-01-01 | End: 2024-01-01

## 2024-01-01 RX ORDER — ACETAMINOPHEN 500 MG
975 TABLET ORAL EVERY 6 HOURS
Refills: 0 | Status: DISCONTINUED | OUTPATIENT
Start: 2024-01-01 | End: 2024-01-01

## 2024-01-01 RX ORDER — ERGOCALCIFEROL 1.25 MG/1
1 CAPSULE ORAL
Qty: 0 | Refills: 0 | DISCHARGE

## 2024-01-01 RX ORDER — ACETAMINOPHEN 500 MG
650 TABLET ORAL ONCE
Refills: 0 | Status: COMPLETED | OUTPATIENT
Start: 2024-01-01 | End: 2024-01-01

## 2024-01-01 RX ORDER — SODIUM CHLORIDE 9 MG/ML
500 INJECTION, SOLUTION INTRAVENOUS ONCE
Refills: 0 | Status: COMPLETED | OUTPATIENT
Start: 2024-01-01 | End: 2024-01-01

## 2024-01-01 RX ORDER — FUROSEMIDE 40 MG
20 TABLET ORAL ONCE
Refills: 0 | Status: COMPLETED | OUTPATIENT
Start: 2024-01-01 | End: 2024-01-01

## 2024-01-01 RX ORDER — VANCOMYCIN HCL 1 G
1000 VIAL (EA) INTRAVENOUS ONCE
Refills: 0 | Status: COMPLETED | OUTPATIENT
Start: 2024-01-01 | End: 2024-01-01

## 2024-01-01 RX ORDER — CHLORHEXIDINE GLUCONATE 213 G/1000ML
1 SOLUTION TOPICAL EVERY 12 HOURS
Refills: 0 | Status: COMPLETED | OUTPATIENT
Start: 2024-01-01 | End: 2024-01-01

## 2024-01-01 RX ORDER — GABAPENTIN 400 MG/1
100 CAPSULE ORAL THREE TIMES A DAY
Refills: 0 | Status: DISCONTINUED | OUTPATIENT
Start: 2024-01-01 | End: 2024-01-01

## 2024-01-01 RX ORDER — THIAMINE MONONITRATE (VIT B1) 100 MG
500 TABLET ORAL DAILY
Refills: 0 | Status: COMPLETED | OUTPATIENT
Start: 2024-01-01 | End: 2024-01-01

## 2024-01-01 RX ORDER — FUROSEMIDE 40 MG
40 TABLET ORAL ONCE
Refills: 0 | Status: COMPLETED | OUTPATIENT
Start: 2024-01-01 | End: 2024-01-01

## 2024-01-01 RX ORDER — OXYCODONE HYDROCHLORIDE 5 MG/1
2.5 TABLET ORAL EVERY 6 HOURS
Refills: 0 | Status: DISCONTINUED | OUTPATIENT
Start: 2024-01-01 | End: 2024-01-01

## 2024-01-01 RX ORDER — ACETAMINOPHEN 500 MG
3 TABLET ORAL
Qty: 0 | Refills: 0 | DISCHARGE
Start: 2024-01-01

## 2024-01-01 RX ORDER — BIMATOPROST 0.3 MG/ML
1 SOLUTION/ DROPS OPHTHALMIC
Qty: 0 | Refills: 0 | DISCHARGE

## 2024-01-01 RX ORDER — ACETAMINOPHEN 500 MG
1000 TABLET ORAL EVERY 8 HOURS
Refills: 0 | Status: DISCONTINUED | OUTPATIENT
Start: 2024-01-01 | End: 2024-01-01

## 2024-01-01 RX ORDER — HALOPERIDOL DECANOATE 100 MG/ML
2 INJECTION INTRAMUSCULAR ONCE
Refills: 0 | Status: COMPLETED | OUTPATIENT
Start: 2024-01-01 | End: 2024-01-01

## 2024-01-01 RX ORDER — POLYETHYLENE GLYCOL 3350 17 G/17G
17 POWDER, FOR SOLUTION ORAL EVERY 12 HOURS
Refills: 0 | Status: DISCONTINUED | OUTPATIENT
Start: 2024-01-01 | End: 2024-01-01

## 2024-01-01 RX ORDER — BENZOCAINE 10 %
1 GEL (GRAM) MUCOUS MEMBRANE EVERY 6 HOURS
Refills: 0 | Status: DISCONTINUED | OUTPATIENT
Start: 2024-01-01 | End: 2024-01-01

## 2024-01-01 RX ORDER — CHOLECALCIFEROL (VITAMIN D3) 125 MCG
800 CAPSULE ORAL DAILY
Refills: 0 | Status: DISCONTINUED | OUTPATIENT
Start: 2024-01-01 | End: 2024-01-01

## 2024-01-01 RX ORDER — METOPROLOL TARTRATE 50 MG
25 TABLET ORAL EVERY 24 HOURS
Refills: 0 | Status: DISCONTINUED | OUTPATIENT
Start: 2024-01-01 | End: 2024-01-01

## 2024-01-01 RX ORDER — LATANOPROST 0.05 MG/ML
1 SOLUTION/ DROPS OPHTHALMIC; TOPICAL AT BEDTIME
Refills: 0 | Status: DISCONTINUED | OUTPATIENT
Start: 2024-01-01 | End: 2024-01-01

## 2024-01-01 RX ORDER — SACUBITRIL AND VALSARTAN 24; 26 MG/1; MG/1
1 TABLET, FILM COATED ORAL
Qty: 60 | Refills: 0
Start: 2024-01-01 | End: 2024-01-01

## 2024-01-01 RX ORDER — POLYETHYLENE GLYCOL 3350 17 G/17G
17 POWDER, FOR SOLUTION ORAL
Qty: 0 | Refills: 0 | DISCHARGE
Start: 2024-01-01

## 2024-01-01 RX ORDER — MAGNESIUM OXIDE 400 MG ORAL TABLET 241.3 MG
400 TABLET ORAL ONCE
Refills: 0 | Status: COMPLETED | OUTPATIENT
Start: 2024-01-01 | End: 2024-01-01

## 2024-01-01 RX ORDER — DAPAGLIFLOZIN 10 MG/1
1 TABLET, FILM COATED ORAL
Qty: 30 | Refills: 0
Start: 2024-01-01 | End: 2024-01-01

## 2024-01-01 RX ORDER — SENNA PLUS 8.6 MG/1
2 TABLET ORAL
Qty: 0 | Refills: 0 | DISCHARGE
Start: 2024-01-01

## 2024-01-01 RX ORDER — BIMATOPROST 0.3 MG/ML
1 SOLUTION/ DROPS OPHTHALMIC
Qty: 1 | Refills: 0
Start: 2024-01-01

## 2024-01-01 RX ORDER — HEPARIN SODIUM 5000 [USP'U]/ML
5000 INJECTION INTRAVENOUS; SUBCUTANEOUS EVERY 12 HOURS
Refills: 0 | Status: DISCONTINUED | OUTPATIENT
Start: 2024-01-01 | End: 2024-01-01

## 2024-01-01 RX ORDER — LATANOPROST 0.05 MG/ML
1 SOLUTION/ DROPS OPHTHALMIC; TOPICAL
Qty: 0 | Refills: 0 | DISCHARGE
Start: 2024-01-01

## 2024-01-01 RX ORDER — METOPROLOL TARTRATE 50 MG
5 TABLET ORAL ONCE
Refills: 0 | Status: COMPLETED | OUTPATIENT
Start: 2024-01-01 | End: 2024-01-01

## 2024-01-01 RX ORDER — POTASSIUM CHLORIDE 20 MEQ
10 PACKET (EA) ORAL ONCE
Refills: 0 | Status: COMPLETED | OUTPATIENT
Start: 2024-01-01 | End: 2024-01-01

## 2024-01-01 RX ORDER — OLANZAPINE 15 MG/1
5 TABLET, FILM COATED ORAL AT BEDTIME
Refills: 0 | Status: DISCONTINUED | OUTPATIENT
Start: 2024-01-01 | End: 2024-01-01

## 2024-01-01 RX ORDER — BUDESONIDE, MICRONIZED 100 %
0.5 POWDER (GRAM) MISCELLANEOUS EVERY 12 HOURS
Refills: 0 | Status: DISCONTINUED | OUTPATIENT
Start: 2024-01-01 | End: 2024-01-01

## 2024-01-01 RX ORDER — MAGNESIUM HYDROXIDE 400 MG/1
30 TABLET, CHEWABLE ORAL DAILY
Refills: 0 | Status: DISCONTINUED | OUTPATIENT
Start: 2024-01-01 | End: 2024-01-01

## 2024-01-01 RX ORDER — TRAMADOL HYDROCHLORIDE 50 MG/1
50 TABLET ORAL EVERY 4 HOURS
Refills: 0 | Status: DISCONTINUED | OUTPATIENT
Start: 2024-01-01 | End: 2024-01-01

## 2024-01-01 RX ORDER — TRAMADOL HYDROCHLORIDE 50 MG/1
25 TABLET ORAL EVERY 4 HOURS
Refills: 0 | Status: DISCONTINUED | OUTPATIENT
Start: 2024-01-01 | End: 2024-01-01

## 2024-01-01 RX ORDER — ERGOCALCIFEROL 1.25 MG/1
50000 CAPSULE ORAL
Refills: 0 | Status: DISCONTINUED | OUTPATIENT
Start: 2024-01-01 | End: 2024-01-01

## 2024-01-01 RX ORDER — POLYETHYLENE GLYCOL 3350 17 G/17G
17 POWDER, FOR SOLUTION ORAL AT BEDTIME
Refills: 0 | Status: DISCONTINUED | OUTPATIENT
Start: 2024-01-01 | End: 2024-01-01

## 2024-01-01 RX ORDER — LIDOCAINE 4 G/100G
1 CREAM TOPICAL DAILY
Refills: 0 | Status: DISCONTINUED | OUTPATIENT
Start: 2024-01-01 | End: 2024-01-01

## 2024-01-01 RX ORDER — SODIUM CHLORIDE 9 MG/ML
1000 INJECTION INTRAMUSCULAR; INTRAVENOUS; SUBCUTANEOUS ONCE
Refills: 0 | Status: DISCONTINUED | OUTPATIENT
Start: 2024-01-01 | End: 2024-01-01

## 2024-01-01 RX ORDER — OLANZAPINE 15 MG/1
2.5 TABLET, FILM COATED ORAL EVERY 4 HOURS
Refills: 0 | Status: DISCONTINUED | OUTPATIENT
Start: 2024-01-01 | End: 2024-01-01

## 2024-01-01 RX ORDER — IPRATROPIUM BROMIDE 0.2 MG/ML
500 SOLUTION, NON-ORAL INHALATION EVERY 6 HOURS
Refills: 0 | Status: DISCONTINUED | OUTPATIENT
Start: 2024-01-01 | End: 2024-01-01

## 2024-01-01 RX ORDER — SODIUM CHLORIDE 9 MG/ML
500 INJECTION INTRAMUSCULAR; INTRAVENOUS; SUBCUTANEOUS ONCE
Refills: 0 | Status: COMPLETED | OUTPATIENT
Start: 2024-01-01 | End: 2024-01-01

## 2024-01-01 RX ORDER — OLANZAPINE 15 MG/1
7.5 TABLET, FILM COATED ORAL AT BEDTIME
Refills: 0 | Status: DISCONTINUED | OUTPATIENT
Start: 2024-01-01 | End: 2024-01-01

## 2024-01-01 RX ADMIN — POLYETHYLENE GLYCOL 3350 17 GRAM(S): 17 POWDER, FOR SOLUTION ORAL at 06:38

## 2024-01-01 RX ADMIN — Medication 975 MILLIGRAM(S): at 12:15

## 2024-01-01 RX ADMIN — Medication 25 MILLIGRAM(S): at 09:11

## 2024-01-01 RX ADMIN — Medication 100 MILLIGRAM(S): at 10:23

## 2024-01-01 RX ADMIN — APIXABAN 2.5 MILLIGRAM(S): 2.5 TABLET, FILM COATED ORAL at 23:55

## 2024-01-01 RX ADMIN — HEPARIN SODIUM 5000 UNIT(S): 5000 INJECTION INTRAVENOUS; SUBCUTANEOUS at 06:02

## 2024-01-01 RX ADMIN — Medication 800 UNIT(S): at 11:29

## 2024-01-01 RX ADMIN — Medication 25 MILLIGRAM(S): at 23:55

## 2024-01-01 RX ADMIN — Medication 975 MILLIGRAM(S): at 07:59

## 2024-01-01 RX ADMIN — Medication 975 MILLIGRAM(S): at 13:41

## 2024-01-01 RX ADMIN — CEFTRIAXONE 100 MILLIGRAM(S): 500 INJECTION, POWDER, FOR SOLUTION INTRAMUSCULAR; INTRAVENOUS at 14:41

## 2024-01-01 RX ADMIN — Medication 105 MILLIGRAM(S): at 11:33

## 2024-01-01 RX ADMIN — GABAPENTIN 100 MILLIGRAM(S): 400 CAPSULE ORAL at 14:50

## 2024-01-01 RX ADMIN — LIDOCAINE 1 PATCH: 4 CREAM TOPICAL at 12:52

## 2024-01-01 RX ADMIN — Medication 975 MILLIGRAM(S): at 18:39

## 2024-01-01 RX ADMIN — Medication 25 MILLIGRAM(S): at 08:41

## 2024-01-01 RX ADMIN — SODIUM CHLORIDE 500 MILLILITER(S): 9 INJECTION INTRAMUSCULAR; INTRAVENOUS; SUBCUTANEOUS at 23:19

## 2024-01-01 RX ADMIN — SODIUM CHLORIDE 60 MILLILITER(S): 9 INJECTION, SOLUTION INTRAVENOUS at 06:49

## 2024-01-01 RX ADMIN — SENNA PLUS 2 TABLET(S): 8.6 TABLET ORAL at 21:51

## 2024-01-01 RX ADMIN — Medication 975 MILLIGRAM(S): at 06:52

## 2024-01-01 RX ADMIN — POLYETHYLENE GLYCOL 3350 17 GRAM(S): 17 POWDER, FOR SOLUTION ORAL at 17:52

## 2024-01-01 RX ADMIN — APIXABAN 2.5 MILLIGRAM(S): 2.5 TABLET, FILM COATED ORAL at 06:39

## 2024-01-01 RX ADMIN — Medication 650 MILLIGRAM(S): at 04:33

## 2024-01-01 RX ADMIN — Medication 25 MILLIGRAM(S): at 09:56

## 2024-01-01 RX ADMIN — Medication 975 MILLIGRAM(S): at 17:52

## 2024-01-01 RX ADMIN — Medication 20 MILLIGRAM(S): at 19:43

## 2024-01-01 RX ADMIN — Medication 975 MILLIGRAM(S): at 17:31

## 2024-01-01 RX ADMIN — LIDOCAINE 1 PATCH: 4 CREAM TOPICAL at 00:45

## 2024-01-01 RX ADMIN — Medication 105 MILLIGRAM(S): at 10:37

## 2024-01-01 RX ADMIN — Medication 975 MILLIGRAM(S): at 12:17

## 2024-01-01 RX ADMIN — Medication 975 MILLIGRAM(S): at 06:50

## 2024-01-01 RX ADMIN — Medication 10 MILLIGRAM(S): at 11:29

## 2024-01-01 RX ADMIN — LIDOCAINE 1 PATCH: 4 CREAM TOPICAL at 18:00

## 2024-01-01 RX ADMIN — Medication 20 MILLIGRAM(S): at 11:33

## 2024-01-01 RX ADMIN — Medication 25 MILLIGRAM(S): at 19:08

## 2024-01-01 RX ADMIN — Medication 25 MILLIGRAM(S): at 05:41

## 2024-01-01 RX ADMIN — LIDOCAINE 1 PATCH: 4 CREAM TOPICAL at 11:16

## 2024-01-01 RX ADMIN — SENNA PLUS 2 TABLET(S): 8.6 TABLET ORAL at 22:07

## 2024-01-01 RX ADMIN — Medication 975 MILLIGRAM(S): at 17:49

## 2024-01-01 RX ADMIN — HEPARIN SODIUM 5000 UNIT(S): 5000 INJECTION INTRAVENOUS; SUBCUTANEOUS at 21:38

## 2024-01-01 RX ADMIN — TRAMADOL HYDROCHLORIDE 50 MILLIGRAM(S): 50 TABLET ORAL at 03:01

## 2024-01-01 RX ADMIN — Medication 975 MILLIGRAM(S): at 11:27

## 2024-01-01 RX ADMIN — OLANZAPINE 2.5 MILLIGRAM(S): 15 TABLET, FILM COATED ORAL at 11:24

## 2024-01-01 RX ADMIN — OLANZAPINE 5 MILLIGRAM(S): 15 TABLET, FILM COATED ORAL at 21:52

## 2024-01-01 RX ADMIN — GABAPENTIN 100 MILLIGRAM(S): 400 CAPSULE ORAL at 06:53

## 2024-01-01 RX ADMIN — HEPARIN SODIUM 5000 UNIT(S): 5000 INJECTION INTRAVENOUS; SUBCUTANEOUS at 14:12

## 2024-01-01 RX ADMIN — SODIUM CHLORIDE 1000 MILLILITER(S): 9 INJECTION, SOLUTION INTRAVENOUS at 10:24

## 2024-01-01 RX ADMIN — Medication 975 MILLIGRAM(S): at 06:27

## 2024-01-01 RX ADMIN — POLYETHYLENE GLYCOL 3350 17 GRAM(S): 17 POWDER, FOR SOLUTION ORAL at 06:27

## 2024-01-01 RX ADMIN — Medication 975 MILLIGRAM(S): at 05:59

## 2024-01-01 RX ADMIN — CEFTRIAXONE 100 MILLIGRAM(S): 500 INJECTION, POWDER, FOR SOLUTION INTRAMUSCULAR; INTRAVENOUS at 19:07

## 2024-01-01 RX ADMIN — Medication 10 MILLIEQUIVALENT(S): at 08:41

## 2024-01-01 RX ADMIN — HEPARIN SODIUM 5000 UNIT(S): 5000 INJECTION INTRAVENOUS; SUBCUTANEOUS at 22:26

## 2024-01-01 RX ADMIN — POLYETHYLENE GLYCOL 3350 17 GRAM(S): 17 POWDER, FOR SOLUTION ORAL at 17:30

## 2024-01-01 RX ADMIN — LATANOPROST 1 DROP(S): 0.05 SOLUTION/ DROPS OPHTHALMIC; TOPICAL at 22:52

## 2024-01-01 RX ADMIN — LIDOCAINE 1 PATCH: 4 CREAM TOPICAL at 23:00

## 2024-01-01 RX ADMIN — TRAMADOL HYDROCHLORIDE 50 MILLIGRAM(S): 50 TABLET ORAL at 03:31

## 2024-01-01 RX ADMIN — Medication 250 MILLIGRAM(S): at 19:43

## 2024-01-01 RX ADMIN — CHLORHEXIDINE GLUCONATE 1 APPLICATION(S): 213 SOLUTION TOPICAL at 07:05

## 2024-01-01 RX ADMIN — Medication 100 MILLIGRAM(S): at 05:41

## 2024-01-01 RX ADMIN — OLANZAPINE 2.5 MILLIGRAM(S): 15 TABLET, FILM COATED ORAL at 21:50

## 2024-01-01 RX ADMIN — Medication 975 MILLIGRAM(S): at 22:42

## 2024-01-01 RX ADMIN — POLYETHYLENE GLYCOL 3350 17 GRAM(S): 17 POWDER, FOR SOLUTION ORAL at 17:49

## 2024-01-01 RX ADMIN — Medication 975 MILLIGRAM(S): at 12:11

## 2024-01-01 RX ADMIN — Medication 100 MILLIGRAM(S): at 17:01

## 2024-01-01 RX ADMIN — MAGNESIUM OXIDE 400 MG ORAL TABLET 400 MILLIGRAM(S): 241.3 TABLET ORAL at 08:41

## 2024-01-01 RX ADMIN — CEFTRIAXONE 100 MILLIGRAM(S): 500 INJECTION, POWDER, FOR SOLUTION INTRAMUSCULAR; INTRAVENOUS at 18:01

## 2024-01-01 RX ADMIN — Medication 100 MILLIGRAM(S): at 08:19

## 2024-01-01 RX ADMIN — Medication 650 MILLIGRAM(S): at 12:33

## 2024-01-01 RX ADMIN — LIDOCAINE 1 PATCH: 4 CREAM TOPICAL at 12:17

## 2024-01-01 RX ADMIN — SENNA PLUS 2 TABLET(S): 8.6 TABLET ORAL at 22:26

## 2024-01-01 RX ADMIN — CEFTRIAXONE 100 MILLIGRAM(S): 500 INJECTION, POWDER, FOR SOLUTION INTRAMUSCULAR; INTRAVENOUS at 15:07

## 2024-01-01 RX ADMIN — LIDOCAINE 1 PATCH: 4 CREAM TOPICAL at 19:31

## 2024-01-01 RX ADMIN — Medication 20 MILLIGRAM(S): at 11:50

## 2024-01-01 RX ADMIN — Medication 100 MILLIGRAM(S): at 18:34

## 2024-01-01 RX ADMIN — APIXABAN 2.5 MILLIGRAM(S): 2.5 TABLET, FILM COATED ORAL at 08:17

## 2024-01-01 RX ADMIN — Medication 650 MILLIGRAM(S): at 05:33

## 2024-01-01 RX ADMIN — APIXABAN 2.5 MILLIGRAM(S): 2.5 TABLET, FILM COATED ORAL at 17:00

## 2024-01-01 RX ADMIN — Medication 10 MILLIGRAM(S): at 06:27

## 2024-01-01 RX ADMIN — OLANZAPINE 2.5 MILLIGRAM(S): 15 TABLET, FILM COATED ORAL at 13:27

## 2024-01-01 RX ADMIN — POLYETHYLENE GLYCOL 3350 17 GRAM(S): 17 POWDER, FOR SOLUTION ORAL at 23:55

## 2024-01-01 RX ADMIN — POLYETHYLENE GLYCOL 3350 17 GRAM(S): 17 POWDER, FOR SOLUTION ORAL at 21:51

## 2024-01-01 RX ADMIN — CEFTRIAXONE 100 MILLIGRAM(S): 500 INJECTION, POWDER, FOR SOLUTION INTRAMUSCULAR; INTRAVENOUS at 15:10

## 2024-01-01 RX ADMIN — GABAPENTIN 100 MILLIGRAM(S): 400 CAPSULE ORAL at 14:15

## 2024-01-01 RX ADMIN — Medication 975 MILLIGRAM(S): at 23:00

## 2024-01-01 RX ADMIN — GABAPENTIN 100 MILLIGRAM(S): 400 CAPSULE ORAL at 22:41

## 2024-01-01 RX ADMIN — SENNA PLUS 2 TABLET(S): 8.6 TABLET ORAL at 23:55

## 2024-01-01 RX ADMIN — Medication 975 MILLIGRAM(S): at 22:54

## 2024-01-01 RX ADMIN — POLYETHYLENE GLYCOL 3350 17 GRAM(S): 17 POWDER, FOR SOLUTION ORAL at 06:53

## 2024-01-01 RX ADMIN — SENNA PLUS 2 TABLET(S): 8.6 TABLET ORAL at 21:25

## 2024-01-01 RX ADMIN — Medication 800 UNIT(S): at 14:10

## 2024-01-01 RX ADMIN — Medication 975 MILLIGRAM(S): at 07:45

## 2024-01-01 RX ADMIN — Medication 975 MILLIGRAM(S): at 12:52

## 2024-01-01 RX ADMIN — LATANOPROST 1 DROP(S): 0.05 SOLUTION/ DROPS OPHTHALMIC; TOPICAL at 21:32

## 2024-01-01 RX ADMIN — Medication 1000 MILLIGRAM(S): at 06:43

## 2024-01-01 RX ADMIN — Medication 650 MILLIGRAM(S): at 11:33

## 2024-01-01 RX ADMIN — Medication 3 MILLIGRAM(S): at 21:25

## 2024-01-01 RX ADMIN — APIXABAN 2.5 MILLIGRAM(S): 2.5 TABLET, FILM COATED ORAL at 17:31

## 2024-01-01 RX ADMIN — Medication 975 MILLIGRAM(S): at 18:31

## 2024-01-01 RX ADMIN — GABAPENTIN 100 MILLIGRAM(S): 400 CAPSULE ORAL at 14:01

## 2024-01-01 RX ADMIN — APIXABAN 2.5 MILLIGRAM(S): 2.5 TABLET, FILM COATED ORAL at 17:01

## 2024-01-01 RX ADMIN — OLANZAPINE 2.5 MILLIGRAM(S): 15 TABLET, FILM COATED ORAL at 14:00

## 2024-01-01 RX ADMIN — GABAPENTIN 100 MILLIGRAM(S): 400 CAPSULE ORAL at 13:20

## 2024-01-01 RX ADMIN — OLANZAPINE 2.5 MILLIGRAM(S): 15 TABLET, FILM COATED ORAL at 22:24

## 2024-01-01 RX ADMIN — LATANOPROST 1 DROP(S): 0.05 SOLUTION/ DROPS OPHTHALMIC; TOPICAL at 23:50

## 2024-01-01 RX ADMIN — LATANOPROST 1 DROP(S): 0.05 SOLUTION/ DROPS OPHTHALMIC; TOPICAL at 22:26

## 2024-01-01 RX ADMIN — Medication 975 MILLIGRAM(S): at 18:04

## 2024-01-01 RX ADMIN — Medication 40 MILLIGRAM(S): at 19:03

## 2024-01-01 RX ADMIN — Medication 975 MILLIGRAM(S): at 11:17

## 2024-01-01 RX ADMIN — HEPARIN SODIUM 5000 UNIT(S): 5000 INJECTION INTRAVENOUS; SUBCUTANEOUS at 13:16

## 2024-01-01 RX ADMIN — OLANZAPINE 2.5 MILLIGRAM(S): 15 TABLET, FILM COATED ORAL at 20:15

## 2024-01-01 RX ADMIN — POLYETHYLENE GLYCOL 3350 17 GRAM(S): 17 POWDER, FOR SOLUTION ORAL at 17:31

## 2024-01-01 RX ADMIN — LIDOCAINE 1 PATCH: 4 CREAM TOPICAL at 11:51

## 2024-01-01 RX ADMIN — Medication 975 MILLIGRAM(S): at 17:09

## 2024-01-01 RX ADMIN — APIXABAN 2.5 MILLIGRAM(S): 2.5 TABLET, FILM COATED ORAL at 06:09

## 2024-01-01 RX ADMIN — LIDOCAINE 1 PATCH: 4 CREAM TOPICAL at 18:07

## 2024-01-01 RX ADMIN — Medication 1 SPRAY(S): at 06:49

## 2024-01-01 RX ADMIN — Medication 975 MILLIGRAM(S): at 22:27

## 2024-01-01 RX ADMIN — OLANZAPINE 5 MILLIGRAM(S): 15 TABLET, FILM COATED ORAL at 21:25

## 2024-01-01 RX ADMIN — POLYETHYLENE GLYCOL 3350 17 GRAM(S): 17 POWDER, FOR SOLUTION ORAL at 17:09

## 2024-01-01 RX ADMIN — SENNA PLUS 2 TABLET(S): 8.6 TABLET ORAL at 22:51

## 2024-01-01 RX ADMIN — Medication 40 MILLIGRAM(S): at 08:18

## 2024-01-01 RX ADMIN — GABAPENTIN 100 MILLIGRAM(S): 400 CAPSULE ORAL at 07:01

## 2024-01-01 RX ADMIN — Medication 975 MILLIGRAM(S): at 13:10

## 2024-01-01 RX ADMIN — APIXABAN 2.5 MILLIGRAM(S): 2.5 TABLET, FILM COATED ORAL at 06:43

## 2024-01-01 RX ADMIN — GABAPENTIN 100 MILLIGRAM(S): 400 CAPSULE ORAL at 22:54

## 2024-01-01 RX ADMIN — Medication 25 MILLIGRAM(S): at 08:17

## 2024-01-01 RX ADMIN — Medication 975 MILLIGRAM(S): at 02:42

## 2024-01-01 RX ADMIN — LATANOPROST 1 DROP(S): 0.05 SOLUTION/ DROPS OPHTHALMIC; TOPICAL at 22:49

## 2024-01-01 RX ADMIN — Medication 975 MILLIGRAM(S): at 01:42

## 2024-01-01 RX ADMIN — APIXABAN 2.5 MILLIGRAM(S): 2.5 TABLET, FILM COATED ORAL at 18:01

## 2024-01-01 RX ADMIN — Medication 100 MILLIGRAM(S): at 17:00

## 2024-01-01 RX ADMIN — POLYETHYLENE GLYCOL 3350 17 GRAM(S): 17 POWDER, FOR SOLUTION ORAL at 05:59

## 2024-01-01 RX ADMIN — POLYETHYLENE GLYCOL 3350 17 GRAM(S): 17 POWDER, FOR SOLUTION ORAL at 17:00

## 2024-01-01 RX ADMIN — SENNA PLUS 2 TABLET(S): 8.6 TABLET ORAL at 21:52

## 2024-01-01 RX ADMIN — CHLORHEXIDINE GLUCONATE 1 APPLICATION(S): 213 SOLUTION TOPICAL at 21:38

## 2024-01-01 RX ADMIN — Medication 975 MILLIGRAM(S): at 19:15

## 2024-01-01 RX ADMIN — APIXABAN 2.5 MILLIGRAM(S): 2.5 TABLET, FILM COATED ORAL at 18:14

## 2024-01-01 RX ADMIN — Medication 975 MILLIGRAM(S): at 18:29

## 2024-01-01 RX ADMIN — SENNA PLUS 2 TABLET(S): 8.6 TABLET ORAL at 22:40

## 2024-01-01 RX ADMIN — APIXABAN 2.5 MILLIGRAM(S): 2.5 TABLET, FILM COATED ORAL at 05:41

## 2024-01-01 RX ADMIN — Medication 20 MILLIGRAM(S): at 11:17

## 2024-01-01 RX ADMIN — OLANZAPINE 2.5 MILLIGRAM(S): 15 TABLET, FILM COATED ORAL at 23:21

## 2024-01-01 RX ADMIN — LATANOPROST 1 DROP(S): 0.05 SOLUTION/ DROPS OPHTHALMIC; TOPICAL at 22:39

## 2024-01-01 RX ADMIN — LIDOCAINE 1 PATCH: 4 CREAM TOPICAL at 22:54

## 2024-01-01 RX ADMIN — Medication 105 MILLIGRAM(S): at 19:23

## 2024-01-01 RX ADMIN — Medication 25 MILLIGRAM(S): at 09:10

## 2024-01-01 RX ADMIN — HALOPERIDOL DECANOATE 2 MILLIGRAM(S): 100 INJECTION INTRAMUSCULAR at 22:47

## 2024-01-01 RX ADMIN — APIXABAN 2.5 MILLIGRAM(S): 2.5 TABLET, FILM COATED ORAL at 19:08

## 2024-01-01 RX ADMIN — Medication 1000 MILLIGRAM(S): at 06:13

## 2024-01-01 RX ADMIN — Medication 25 MILLIGRAM(S): at 10:23

## 2024-01-01 RX ADMIN — Medication 975 MILLIGRAM(S): at 22:51

## 2024-01-01 RX ADMIN — GABAPENTIN 100 MILLIGRAM(S): 400 CAPSULE ORAL at 13:47

## 2024-01-01 RX ADMIN — Medication 975 MILLIGRAM(S): at 06:39

## 2024-01-01 RX ADMIN — Medication 100 MILLIGRAM(S): at 02:44

## 2024-01-01 RX ADMIN — Medication 975 MILLIGRAM(S): at 11:54

## 2024-02-20 NOTE — ED PROVIDER NOTE - OBJECTIVE STATEMENT
HTN, GIB, Afib on Eliquis, chronic diastolic CHF with, EF 60% 89F hx HTN, GIB, Afib on Eliquis, chronic diastolic CHF with, EF 60%. tripped and fell today falling on left leg. no head struck. no pain. has not attempted to ambulate since fall. otherwise in usual state of health.

## 2024-02-20 NOTE — ED PROVIDER NOTE - PHYSICAL EXAMINATION
General: Awake, alert and oriented. No acute distress.   Skin:  Appropriate color for ethnicity  HENMT: head normocephalic and atraumatic  EYES: Conjunctiva clear. nonicteric sclera  Cardiac: well perfused  Respiratory: breathing comfortably on room air  Abdominal: nondistended  MSK:  no visualized external signs of trauma. ttp in anterior femur area. no skin changes. no leg swelling. comaprtments soft. unable to flex from left hip. pain on passive flexion of left hip. no apparent deficits in ROM of any other extremity  Neurological: The patient is awake, alert and oriented with normal speech. CN 2-12 grossly intact. no apparent deficits. Memory is normal and thought process is intact. moving all extremities spontaneously   Psychiatric: Appropriate mood and affect. Good judgement and insight

## 2024-02-20 NOTE — ED ADULT TRIAGE NOTE - CHIEF COMPLAINT QUOTE
Pt. presents after a mechanical fall getting out of an elevator. Pt. endorses left hip pain, pt. is on Eliquis. Denies head injury or LOC. Legs do not appear shortened or rotated. HX of HTN. Pt. in no distress and well appearing in triage. Pt. presents after a mechanical fall getting out of an elevator. Pt. endorses left hip pain, pt. is on Eliquis. Denies head injury or LOC. Legs do not appear shortened or rotated. HX of HTN,AFIB. Pt. in no distress and well appearing in triage.

## 2024-02-20 NOTE — ED ADULT NURSE NOTE - CHIEF COMPLAINT QUOTE
Pt. presents after a mechanical fall getting out of an elevator. Pt. endorses left hip pain, pt. is on Eliquis. Denies head injury or LOC. Legs do not appear shortened or rotated. HX of HTN,AFIB. Pt. in no distress and well appearing in triage.

## 2024-02-20 NOTE — ED PROVIDER NOTE - PROGRESS NOTE DETAILS
MK–signout for follow-up CT head and hip patient is here with hip pain after fall is on Eliquis did not hit head is well-appearing on exam follow-up CT head CT hip admit for gait instability MK–CT shows left trochanteric fracture Patient neurovascularly intact orthopedics consulted admitted to medicine for PT pain control, nonoperative fracture

## 2024-02-20 NOTE — ED ADULT NURSE NOTE - OBJECTIVE STATEMENT
pt received aox4 s/p trip and fall out of elevator, bumped into someone else. - headstrike/loc, + blood thinners, pt c/o L hip pain upon movement. pt has not ambulated since fall. no obvious deformities/limb shortening. pt denies dizziness, headache, blurred vision, chest pain, sob, n/v/d. pt well appearing, in nad.

## 2024-02-20 NOTE — ED ADULT NURSE NOTE - NSFALLHARMRISKINTERV_ED_ALL_ED
Assistance OOB with selected safe patient handling equipment if applicable/Assistance with ambulation/Communicate risk of Fall with Harm to all staff, patient, and family/Monitor gait and stability/Provide visual cue: red socks, yellow wristband, yellow gown, etc/Reinforce activity limits and safety measures with patient and family/Bed in lowest position, wheels locked, appropriate side rails in place/Call bell, personal items and telephone in reach/Instruct patient to call for assistance before getting out of bed/chair/stretcher/Non-slip footwear applied when patient is off stretcher/[place] to call system/Physically safe environment - no spills, clutter or unnecessary equipment/Purposeful Proactive Rounding/Room/bathroom lighting operational, light cord in reach

## 2024-02-20 NOTE — ED PROVIDER NOTE - CLINICAL SUMMARY MEDICAL DECISION MAKING FREE TEXT BOX
denies head struck. xrays of leg negative. unable to ambulate due to pain. will obtain ct. will require admission for inability ot ambulate. denies head struck. xrays of leg negative. unable to ambulate due to pain. will obtain ct. will require admission for inability ot ambulate. afib on ekg. rate controlled.

## 2024-02-21 NOTE — CONSULT NOTE ADULT - SUBJECTIVE AND OBJECTIVE BOX
Orthopaedic Surgery Consult Note    For Surgeon:    HPI:  89F w/ PMH HTN, GIB, paroxysmal afib (on eliquis), HFpEF, severe MR s/p Saulo, severe TR, LBP/OA, BIBEMS for mechanical fall that occurred after she tripped and fell. Pt states she was coming out of the elevator in her apartment building when another tenant was going into the elevator at the same time; they bumped into each other and both fell. Patient fell onto the other woman, does not remember which side of her body she fell onto, however she denies LOC, denies head trauma, denies any prodromal symptoms. Her superintendent called EMS. Pt has not attempted to ambulate since the fall. Denies LLE pain at rest but has sharp pain when she moves. No other complaints at this time. Was in her usual state of health prior to the fall.    ED course:  Vitals: afebrile, HR 79, /86, RR 16, SpO2 97% on RA.  Labs: CBC wnl, BUN/Cr 24/1.17, eGFR 43.  EKG: afib with incomplete RBBB  Imaging:   *CT head: no acute intracranial hemorrhage or fracture; superior ophthalmic veins (R>L) increased in size since 2/1/23 (nonspecific), no hyperdense regions to suggest thrombosis.  *CT pelvis: cortical thickening L hemipelvis consistent w/ Paget's disease; mild degenerative changes b/l hips; fat-containing L inguinal hernia w/o incarceration.  *CT L femur: nondisplaced fracture greater trochanter proximal L femur.  Interventions:  cc x1  Consults: ortho (21 Feb 2024 01:50)      Past Medical History    Past Surgical History    Allergies    Social History     Vital Signs Last 24 Hrs  T(C): 36.6 (21 Feb 2024 02:09), Max: 36.8 (20 Feb 2024 20:10)  T(F): 97.8 (21 Feb 2024 02:09), Max: 98.2 (20 Feb 2024 20:10)  HR: 76 (21 Feb 2024 02:09) (74 - 79)  BP: 133/75 (21 Feb 2024 02:09) (133/75 - 158/86)  BP(mean): --  RR: 18 (21 Feb 2024 02:09) (16 - 18)  SpO2: 97% (21 Feb 2024 02:09) (97% - 98%)    Parameters below as of 21 Feb 2024 02:09  Patient On (Oxygen Delivery Method): room air        Physical Exam:  Gen: NAD, A&O x 3  Focused LLE examination  Mild TTP overlying GT area  silt sural/saph/dpn/spn/tib   5/5 EHL/FHL/TA/GS   minimal pain with log roll     Imaging:   CT L femur shows non displaced fx of L GT of L femur     A/P: 89yFemale w/ L GT Fx of L femur s/p mech fall  - med admit for PT eval, possible rehab placement  - please obtain MRI of L hip to r/o intertrochanteric extension   - ok for patient to be restarted on home eliquis, both for chronic AFib and for VTE PPx since ortho recs NWB LLE until MRI done to r/o intertrochanteric extension  - rest of care per primary     -Discussed with Dr. Lona Meier, PGY-2  Orthopedic Surgery

## 2024-02-21 NOTE — H&P ADULT - PROBLEM SELECTOR PLAN 2
Hx of afib, home meds include eliquis 2.5mg PO BID. EKG on admission HR 75, afib with incomplete RBBB, QTc 469 however there is motion artifact.  - c/w eliquis 2.5mg PO BID  - repeat EKG in AM  - obtain collateral from cardiologist Hx of HTN. Home meds: torsemide 20mg qd. BP 130s-150s/70s-90s since admission.  - c/w torsemide 20mg PO qd

## 2024-02-21 NOTE — DIETITIAN INITIAL EVALUATION ADULT - PERTINENT MEDS FT
MEDICATIONS  (STANDING):  acetaminophen     Tablet .. 975 milliGRAM(s) Oral every 6 hours  latanoprost 0.005% Ophthalmic Solution 1 Drop(s) Both EYES at bedtime  lidocaine   4% Patch 1 Patch Transdermal daily  metoprolol succinate ER 25 milliGRAM(s) Oral every 24 hours  polyethylene glycol 3350 17 Gram(s) Oral every 12 hours  senna 2 Tablet(s) Oral at bedtime  torsemide 20 milliGRAM(s) Oral every 24 hours    MEDICATIONS  (PRN):

## 2024-02-21 NOTE — DIETITIAN INITIAL EVALUATION ADULT - ADD RECOMMEND
1. Continue with soft and bite sized diet. Provided nutrition education. Recommend to add MVI.  2. Encourage pt to meet nutritional needs as able   3. Monitor labs: electrolytes, cmp  4. Monitor weights   5. Pain and bowel regimen per team   6. Will continue to assess/honor food preferences as able  7. Monitor adherence to diet education

## 2024-02-21 NOTE — H&P ADULT - ATTENDING COMMENTS
89F w/ PMH HTN, GIB, paroxysmal afib (on eliquis), HFpEF, severe MR s/p transcatheter mitral valve repair using Saulo on 07/3/19 ([employer] IID research trial), severe TR, LBP/OA, gingivitis, BIBEMS for mechanical fall that occurred after she bumped into another tenant in her building, found to have nondisplaced L greater trochanter proximal femur  - plan for MRI to determine extent of fracture and need for OR, hold Eliquis pending MRI  - PT/OT eval  - pain control with lidocaine patch, oxycodone, tylenol. Avoiding NSAIDs  - remainder as above

## 2024-02-21 NOTE — H&P ADULT - HISTORY OF PRESENT ILLNESS
89F w/ PMH HTN, GIB, afib on eliquis, chronic diastolic CHF (EF 60%), presented for mechanical fall that occurred after she tripped and fell. Pt fell on her left lower extremity, denies LOC, denies head trauma, denies LLE pain. She has not attempted to ambulate since the fall. No other complaints at this time. Was in her usual state of health prior to the fall.    ED course:  Vitals: afebrile, HR 79, /86, RR 16, SpO2 97% on RA.  Labs: CBC wnl, BUN/Cr 24/1.17, eGFR 43.  EKG: afib with incomplete RBBB  Imaging:   *CT head: no acute intracranial hemorrhage or fracture; superior ophthalmic veins (R>L) increased in size since 2/1/23 (nonspecific), no hyperdense regions to suggest thrombosis.  *CT pelvis: cortical thickening L hemipelvis consistent w/ Paget's disease; mild degenerative changes b/l hips; fat-containing L inguinal hernia w/o incarceration.  *CT L femur: nondisplaced fracture greater trochanter proximal L femur.  Interventions:  cc x1  Consults: ortho 89F w/ PMH HTN, GIB, paroxysmal afib (on eliquis), HFpEF, severe MR s/p Saulo, severe TR, LBP/OA, BIBEMS for mechanical fall that occurred after she tripped and fell. Pt states she was coming out of the elevator in her apartment building when another tenant was going into the elevator at the same time; they bumped into each other and both fell. Patient fell onto the other woman, does not remember which side of her body she fell onto, however she denies LOC, denies head trauma, denies any prodromal symptoms. Her superintendent called EMS. Pt has not attempted to ambulate since the fall. Denies LLE pain at rest but has sharp pain when she moves. No other complaints at this time. Was in her usual state of health prior to the fall.    ED course:  Vitals: afebrile, HR 79, /86, RR 16, SpO2 97% on RA.  Labs: CBC wnl, BUN/Cr 24/1.17, eGFR 43.  EKG: afib with incomplete RBBB  Imaging:   *CT head: no acute intracranial hemorrhage or fracture; superior ophthalmic veins (R>L) increased in size since 2/1/23 (nonspecific), no hyperdense regions to suggest thrombosis.  *CT pelvis: cortical thickening L hemipelvis consistent w/ Paget's disease; mild degenerative changes b/l hips; fat-containing L inguinal hernia w/o incarceration.  *CT L femur: nondisplaced fracture greater trochanter proximal L femur.  Interventions:  cc x1  Consults: ortho 89F w/ PMH HTN, GIB, paroxysmal afib (on eliquis), HFpEF, severe MR s/p transcatheter mitral valve repair using Saulo on 07/3/19 (Cl[employer] IID research trial), severe TR, LBP/OA, gingivitis, BIBEMS for mechanical fall that occurred after she tripped and fell. Pt states she was coming out of the elevator in her apartment building when another tenant was going into the elevator at the same time; they bumped into each other and both fell. Patient fell onto the other woman, does not remember which side of her body she fell onto, however she denies LOC, denies head trauma, denies any prodromal symptoms. Her superintendent called EMS. Pt has not attempted to ambulate since the fall. Denies LLE pain at rest but has sharp pain when she moves. No other complaints at this time. Was in her usual state of health prior to the fall.    ED course:  Vitals: afebrile, HR 79, /86, RR 16, SpO2 97% on RA.  Labs: CBC wnl, BUN/Cr 24/1.17, eGFR 43.  EKG: afib with incomplete RBBB  Imaging:   *CT head: no acute intracranial hemorrhage or fracture; superior ophthalmic veins (R>L) increased in size since 2/1/23 (nonspecific), no hyperdense regions to suggest thrombosis.  *CT pelvis: cortical thickening L hemipelvis consistent w/ Paget's disease; mild degenerative changes b/l hips; fat-containing L inguinal hernia w/o incarceration.  *CT L femur: nondisplaced fracture greater trochanter proximal L femur.  Interventions:  cc x1  Consults: ortho

## 2024-02-21 NOTE — CONSULT NOTE ADULT - SUBJECTIVE AND OBJECTIVE BOX
Patient is a 89y old  Female who presents with a chief complaint of     HPI:  89F w/ PMH HTN, GIB, paroxysmal afib (on eliquis), HFpEF, severe MR s/p transcatheter mitral valve repair using Saulo on 07/3/19 (Clasp IID research trial), severe TR, LBP/OA, gingivitis, BIBEMS for mechanical fall that occurred after she tripped and fell. Pt states she was coming out of the elevator in her apartment building when another tenant was going into the elevator at the same time; they bumped into each other and both fell. Patient fell onto the other woman, does not remember which side of her body she fell onto, however she denies LOC, denies head trauma, denies any prodromal symptoms. Her superintendent called EMS. Pt has not attempted to ambulate since the fall. Denies LLE pain at rest but has sharp pain when she moves. No other complaints at this time. Was in her usual state of health prior to the fall.    ED course:  Vitals: afebrile, HR 79, /86, RR 16, SpO2 97% on RA.  Labs: CBC wnl, BUN/Cr 24/1.17, eGFR 43.  EKG: afib with incomplete RBBB  Imaging:   *CT head: no acute intracranial hemorrhage or fracture; superior ophthalmic veins (R>L) increased in size since 2/1/23 (nonspecific), no hyperdense regions to suggest thrombosis.  *CT pelvis: cortical thickening L hemipelvis consistent w/ Paget's disease; mild degenerative changes b/l hips; fat-containing L inguinal hernia w/o incarceration.  *CT L femur: nondisplaced fracture greater trochanter proximal L femur.  Interventions:  cc x1  Consults: ortho (21 Feb 2024 01:50)    PAST MEDICAL & SURGICAL HISTORY:  Diastolic heart failure      Mitral regurgitation      Atrial fibrillation      HTN (hypertension)      H/O exploratory laparotomy      H/O total hysterectomy        MEDICATIONS  (STANDING):  acetaminophen     Tablet .. 975 milliGRAM(s) Oral every 6 hours  apixaban 2.5 milliGRAM(s) Oral every 12 hours  latanoprost 0.005% Ophthalmic Solution 1 Drop(s) Both EYES at bedtime  lidocaine   4% Patch 1 Patch Transdermal daily  metoprolol succinate ER 25 milliGRAM(s) Oral every 24 hours  polyethylene glycol 3350 17 Gram(s) Oral every 12 hours  senna 2 Tablet(s) Oral at bedtime  torsemide 20 milliGRAM(s) Oral every 24 hours    MEDICATIONS  (PRN):          Home Living Status :  lives alone in an elevator accessible apartment building          -  Prior Home Care Services :  none          Baseline Functional Level Prior to Admission :             - ADL's/ IADL's :  independent                   - Ambulatory status prior to admission :   walked with no assistive devices , has a cane and a walker        FAMILY HISTORY:      CBC Full  -  ( 20 Feb 2024 21:45 )  WBC Count : 8.03 K/uL  RBC Count : 4.65 M/uL  Hemoglobin : 14.4 g/dL  Hematocrit : 43.7 %  Platelet Count - Automated : 150 K/uL  Mean Cell Volume : 94.0 fl  Mean Cell Hemoglobin : 31.0 pg  Mean Cell Hemoglobin Concentration : 33.0 gm/dL  Auto Neutrophil # : x  Auto Lymphocyte # : x  Auto Monocyte # : x  Auto Eosinophil # : x  Auto Basophil # : x  Auto Neutrophil % : x  Auto Lymphocyte % : x  Auto Monocyte % : x  Auto Eosinophil % : x  Auto Basophil % : x      02-20    143  |  105  |  24<H>  ----------------------------<  84  4.7   |  25  |  1.17    Ca    10.0      20 Feb 2024 21:45  Phos  3.6     02-20  Mg     2.1     02-20        Urinalysis Basic - ( 20 Feb 2024 21:45 )    Color: x / Appearance: x / SG: x / pH: x  Gluc: 84 mg/dL / Ketone: x  / Bili: x / Urobili: x   Blood: x / Protein: x / Nitrite: x   Leuk Esterase: x / RBC: x / WBC x   Sq Epi: x / Non Sq Epi: x / Bacteria: x        Radiology :     < from: CT Head No Cont (02.20.24 @ 23:44) >  ACC: [id number] EXAM:  CT BRAIN   ORDERED BY: DEEPA BRADFORD     PROCEDURE DATE:  02/20/2024          INTERPRETATION:  INDICATIONS: Headache status post fall.    TECHNIQUE: Serial axial images were obtained from the skull base to the   vertex without the use of intravenous contrast. Sagittal and coronal   images were reformatted.    COMPARISON EXAMINATION: CT head 2/1/2023    FINDINGS:  VENTRICLES AND SULCI: Parenchymal volume is consistent with patient age.   Ex vacuo dilatation of the ventricularsystem  INTRA-AXIAL: Basal ganglia calcifications. Patchy periventricular   hypodensities, which may be nonspecific, but may represent chronic   microvascular ischemic changes.  No intracranial mass, acute hemorrhage   or acute transcortical infarct is seen.  EXTRA-AXIAL: No fluid collection is present.  VISUALIZED SINUSES: No air-fluid levels are identified.  VISUALIZED MASTOIDS: Clear.  CALVARIUM: No acute calvarial fracture.  MISCELLANEOUS: Since 2/1/2023, increase in size of right greater than   left superior ophthalmic veins. No hyperdense regions suggestive of   thrombosis.    IMPRESSION:    No acute intracranial hemorrhage or calvarial fracture.  Since 2/1/2023, increase in size of right greater than left superior   ophthalmic veins, nonspecific. No hyperdense regions to suggest   thrombosis.    < from: CT Femur No Cont, Left (02.20.24 @ 23:15) >    ACC: [id number] EXAM:  CT FEMUR ONLY LT   ORDERED BY: ROSSY ROJAS     PROCEDURE DATE:  02/20/2024          INTERPRETATION:  Exam Type: CT FEMUR LEFT  Exam Date and Time: 2/20/2024 11:15 PM  Indication: Left hip pain.  Comparison: Same day CT pelvis.    TECHNIQUE:  Multiplanar CT images of the left femur were obtained without contrast.    FINDINGS:  The bones are demineralized. Again seen is cortical irregularity along   the left femoral head neck junction and region of the greater trochanter.   The mid to distal femur is normal. Left hip joint space is maintained.   Knee joint space is maintained. Pubic symphysis is intact. Vascular   calcification is present.      IMPRESSION:  Findings suspicious for nondisplaced fracture at the left femoral head   neck junction and region of the greater trochanter.     Review of Systems : per HPI         Vital Signs Last 24 Hrs  T(C): 36.4 (21 Feb 2024 05:27), Max: 36.8 (20 Feb 2024 20:10)  T(F): 97.6 (21 Feb 2024 05:27), Max: 98.2 (20 Feb 2024 20:10)  HR: 72 (21 Feb 2024 07:55) (72 - 79)  BP: 133/83 (21 Feb 2024 07:55) (133/75 - 158/86)  BP(mean): --  RR: 18 (21 Feb 2024 05:27) (16 - 18)  SpO2: 97% (21 Feb 2024 05:27) (97% - 98%)    Parameters below as of 21 Feb 2024 05:27  Patient On (Oxygen Delivery Method): room air            Physical Exam :   89 y o woman lying comfortably in semi Montero's position , awake , alert , no new complaints     Head : normocephalic , atraumatic    Eyes : PERRLA , EOMI , no nystagmus , sclera anicteric    ENT / FACE : neg nasal discharge , uvula midline , no oropharyngeal erythema / exudate    Neck : supple , negative JVD , negative carotid bruits , no thyromegaly    Chest : CTA bilaterally , neg wheeze / rhonchi / rales / crackles / egophany    Cardiovascular : regular rate and rhythm , neg murmurs / rubs / gallops    Abdomen : soft , non distended , non tender to palpation in all 4 quadrants ,  normal bowel sounds     Extremities : WWP , neg cyanosis /clubbing / edema     Musculoskeletal :  LLE: no leg deformity, pain with hip flex , TTP overlying prox femur       Skin :        :     Neurologic Exam :     Alert and oriented x  3    Cranial Nerves:           II :                         pupils equal , round and reactive to light , visual fields intact         III/ IV/VI :              extraocular movements intact , neg nystagmus , neg ptosis        V :                        facial sensation intact , V1-3 normal        VII :                      face symmetric , no droop , normal eye closure and smile        VIII :                     hearing intact to finger rub bilaterally        IX and X :             no hoarseness , gag intact , palate/ uvula rise symmetrically        XI :                       SCM / trapezius strength intact bilateral        XII :                      no tongue deviation       Motor Exam:        > 3+/5 Jeffy UE/ LLE ,  RLE hip flex <3-/5 2/2 guarding , distally > 3+/5            Sensation :         intact to light touch x 4 extremities     DTR :           biceps/brachioradialis : equal                            patella/ankle : equal          Gait :  not tested          PM&R Impression : admitted for c/o fall with nondisplaced L greater trochanter proximal femur    - NWB pending MRI       Recommendations / Plan :       1) Physical / Occupational therapy focusing on therapeutic exercises , equipment evaluation , bed mobility/transfer out of bed evaluation , progressive ambulation with assistive devices prn .    2) Current disposition plan recommendation  :    subacute rehab placement

## 2024-02-21 NOTE — PHYSICAL THERAPY INITIAL EVALUATION ADULT - PLANNED THERAPY INTERVENTIONS, PT EVAL
balance training/bed mobility training/gait training/joint mobilization/motor coordination training/neuromuscular re-education/postural re-education/strengthening/transfer training

## 2024-02-21 NOTE — DIETITIAN INITIAL EVALUATION ADULT - PROBLEM SELECTOR PLAN 3
Hx of paroxysmal afib. Home meds: eliquis 2.5mg BID, toprol 25mg qd. Follows w/ Dr. Ruiz. EKG on admission HR 75, afib with incomplete RBBB, QTc 469 however there is motion artifact.  - c/w eliquis 2.5mg PO BID  - c/w toprol 25mg PO qd  - repeat EKG in AM

## 2024-02-21 NOTE — H&P ADULT - PROBLEM SELECTOR PLAN 4
TTE 06/2023 EF 55-60%, mildly dilated LV, mildly dilated RV, mildly reduced RV systolic function, severe biatrial enlargement, severe MR, severe TR, pulmonary HTN, two YUDITH devices in stable position across A2-P2 (unchanged singe 06/2023). Pt underwent percutaneous transcatheter repair of mitral valve using multiple leaflet clips in 07/2019 w/ Dr. Rojas. Pt unable to recall her home meds, however per S[facility] has prescription for torsemide 20mg qd.  - c/w torsemide 20mg PO qd  - formal med rec in AM Pt reports hx of "massive GI bleed" ~3 years ago at [facility], per pt she had EGD vs colonoscopy but was not given a diagnosis/source of the bleeding. No recent prescriptions in Surescripts for protonix, however pt was on it during her last admission in 2019.  - avoid NSAIDs  - formal med rec in AM

## 2024-02-21 NOTE — H&P ADULT - PROBLEM SELECTOR PLAN 5
F: None   E: Replete as necessary K>4 Mg>2  N: soft and bite sized DASH diet  DVT Prophylaxis: eliquis  GI prophylaxis: None   CODE STATUS: FULL CODE  Dispo: [facility] TTE 06/2023 EF 55-60%, mildly dilated LV, mildly dilated RV, mildly reduced RV systolic function, severe biatrial enlargement, severe MR, severe TR, pulmonary HTN, two YUDITH devices in stable position across A2-P2 (unchanged singe 06/2023). Pt underwent percutaneous transcatheter repair of mitral valve using multiple leaflet clips in 07/2019 w/ Dr. Rojas. Home meds: torsemide 20mg qd. Follows w/ Dr. Ruiz and Dr. Rojas. Torsemide was recently increased from 10mg to 20mg and ASA was recently discontinued - both managed by Dr. Ruiz, who was also considering starting spironolactone 12.5mg on next visit. Unclear if pt was compliant w/ torsemide daily as she still had BLE edema, none at this time.  - c/w torsemide 20mg PO qd

## 2024-02-21 NOTE — DIETITIAN INITIAL EVALUATION ADULT - PERTINENT LABORATORY DATA
02-21    142  |  105  |  27<H>  ----------------------------<  71  4.6   |  20<L>  |  1.27    Ca    9.5      21 Feb 2024 05:30  Phos  4.4     02-21  Mg     2.0     02-21    TPro  6.2  /  Alb  3.7  /  TBili  1.5<H>  /  DBili  x   /  AST  40  /  ALT  22  /  AlkPhos  115  02-21

## 2024-02-21 NOTE — DIETITIAN INITIAL EVALUATION ADULT - OTHER INFO
89F w/ PMH HTN, GIB, paroxysmal afib (on eliquis), HFpEF, severe MR s/p transcatheter mitral valve repair using Saulo on 07/3/19 (Clasp IID research trial), severe TR, LBP/OA, gingivitis, BIBEMS for mechanical fall that occurred after she tripped and fell. Pt states she was coming out of the elevator in her apartment building when another tenant was going into the elevator at the same time; they bumped into each other and both fell. Patient fell onto the other woman, does not remember which side of her body she fell onto, however she denies LOC, denies head trauma, denies any prodromal symptoms. Her superintendent called EMS. Pt has not attempted to ambulate since the fall.    Patient seen at bedside for nutrition assessment. Current diet order: soft and bite sized. No known food allergies. No difficulty chewing/swallowing reported. Reports good appetite, however only consumed ~50% of eggs at breakfast. Reports that she has been eating less protein within the last year and focusing on fruits and vegetables. Reports she lost weight in this time but unable to quantify or provide usual weight. Provided nutrition education discussing importance of adequate protein, food sources of protein. Offered patient oral nutrition supplement, however adamantly refused. Reports she does not like red meat but will eat fish. No dosing height or weight entered in chart, patient reports she is 5'7" tall. Recommend to obtain and document dosing height. No pressure injuries documented. No edema documented. Denies nausea/vomiting/diarrhea/constipation. Labs: elevated BUN, GFR 39. Meds: bowel regimen, torsemide. Observed with severe wasting to temples, buccals, clavicles, orbitals, and shoulders. Based on ASPEN guidelines, pt meets criteria for severe malnutrition. See nutrition recommendations below.

## 2024-02-21 NOTE — H&P ADULT - PROBLEM SELECTOR PLAN 6
F: None   E: Replete as necessary K>4 Mg>2  N: soft and bite sized DASH diet when she passes dysphagia screen  DVT Prophylaxis: eliquis  GI prophylaxis: None   CODE STATUS: FULL CODE  Dispo: [facility]

## 2024-02-21 NOTE — PATIENT PROFILE ADULT - FOOD INSECURITY
Physical Therapy  Patient not seen in therapy.     Per RN, pt very lethargic and on Bi-pap. RN reports pt would have great difficulty participating at this time. Will re-attempt as able.        OBJECTIVE                     Therapy procedure time and total treatment time can be found documented on the Time Entry flowsheet   no

## 2024-02-21 NOTE — DIETITIAN INITIAL EVALUATION ADULT - OTHER CALCULATIONS
IBW used to calculate needs since no dosing weight entered in chart. Needs adjusted for advanced age, CHF, and malnutrition. Fluid recs per team.

## 2024-02-21 NOTE — CONSULT NOTE ADULT - ASSESSMENT
{\rtf1\mimjve40501\ansi\refustx1193\ftnbj\uc1\deff0  {\fonttbl{\f0 \fnil Segoe UI;}{\f1 \fnil \fcharset0 Segoe UI;}{\f2 \fnil Times New Dustin;}}  {\colortbl ;\qqc814\ilvmc432\xiea854 ;\red0\green0\blue0 ;\red0\green0\rapn250 ;\red0\green0\blue0 ;}  {\stylesheet{\f0\fs20 Normal;}{\cs1 Default Paragraph Font;}{\cs2\f0\fs16 Line Number;}{\cs3\f2\fs24\ul\cf3 Hyperlink;}}  {\*\revtbl{Unknown;}}  \fwgerf81052\bcnmvm33166\lmbhp7040\eooll6303\rozmb4672\jxfwv7535\kprflhe262\ewnnajl427\nogrowautofit\vmfzti600\formshade\nofeaturethrottle1\dntblnsbdb\fet4\aendnotes\aftnnrlc\pgbrdrhead\pgbrdrfoot  \sectd\qkaibk73746\yeesib76259\guttersxn0\kcczaoug3998\yzzchzln7720\dlejqlqp1343\vyadiqgm1314\fbzgims580\luccsme097\sbkpage\pgncont\pgndec  \plain\plain\f0\fs24\ql\plain\f0\fs24\plain\f0\fs20\wfnd0269\hich\f0\dbch\f0\loch\f0\fs20\par  I M\par  \par  89 y o F w/ PMH HTN, GIB, paroxysmal afib (on eliquis), HFpEF, severe MR s/p transcatheter mitral valve repair using Yudith on 07/3/19 (Clasp IID research trial), severe TR, LBP/OA, gingivitis, BIBEMS for mechanical fall that occurred after she bumped   into another tenant in her building, found to have nondisplaced L greater trochanter proximal femur, admitted for ortho/PT eval and possible [facility] placement.\par  \plain\f1\fs20\mmnj5203\hich\f1\dbch\f1\loch\f1\cf2\fs20\strike\plain\f1\fs20\xsms8477\hich\f1\dbch\f1\loch\f1\cf2\fs20\plain\f0\fs20\pqrm4659\hich\f0\dbch\f0\loch\f0\fs20\par  \plain\f1\fs20\yspt8280\hich\f1\dbch\f1\loch\f1\cf2\fs20\ul{\field{\*\fldinst HYPERLINK [id number] }{\fldrslt Problem/Plan - 1:}}\plain\f0\fs20\ytxq7163\hich\f0\dbch\f0\loch\f0\fs20\ql\par  \'b7  {\*\bkmkstart [id number]}{\*\bkmkend qf66102189606}Problem: {\*\bkmkstart bj30648449085}{\*\bkmkend na21215137927}Femur fracture, left.\plain\f1\fs20\emgi1913\hich\f1\dbch\f1\loch\f1\cf2\fs20\strike\plain\f0\fs20\pprb0054\hich\f0\dbch\f0\loch\f0\fs20\par  \'b7  {\*\bkmkstart vs19170802222}{\*\bkmkend [id number]}Plan: {\*\bkmkstart fh04531799356}{\*\bkmkend fq55391697955}Pt presenting for mechanical fall after she bumped into another tenant in her building. CT head: no acute intracranial hemorrhage or   fracture; superior ophthalmic veins (R>L) increased in size since 2/1/23 (nonspecific), no hyperdense regions to suggest thrombosis. CT pelvis: cortical thickening L hemipelvis consistent w/ Paget's disease; mild degenerative changes b/l hips; fat-containing   L inguinal hernia w/o incarceration. CT L femur: nondisplaced fracture greater trochanter proximal L femur. Seen by ortho in ED. S/p 1L NS in ED.\par  - ortho recommending MRI L hip to r/o intertrochanteric extension\par  - NWB LLE until MRI\par  - PT recs\par  - pain control - tylenol 650mg PO q6h PRN, add PRN oxy 2.5 if needed -- avoid NSAIDs given h/o GIB.\plain\f1\fs20\xdly3761\hich\f1\dbch\f1\loch\f1\cf2\fs20\strike\plain\f0\fs20\yjei2933\hich\f0\dbch\f0\loch\f0\fs20\par  \par  \plain\f1\fs20\coyc0654\hich\f1\dbch\f1\loch\f1\cf2\fs20\ul{\field{\*\fldinst HYPERLINK [id number] }{\fldrslt Problem/Plan - 2:}}\plain\f0\fs20\juok3109\hich\f0\dbch\f0\loch\f0\fs20\ql\par  \'b7  {\*\bkmkstart mi02701177055}{\*\bkmkend lh07866380359}Problem: {\*\bkmkstart tx32728156288}{\*\bkmkend la16695038381}HTN (hypertension).\plain\f1\fs20\vshj2099\hich\f1\dbch\f1\loch\f1\cf2\fs20\strike\plain\f0\fs20\skrw0607\hich\f0\dbch\f0\loch\f0\fs20\par  \'b7  {\*\bkmkstart oy30729529460}{\*\bkmkend sj91616266793}Plan: {\*\bkmkstart jv52278627214}{\*\bkmkend is40467235393}Hx of HTN. Home meds: torsemide 20mg qd. BP 130s-150s/70s-90s since admission.\par  - c/w torsemide 20mg PO qd.\plain\f1\fs20\flny8280\hich\f1\dbch\f1\loch\f1\cf2\fs20\strike\plain\f0\fs20\blbl2987\hich\f0\dbch\f0\loch\f0\fs20\par  \par  \plain\f1\fs20\sbcn2803\hich\f1\dbch\f1\loch\f1\cf2\fs20\ul{\field{\*\fldinst HYPERLINK [id number] }{\fldrslt Problem/Plan - 3:}}\plain\f0\fs20\udgs0566\hich\f0\dbch\f0\loch\f0\fs20\ql\par  \'b7  {\*\bkmkstart pi35176676276}{\*\bkmkend nq16865955194}Problem: {\*\bkmkstart ex30836232022}{\*\bkmkend si60407952801}Atrial fibrillation.\plain\f1\fs20\yfxo5140\hich\f1\dbch\f1\loch\f1\cf2\fs20\strike\plain\f0\fs20\cytw9827\hich\f0\dbch\f0\loch\f0\fs20\par  \'b7  {\*\bkmkstart qd77413107247}{\*\bkmkend sv40372608873}Plan: {\*\bkmkstart rp33973669328}{\*\bkmkend wm72116424031}Hx of paroxysmal afib. Home meds: eliquis 2.5mg BID, toprol 25mg qd. Follows w/ Dr. Ruiz. EKG on admission HR 75, afib with incomplete   RBBB, QTc 469 however there is motion artifact.\par  - c/w eliquis 2.5mg PO BID\par  - c/w toprol 25mg PO qd\par  - repeat EKG in AM.\plain\f1\fs20\qyta0611\hich\f1\dbch\f1\loch\f1\cf2\fs20\strike\plain\f0\fs20\kpbb5528\hich\f0\dbch\f0\loch\f0\fs20\par  \par  \plain\f1\fs20\gxbv7831\hich\f1\dbch\f1\loch\f1\cf2\fs20\ul{\field{\*\fldinst HYPERLINK [id number] }{\fldrslt Problem/Plan - 4:}}\plain\f0\fs20\zhui9963\hich\f0\dbch\f0\loch\f0\fs20\ql\par  \'b7  {\*\bkmkstart vm56858379045}{\*\bkmkend lu01740816475}Problem: {\*\bkmkstart zu99319876778}{\*\bkmkend nt52945237669}GI bleed.\plain\f1\fs20\pgau8337\hich\f1\dbch\f1\loch\f1\cf2\fs20\strike\plain\f0\fs20\cicb4139\hich\f0\dbch\f0\loch\f0\fs20\par  \'b7  {\*\bkmkstart ra78223063553}{\*\bkmkend en1935034}Plan: {\*\bkmkstart na32193074772}{\*\bkmkend jc43851219989}Pt reports hx of "massive GI bleed" ~3 years ago at [place], per pt she had EGD vs colonoscopy but was not given a diagnosis/source   of the bleeding. No recent prescriptions in [facility] for protonix, however pt was on it during her last admission in 2019.\par  - avoid NSAIDs\par  - formal med rec in AM.\plain\f1\fs20\qvww6583\hich\f1\dbch\f1\loch\f1\cf2\fs20\strike\plain\f0\fs20\ylrr2304\hich\f0\dbch\f0\loch\f0\fs20\par  \par  \plain\f1\fs20\qcwu2285\hich\f1\dbch\f1\loch\f1\cf2\fs20\ul{\field{\*\fldinst HYPERLINK [id number] }{\fldrslt Problem/Plan - 5:}}\plain\f0\fs20\yznc2644\hich\f0\dbch\f0\loch\f0\fs20\ql\par  \'b7  {\*\bkmkstart ix69055202449}{\*\bkmkend ko68133522286}Problem: {\*\bkmkstart wq59261432127}{\*\bkmkend nx47553205400}Chronic diastolic HF (heart failure).\plain\f1\fs20\awyd5064\hich\f1\dbch\f1\loch\f1\cf2\fs20\strike\plain\f0\fs20\gmik9519\hich\f0\dbch\f0\loch\f0\fs20\par  \'b7  {\*\bkmkstart nk98573972381}{\*\bkmkend pl66947220733}Plan: {\*\bkmkstart vf81388044139}{\*\bkmkend hj10204121198}TTE 06/2023 EF 55-60%, mildly dilated LV, mildly dilated RV, mildly reduced RV systolic function, severe biatrial enlargement, severe   MR, severe TR, pulmonary HTN, two YUDITH devices in stable position across A2-P2 (unchanged singe 06/2023). Pt underwent percutaneous transcatheter repair of mitral valve using multiple leaflet clips in 07/2019 w/ Dr. Rojas. Home meds: torsemide 20mg   qd. Follows w/ Dr. Ruiz and Dr. Rojas. Torsemide was recently increased from 10mg to 20mg and ASA was recently discontinued - both managed by Dr. Ruiz, who was also considering starting spironolactone 12.5mg on next visit.\~Unclear if pt was   compliant w/ torsemide daily as she still had BLE edema, none at this time.\par  - c/w torsemide 20mg PO qd.\plain\f1\fs20\yctr7858\hich\f1\dbch\f1\loch\f1\cf2\fs20\strike\plain\f0\fs20\wjvw9548\hich\f0\dbch\f0\loch\f0\fs20\par  \par  \plain\f1\fs20\nnsc3280\hich\f1\dbch\f1\loch\f1\cf2\fs20\ul{\field{\*\fldinst HYPERLINK [id number] }{\fldrslt Problem/Plan - 6:}}\plain\f0\fs20\fkhv0367\hich\f0\dbch\f0\loch\f0\fs20\ql\par  \'b7  {\*\bkmkstart jr56276925246}{\*\bkmkend is58600791869}Problem: {\*\bkmkstart yy94453277061}{\*\bkmkend kz78888605746}Prophylactic measure. \par  \'b7  {\*\bkmkstart uy14055950754}{\*\bkmkend kg13181884218}Plan: {\*\bkmkstart yb15444852618}{\*\bkmkend zv91229102342}F: None \par  E: Replete as necessary K>4 Mg>2\par  N: soft and bite sized DASH diet when she passes dysphagia screen\par  DVT Prophylaxis: eliquis\par  GI prophylaxis: None \par  CODE STATUS: FULL CODE\par  Dispo: [facility]{\*\bkmkstart bkcommentCR}{\*\bkmkend bkcommentCR}.\par  \par  \par  \par  }

## 2024-02-21 NOTE — H&P ADULT - NSHPLABSRESULTS_GEN_ALL_CORE
.  LABS:                         14.4   8.03  )-----------( 150      ( 20 Feb 2024 21:45 )             43.7     02-20    143  |  105  |  24<H>  ----------------------------<  84  4.7   |  25  |  1.17    Ca    10.0      20 Feb 2024 21:45        Urinalysis Basic - ( 20 Feb 2024 21:45 )    Color: x / Appearance: x / SG: x / pH: x  Gluc: 84 mg/dL / Ketone: x  / Bili: x / Urobili: x   Blood: x / Protein: x / Nitrite: x   Leuk Esterase: x / RBC: x / WBC x   Sq Epi: x / Non Sq Epi: x / Bacteria: x                RADIOLOGY, EKG & ADDITIONAL TESTS: Reviewed.

## 2024-02-21 NOTE — H&P ADULT - NSHPPHYSICALEXAM_GEN_ALL_CORE
.  VITAL SIGNS:  T(F): 98.2 (02-20-24 @ 20:10), Max: 98.2 (02-20-24 @ 20:10)  HR: 74 (02-21-24 @ 01:37) (74 - 79)  BP: 150/98 (02-21-24 @ 01:37) (150/98 - 158/86)  BP(mean): --  RR: 17 (02-21-24 @ 01:37) (16 - 17)  SpO2: 98% (02-21-24 @ 01:37) (97% - 98%)    PHYSICAL EXAM:    Constitutional: resting comfortably in bed; NAD  HEENT: NC/AT, PERRL, EOMI, anicteric sclera, no nasal discharge; uvula midline, no oropharyngeal erythema or exudates; MMM  Neck: supple; no JVD or thyromegaly  Respiratory: unlabored breathing, CTA B/L; no W/Rhonchi/Crackles, no retractions or use of accessory muscles   Cardiac: +S1/S2; RRR; no M/R/G; No ventricular heaves, PMI non-displaced  Gastrointestinal: soft, NT/ND; no rebound or guarding; +BSx4  Genitourinary: normal external genitalia  Back: spine midline, no bony tenderness or step-offs; no CVAT B/L  Extremities: WWP, no clubbing or cyanosis; no peripheral edema  Musculoskeletal: NROM x4; no joint swelling, tenderness or erythema  Vascular: 2+ radial, DP/PT pulses B/L  Dermatologic: skin warm, dry and intact; no rashes, wounds, or scars  Lymphatic: no submandibular or cervical LAD  Neurologic: AAOx3; CNII-XII grossly intact; no focal deficits  Psychiatric: affect and characteristics of appearance, verbalizations, behaviors are appropriate, denies SI/HI/AH/VH

## 2024-02-21 NOTE — H&P ADULT - PROBLEM SELECTOR PLAN 1
Hx of HTN, pt unable to recall her home meds, however per Surescripts has prescription for torsemide 20mg qd. BP 130s-150s/70s-90s since admission.  - c/w torsemide 20mg PO qd  - formal med rec in AM Pt presenting for mechanical fall after she bumped into another tenant in her building. CT head: no acute intracranial hemorrhage or fracture; superior ophthalmic veins (R>L) increased in size since 2/1/23 (nonspecific), no hyperdense regions to suggest thrombosis. 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 CT L femur: nondisplaced fracture greater trochanter proximal L femur. Seen by ortho in ED. S/p 1L NS in ED.  - ortho recommending MRI L hip to r/o intertrochanteric extension  - NWB LLE until MRI  - PT recs  - pain control - tylenol 650mg PO q6h PRN, add PRN oxy 2.5 if needed -- avoid NSAIDs given h/o GIB

## 2024-02-21 NOTE — PHYSICAL THERAPY INITIAL EVALUATION ADULT - ADDITIONAL COMMENTS
Prior to admission pt reports living alone in an elevator apt, no steps to enter, indep with ADLs and mobility, no use of AD. Pt does report that she has a cane and a walker at home.

## 2024-02-21 NOTE — PHYSICAL THERAPY INITIAL EVALUATION ADULT - PERTINENT HX OF CURRENT PROBLEM, REHAB EVAL
89F w/ PMH HTN, GIB, paroxysmal afib (on eliquis), HFpEF, severe MR s/p transcatheter mitral valve repair using Saulo on 07/3/19 (Clasp IID research trial), severe TR, LBP/OA, gingivitis, BIBEMS for mechanical fall that occurred after she bumped into another tenant in her building, found to have nondisplaced L greater trochanter proximal femur, admitted for ortho/PT eval and possible BRISA placement.

## 2024-02-21 NOTE — H&P ADULT - ASSESSMENT
89F w/ PMH HTN, GIB, afib on eliquis, chronic diastolic CHF (EF 60%), BIBEMS for mechanical fall that occurred after she bumped into another tenant in her building, found to have nondisplaced L greater trochanter proximal femur. 89F w/ PMH HTN, GIB, paroxysmal afib (on eliquis), HFpEF, severe MR s/p transcatheter mitral valve repair using Saulo on 07/3/19 (Clasp IID research trial), severe TR, LBP/OA, gingivitis, BIBEMS for mechanical fall that occurred after she bumped into another tenant in her building, found to have nondisplaced L greater trochanter proximal femur, admitted for ortho/PT eval and possible [facility] placement.

## 2024-02-21 NOTE — DIETITIAN INITIAL EVALUATION ADULT - PROBLEM SELECTOR PLAN 5
TTE 06/2023 EF 55-60%, mildly dilated LV, mildly dilated RV, mildly reduced RV systolic function, severe biatrial enlargement, severe MR, severe TR, pulmonary HTN, two YUDITH devices in stable position across A2-P2 (unchanged singe 06/2023). Pt underwent percutaneous transcatheter repair of mitral valve using multiple leaflet clips in 07/2019 w/ Dr. Rojas. Home meds: torsemide 20mg qd. Follows w/ Dr. Ruiz and Dr. Rojas. Torsemide was recently increased from 10mg to 20mg and ASA was recently discontinued - both managed by Dr. Ruiz, who was also considering starting spironolactone 12.5mg on next visit. Unclear if pt was compliant w/ torsemide daily as she still had BLE edema, none at this time.  - c/w torsemide 20mg PO qd

## 2024-02-21 NOTE — PATIENT PROFILE ADULT - FALL HARM RISK - HARM RISK INTERVENTIONS

## 2024-02-21 NOTE — DIETITIAN INITIAL EVALUATION ADULT - PROBLEM SELECTOR PLAN 1
Pt presenting for mechanical fall after she bumped into another tenant in her building. CT head: no acute intracranial hemorrhage or fracture; superior ophthalmic veins (R>L) increased in size since 2/1/23 (nonspecific), no hyperdense regions to suggest thrombosis. 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 CT L femur: nondisplaced fracture greater trochanter proximal L femur. Seen by ortho in ED. S/p 1L NS in ED.  - ortho recommending MRI L hip to r/o intertrochanteric extension  - NWB LLE until MRI  - PT recs  - pain control - tylenol 650mg PO q6h PRN, add PRN oxy 2.5 if needed -- avoid NSAIDs given h/o GIB

## 2024-02-21 NOTE — DIETITIAN INITIAL EVALUATION ADULT - NUTRITION CONSULT
Clear bilaterally, pupils equal, round and reactive to light. yes Clear bilaterally, pupils equal, round

## 2024-02-21 NOTE — DIETITIAN INITIAL EVALUATION ADULT - PROBLEM SELECTOR PLAN 2
Hx of HTN. Home meds: torsemide 20mg qd. BP 130s-150s/70s-90s since admission.  - c/w torsemide 20mg PO qd

## 2024-02-21 NOTE — PHYSICAL THERAPY INITIAL EVALUATION ADULT - IMPAIRMENTS FOUND, PT EVAL
aerobic capacity/endurance/ergonomics and body mechanics/gait, locomotion, and balance/gross motor/muscle strength/neuromotor development and sensory integration/poor safety awareness/ROM

## 2024-02-21 NOTE — DIETITIAN INITIAL EVALUATION ADULT - PROBLEM SELECTOR PLAN 4
Pt reports hx of "massive GI bleed" ~3 years ago at [place], per pt she had EGD vs colonoscopy but was not given a diagnosis/source of the bleeding. No recent prescriptions in Surescripts for protonix, however pt was on it during her last admission in 2019.  - avoid NSAIDs  - formal med rec in AM
Dr. alicia

## 2024-02-21 NOTE — DIETITIAN INITIAL EVALUATION ADULT - PROBLEM SELECTOR PLAN 6
F: None   E: Replete as necessary K>4 Mg>2  N: soft and bite sized DASH diet when she passes dysphagia screen  DVT Prophylaxis: eliquis  GI prophylaxis: None   CODE STATUS: FULL CODE  Dispo: [facility]

## 2024-02-21 NOTE — H&P ADULT - PROBLEM SELECTOR PLAN 3
Pt reports hx of "massive GI bleed" ~3 years ago at [place], per pt she had EGD vs colonoscopy but was not given a diagnosis/source of the bleeding. No recent prescriptions in Surescripts for protonix, however pt was on it during her last admission in 2019.  - formal med rec in AM Hx of paroxysmal afib. Home meds: eliquis 2.5mg BID, toprol 25mg qd. Follows w/ Dr. Ruiz. EKG on admission HR 75, afib with incomplete RBBB, QTc 469 however there is motion artifact.  - c/w eliquis 2.5mg PO BID  - c/w toprol 25mg PO qd  - repeat EKG in AM

## 2024-02-21 NOTE — DIETITIAN INITIAL EVALUATION ADULT - PROBLEM SELECTOR PROBLEM 2
HTN (hypertension) Enbrel Counseling:  I discussed with the patient the risks of etanercept including but not limited to myelosuppression, immunosuppression, autoimmune hepatitis, demyelinating diseases, lymphoma, and infections.  The patient understands that monitoring is required including a PPD at baseline and must alert us or the primary physician if symptoms of infection or other concerning signs are noted.

## 2024-02-21 NOTE — H&P ADULT - NSHPSOCIALHISTORY_GEN_ALL_CORE
Lives by herself in an apartment in [place], has been  before but chooses to be single now, does not have any family in [place], no children. Has family in [place] and [place]. Retired. Has walkers/canes at home but reportedly ambulates independently.

## 2024-02-22 NOTE — PROGRESS NOTE ADULT - SUBJECTIVE AND OBJECTIVE BOX
INTERVAL HPI/OVERNIGHT EVENTS:  Patient was seen and examined at bedside. As per nurse and patient, no o/n events, patient resting comfortably. Pt sleeping on exam this AM .   VITAL SIGNS:  T(F): 97.5 (02-22-24 @ 09:10)  HR: 76 (02-22-24 @ 09:10)  BP: 125/89 (02-22-24 @ 09:10)  RR: 18 (02-22-24 @ 09:10)  SpO2: 99% (02-22-24 @ 09:10)  Wt(kg): --        PHYSICAL EXAM:    GENERAL: NAD, lying in bed comfortably  NECK: Supple, trachea midline, no JVD  HEART: Regular rate and rhythm, no murmurs, rubs, or gallops  LUNGS: Unlabored respirations.  Clear to auscultation bilaterally, no crackles, wheezing, or rhonchi  ABDOMEN: Soft, nontender, nondistended, +BS  EXTREMITIES: 2+ peripheral pulses bilaterally. No clubbing, cyanosis, or edema  NERVOUS SYSTEM:  A&Ox3, moving all extremities, no focal deficits   SKIN: No rashes or lesions    MEDICATIONS  (STANDING):  acetaminophen     Tablet .. 975 milliGRAM(s) Oral every 6 hours  latanoprost 0.005% Ophthalmic Solution 1 Drop(s) Both EYES at bedtime  lidocaine   4% Patch 1 Patch Transdermal daily  metoprolol succinate ER 25 milliGRAM(s) Oral every 24 hours  polyethylene glycol 3350 17 Gram(s) Oral every 12 hours  senna 2 Tablet(s) Oral at bedtime  torsemide 20 milliGRAM(s) Oral every 24 hours    MEDICATIONS  (PRN):  oxyCODONE    IR 2.5 milliGRAM(s) Oral every 6 hours PRN Severe Pain (7 - 10)      Allergies    penicillin (Hives; Blisters)    Intolerances        LABS:                        14.2   7.10  )-----------( 123      ( 21 Feb 2024 05:30 )             43.5     02-21    142  |  105  |  27<H>  ----------------------------<  71  4.6   |  20<L>  |  1.27    Ca    9.5      21 Feb 2024 05:30  Phos  4.4     02-21  Mg     2.0     02-21    TPro  6.2  /  Alb  3.7  /  TBili  1.5<H>  /  DBili  x   /  AST  40  /  ALT  22  /  AlkPhos  115  02-21      Urinalysis Basic - ( 21 Feb 2024 05:30 )    Color: x / Appearance: x / SG: x / pH: x  Gluc: 71 mg/dL / Ketone: x  / Bili: x / Urobili: x   Blood: x / Protein: x / Nitrite: x   Leuk Esterase: x / RBC: x / WBC x   Sq Epi: x / Non Sq Epi: x / Bacteria: x        RADIOLOGY & ADDITIONAL TESTS:  Reviewed

## 2024-02-22 NOTE — OCCUPATIONAL THERAPY INITIAL EVALUATION ADULT - GENERAL OBSERVATIONS, REHAB EVAL
Pt received semi-supine in bed, +heplock, +primafit, NAD, and agreeable to OT. Cleared by DEEPIKA Leija to see.

## 2024-02-22 NOTE — OCCUPATIONAL THERAPY INITIAL EVALUATION ADULT - NS ASR FOLLOW COMMAND OT EVAL
required reinforcement to attend to task 2/2 perseverating/100% of the time/able to follow single-step instructions

## 2024-02-22 NOTE — PROGRESS NOTE ADULT - PROBLEM SELECTOR PLAN 3
Hx of paroxysmal afib. Home meds: eliquis 2.5mg BID, toprol 25mg qd. Follows w/ Dr. Ruiz. EKG on admission HR 75, afib with incomplete RBBB, QTc 469 however there is motion artifact.  -  hold eliquis 2.5mg PO BID iso potential surgery  - start lovenox dvt pxp   - c/w toprol 25mg PO qd

## 2024-02-22 NOTE — PROGRESS NOTE ADULT - SUBJECTIVE AND OBJECTIVE BOX
Physical Medicine and Rehabilitation Progress Note :       Patient is a 89y old  Female who presents with a chief complaint of CLOSED TROCHANTERIC FRACTURE OF HIP     (21 Feb 2024 12:28)      HPI:  89F w/ PMH HTN, GIB, paroxysmal afib (on eliquis), HFpEF, severe MR s/p transcatheter mitral valve repair using Saulo on 07/3/19 (Clasp IID research trial), severe TR, LBP/OA, gingivitis, BIBEMS for mechanical fall that occurred after she tripped and fell. Pt states she was coming out of the elevator in her apartment building when another tenant was going into the elevator at the same time; they bumped into each other and both fell. Patient fell onto the other woman, does not remember which side of her body she fell onto, however she denies LOC, denies head trauma, denies any prodromal symptoms. Her superintendent called EMS. Pt has not attempted to ambulate since the fall. Denies LLE pain at rest but has sharp pain when she moves. No other complaints at this time. Was in her usual state of health prior to the fall.    ED course:  Vitals: afebrile, HR 79, /86, RR 16, SpO2 97% on RA.  Labs: CBC wnl, BUN/Cr 24/1.17, eGFR 43.  EKG: afib with incomplete RBBB  Imaging:   *CT head: no acute intracranial hemorrhage or fracture; superior ophthalmic veins (R>L) increased in size since 2/1/23 (nonspecific), no hyperdense regions to suggest thrombosis.  *CT pelvis: cortical thickening L hemipelvis consistent w/ Paget's disease; mild degenerative changes b/l hips; fat-containing L inguinal hernia w/o incarceration.  *CT L femur: nondisplaced fracture greater trochanter proximal L femur.  Interventions:  cc x1  Consults: ortho (21 Feb 2024 01:50)                            15.4   8.58  )-----------( 155      ( 22 Feb 2024 11:09 )             46.6       02-21    142  |  105  |  27<H>  ----------------------------<  71  4.6   |  20<L>  |  1.27    Ca    9.5      21 Feb 2024 05:30  Phos  4.4     02-21  Mg     2.0     02-21    TPro  6.2  /  Alb  3.7  /  TBili  1.5<H>  /  DBili  x   /  AST  40  /  ALT  22  /  AlkPhos  115  02-21    Vital Signs Last 24 Hrs  T(C): 36.4 (22 Feb 2024 09:10), Max: 36.7 (21 Feb 2024 15:49)  T(F): 97.5 (22 Feb 2024 09:10), Max: 98 (21 Feb 2024 15:49)  HR: 76 (22 Feb 2024 09:10) (66 - 82)  BP: 125/89 (22 Feb 2024 09:10) (125/89 - 142/93)  BP(mean): --  RR: 18 (22 Feb 2024 09:10) (17 - 18)  SpO2: 99% (22 Feb 2024 09:10) (97% - 99%)    Parameters below as of 22 Feb 2024 09:10  Patient On (Oxygen Delivery Method): room air        MEDICATIONS  (STANDING):  acetaminophen     Tablet .. 975 milliGRAM(s) Oral every 6 hours  latanoprost 0.005% Ophthalmic Solution 1 Drop(s) Both EYES at bedtime  lidocaine   4% Patch 1 Patch Transdermal daily  metoprolol succinate ER 25 milliGRAM(s) Oral every 24 hours  polyethylene glycol 3350 17 Gram(s) Oral every 12 hours  senna 2 Tablet(s) Oral at bedtime  torsemide 20 milliGRAM(s) Oral every 24 hours    MEDICATIONS  (PRN):  oxyCODONE    IR 2.5 milliGRAM(s) Oral every 6 hours PRN Severe Pain (7 - 10)          Initial Functional Status Assessment :       Previous Level of Function:     · Ambulation Skills	independent  · Transfer Skills	independent  · ADL Skills	independent  · Work/Leisure Activity	independent  · Additional Comments	Prior to admission pt reports living alone in an elevator apt, no steps to enter, indep with ADLs and mobility, no use of AD. Pt does report that she has a cane and a walker at home.    Cognitive Status Examination:   · Orientation	oriented to person, place, time and situation  · Level of Consciousness	confused; alert  · Follows Commands and Answers Questions	100% of the time  · Personal Safety and Judgment	impaired  · Short Term Memory	impaired    Range of Motion Exam:   · Range of Motion Examination	bilateral upper extremity ROM was WFL (within functional limits); bilateral lower extremity ROM was WFL (within functional limits)    Manual Muscle Testing:   · Manual Muscle Testing Results	no strength deficits were identified    Bed Mobility: Sit to Supine:     · Level of [place]	maximum assist (25% patients effort)  · Physical Assist/Nonphysical Assist	1 person assist    Bed Mobility: Supine to Sit:     · Level of [place]	maximum assist (25% patients effort)  · Physical Assist/Nonphysical Assist	1 person assist    Transfer: Sit to Stand:     · Level of [place]	unable to perform; 2/2 pain    Balance Skills Assessment:     · Sitting Balance: Static	good balance  · Sitting Balance: Dynamic	fair balance  · Systems Impairment Contributing to Balance Disturbance	musculoskeletal  · Identified Impairments Contributing to Balance Disturbance	pain; decreased strength    Sensory Examination:   Sensory Examination:    Grossly Intact:   · Gross Sensory Examination	Grossly Intact      Clinical Impressions:   · Criteria for Skilled Therapeutic Interventions	impairments found; anticipated discharge recommendation; functional limitations in following categories; risk reduction/prevention; rehab potential; therapy frequency; predicted duration of therapy intervention; anticipated equipment needs at discharge  · Impairments Found (describe specific impairments)	aerobic capacity/endurance; ergonomics and body mechanics; poor safety awareness; gait, locomotion, and balance; gross motor; muscle strength; neuromotor development and sensory integration; ROM  · Functional Limitations in Following Categories (describe specific limitations)	self-care; home management; community/leisure  · Risk Reduction/Prevention (Describe Specific Areas of risk reduction/prevention)	risk factors  · Risk Areas	fall            PM&R Impression : as above    Current disposition plan recommendation :    subacute rehab placement

## 2024-02-22 NOTE — PROGRESS NOTE ADULT - SUBJECTIVE AND OBJECTIVE BOX
24HR EVENTS:     SUBJECTIVE: Pt seen and examined on morning rounds.   Denies CP, SOB, N/V, new onset motor/sensory deficits    Vital Signs Last 24 Hrs  T(C): 36.4 (22 Feb 2024 05:32), Max: 36.7 (21 Feb 2024 15:49)  T(F): 97.5 (22 Feb 2024 05:32), Max: 98 (21 Feb 2024 15:49)  HR: 82 (22 Feb 2024 05:32) (66 - 82)  BP: 131/91 (22 Feb 2024 05:32) (129/83 - 142/93)  BP(mean): --  RR: 18 (22 Feb 2024 05:32) (17 - 18)  SpO2: 97% (22 Feb 2024 05:32) (97% - 97%)    Parameters below as of 22 Feb 2024 05:32  Patient On (Oxygen Delivery Method): room air        Physical Exam:  General: NAD, resting comfortably in bed  Focused L hip examination  TTP @ L GT   pain with log roll   silt sural/saph/dpn/spn/tib   5/5 ehl/fhl/ta/gs     Labs:                        14.2   7.10  )-----------( 123      ( 21 Feb 2024 05:30 )             43.5     21 Feb 2024 05:30    142    |  105    |  27     ----------------------------<  71     4.6     |  20     |  1.27     Ca    9.5        21 Feb 2024 05:30  Phos  4.4       21 Feb 2024 05:30  Mg     2.0       21 Feb 2024 05:30    TPro  6.2    /  Alb  3.7    /  TBili  1.5    /  DBili  x      /  AST  40     /  ALT  22     /  AlkPhos  115    21 Feb 2024 05:30          Assessment/Plan:  89yF w/ non displaced L GT Fx best appreciated on CT L hip. Ortho recommends MRI of L hip to r/o intertrochanteric extension. NWB LLE until MRI done (unless patient refuses MRI, then page ortho to discuss Weight bearing status)     Truman Meier, PGY-3  Orthopedic Surgery

## 2024-02-22 NOTE — OCCUPATIONAL THERAPY INITIAL EVALUATION ADULT - DIAGNOSIS, OT EVAL
Pt admitted s/p nondisplaced L greater trochanter proximal femur and presents with generalized deconditioning, decreased balance, activity tolerance, and postural control impacting her independence in ADLs and functional mobility tasks.

## 2024-02-22 NOTE — OCCUPATIONAL THERAPY INITIAL EVALUATION ADULT - MODIFIED CLINICAL TEST OF SENSORY INTEGRATION IN BALANCE TEST
able to sit at EOB for 10 minutes, demonstrating fair static/poor dynamic sitting balance 2/2 severe pain in LLE and decreased postural control

## 2024-02-22 NOTE — PROGRESS NOTE ADULT - ASSESSMENT
Ortho    89 y o F w/ non displaced L GT Fx best appreciated on CT L hip. Ortho recommends MRI of L hip to r/o intertrochanteric extension. NWB LLE until MRI done (unless patient refuses MRI, then page ortho to discuss Weight bearing status)

## 2024-02-22 NOTE — OCCUPATIONAL THERAPY INITIAL EVALUATION ADULT - ADDITIONAL COMMENTS
Pt reports living alone in an elevator accessible apartment, no NEVAEH. Pt states she was independent with ADLs and mobility tasks, no use of AD. Pt does report that she has a cane and a walker at home.

## 2024-02-22 NOTE — PROGRESS NOTE ADULT - PROBLEM SELECTOR PLAN 6
F: None   E: Replete as necessary K>4 Mg>2  N: soft and bite sized DASH diet when she passes dysphagia screen  DVT Prophylaxis: eliquis  GI prophylaxis: None   CODE STATUS: FULL CODE  Dispo: [facility]

## 2024-02-23 NOTE — PROGRESS NOTE ADULT - SUBJECTIVE AND OBJECTIVE BOX
INTERVAL HPI/OVERNIGHT EVENTS:  Patient was seen and examined at bedside. As per nurse and patient, no o/n events, patient resting comfortably. No complaints at this time. Patient denies: fever, chills, lightheadedness, weakness, CP, palpitations, SOB, cough, N/V. ROS otherwise negative. Pt states leg pain as improved a little.       VITAL SIGNS:  T(F): 97.8 (02-23-24 @ 08:42)  HR: 61 (02-23-24 @ 08:42)  BP: 124/87 (02-23-24 @ 08:42)  RR: 17 (02-23-24 @ 08:42)  SpO2: 97% (02-23-24 @ 08:42)  Wt(kg): --        PHYSICAL EXAM:      GENERAL: NAD, lying in bed comfortably  NECK: Supple, trachea midline, no JVD  HEART: Regular rate and rhythm, no murmurs, rubs, or gallops  LUNGS: Unlabored respirations.  Clear to auscultation bilaterally, no crackles, wheezing, or rhonchi  ABDOMEN: Soft, nontender, nondistended, +BS  EXTREMITIES: 2+ peripheral pulses bilaterally. No clubbing, cyanosis, or edema  NERVOUS SYSTEM:  A&Ox3, moving all extremities, no focal deficits   SKIN: No rashes or lesions      MEDICATIONS  (STANDING):  acetaminophen     Tablet .. 975 milliGRAM(s) Oral every 6 hours  gabapentin Solution 100 milliGRAM(s) Oral three times a day  latanoprost 0.005% Ophthalmic Solution 1 Drop(s) Both EYES at bedtime  lidocaine   4% Patch 1 Patch Transdermal daily  metoprolol succinate ER 25 milliGRAM(s) Oral every 24 hours  polyethylene glycol 3350 17 Gram(s) Oral every 12 hours  senna 2 Tablet(s) Oral at bedtime  torsemide 20 milliGRAM(s) Oral every 24 hours    MEDICATIONS  (PRN):  oxyCODONE    IR 2.5 milliGRAM(s) Oral every 6 hours PRN Severe Pain (7 - 10)      Allergies    penicillin (Hives; Blisters)    Intolerances        LABS:                        15.8   7.22  )-----------( 148      ( 23 Feb 2024 05:30 )             47.9     02-23    141  |  106  |  36<H>  ----------------------------<  95  4.3   |  23  |  1.42<H>    Ca    8.9      23 Feb 2024 05:30  Phos  4.2     02-23  Mg     2.0     02-23    TPro  5.9<L>  /  Alb  3.5  /  TBili  1.0  /  DBili  x   /  AST  47<H>  /  ALT  20  /  AlkPhos  103  02-23      Urinalysis Basic - ( 23 Feb 2024 05:30 )    Color: x / Appearance: x / SG: x / pH: x  Gluc: 95 mg/dL / Ketone: x  / Bili: x / Urobili: x   Blood: x / Protein: x / Nitrite: x   Leuk Esterase: x / RBC: x / WBC x   Sq Epi: x / Non Sq Epi: x / Bacteria: x        RADIOLOGY & ADDITIONAL TESTS:  Reviewed

## 2024-02-23 NOTE — DISCHARGE NOTE PROVIDER - PROVIDER TOKENS
PROVIDER:[TOKEN:[[id number]],SCHEDULEDAPPT:[03/20/2024],SCHEDULEDAPPTTIME:[03:45 PM]] PROVIDER:[TOKEN:[[id number]],SCHEDULEDAPPT:[03/20/2024],SCHEDULEDAPPTTIME:[03:45 PM]],PROVIDER:[TOKEN:[[id number]],FOLLOWUP:[1 week]]

## 2024-02-23 NOTE — DISCHARGE NOTE PROVIDER - CARE PROVIDERS DIRECT ADDRESSES
[email or web address]

## 2024-02-23 NOTE — PROGRESS NOTE ADULT - PROBLEM SELECTOR PLAN 1
Pt presenting for mechanical fall after she bumped into another tenant in her building. CT head: no acute intracranial hemorrhage or fracture; superior ophthalmic veins (R>L) increased in size since 2/1/23 (nonspecific), no hyperdense regions to suggest thrombosis. 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 CT L femur: nondisplaced fracture greater trochanter proximal L femur. Seen by ortho in ED. S/p 1L NS in ED.  - ortho recommending MRI L hip to r/o intertrochanteric extension  - NWB LLE until MRI- pt refusing MRI   - PT recs  - pain control - tylenol 650mg PO q6h PRN, add PRN oxy 2.5 if needed -- avoid NSAIDs given h/o GIB

## 2024-02-23 NOTE — DISCHARGE NOTE PROVIDER - NSDCCPCAREPLAN_GEN_ALL_CORE_FT
PRINCIPAL DISCHARGE DIAGNOSIS  Diagnosis: Fracture of greater trochanter  Assessment and Plan of Treatment: Treatment:   1. Pain managment: acetominophen, lidocaine patch, oxycotin 2.5 BID prn as needed  2. Physical therapy with [facility] of LLE  2. Subacute rehab  You were found the a left great trochcanter fracture of the left femur after a fall. The fracture was confirmed on CT scan. It was recomended that you recieve an MRI to determine the extent of the fracture and determine if surgical intervention was necessary.   After much conversation discussing the risks and benefits of the MRI scan you demonstrated that you understood the implications of refusing MRI.  Despite extensive discussion regarding the benefits of receiving the MRI, you decided to not proceed with imaging to assess the extent of the fracture and determine the need for surgical intervention. The physcian explained the risks, benefits, and alternatives to treatment. You understood your condition and the risks of not having imaging, including but not limited to:   - infection   -hemorrhage  -poor pain management   -permanent disability   -death   You had the opportunity to ask questions about your medical condition.     PRINCIPAL DISCHARGE DIAGNOSIS  Diagnosis: Fracture of greater trochanter  Assessment and Plan of Treatment: Treatment:   1. Pain managment: acetominophen, lidocaine patch, as needed  2. Physical therapy with [facility] of [facility]  You were found the a left great trochcanter fracture of the left femur after a fall. The fracture was confirmed on CT scan. It was recomended that you recieve an MRI to determine the extent of the fracture and determine if surgical intervention was necessary.   After much conversation discussing the risks and benefits of the MRI scan you demonstrated that you understood the implications of refusing MRI.  Despite extensive discussion regarding the benefits of receiving the MRI, you decided to not proceed with imaging to assess the extent of the fracture and determine the need for surgical intervention. The physcian explained the risks, benefits, and alternatives to treatment. You understood your condition and the risks of not having imaging, including but not limited to:   - infection   -hemorrhage  -poor pain management   -permanent disability   -death   You had the opportunity to ask questions about your medical condition.     PRINCIPAL DISCHARGE DIAGNOSIS  Diagnosis: Fracture of greater trochanter  Assessment and Plan of Treatment: Treatment:   1. Pain managment: acetominophen, lidocaine patch, as needed  2. Physical therapy with [facility]  of L[facility]  You were found the a left great trochcanter fracture of the left femur after a fall. The fracture was confirmed on CT scan. It was recomended that you recieve an MRI to determine the extent of the fracture and determine if surgical intervention was necessary. An MRI was performed demonstrating the need for surgical intervention. An open reduction and internal fixation of left hip using locking intramedullary mando was perfomred with orthopaedic surgery.         SECONDARY DISCHARGE DIAGNOSES  Diagnosis: Atrial fibrillation  Assessment and Plan of Treatment: Please continue to take eliquis 2.5 mg BID and Toprol 25 mg qd.   Please follow up with your cardiologist    Diagnosis: Low serum vitamin D  Assessment and Plan of Treatment: Your vitamin D levels were found to be low.   Please take 1 Vitamin D supplement 1x per day.   Please follow up with nathan PCP    Diagnosis: Chronic diastolic HF (heart failure)  Assessment and Plan of Treatment: While at [facility] your torsemide was decreased from 20 to 10.   Please continue to take torsemide 10mg daily.  Please follow up with dr. Ruiz and Dr. Rojas .    Diagnosis: HTN (hypertension)  Assessment and Plan of Treatment: While at [facility] your torsemide was decreased form 20 to 10.   Please continue to take torsemide 10mg daily.     PRINCIPAL DISCHARGE DIAGNOSIS  Diagnosis: Fracture of greater trochanter  Assessment and Plan of Treatment: Treatment:   1. Pain managment: acetominophen, lidocaine patch, as needed  2. Physical therapy with [facility]  of [facility]  You were found the a left great trochcanter fracture of the left femur after a fall. The fracture was confirmed on CT scan. It was recomended that you recieve an MRI to determine the extent of the fracture and determine if surgical intervention was necessary. An MRI was performed demonstrating the need for surgical intervention. An open reduction and internal fixation of left hip using locking intramedullary mando was perfomred with orthopaedic surgery.         SECONDARY DISCHARGE DIAGNOSES  Diagnosis: Urinary retention  Assessment and Plan of Treatment:   Acute Urinary Retention, Male  Acute urinary retention is a condition in which a person is unable to pass urine. This can last for a short time or for a long time. If left untreated, it can result in kidney damage or other serious complications.  What are the causes?  This condition may be caused by:  Obstruction or narrowing of the tube that drains the bladder (urethra). This may be caused by surgery or problems with nearby organs, such as the prostate gland, which can press or squeeze the urethra.  Problems with the nerves in the bladder. These can be caused by diseases, such as multiple sclerosis, or by spinal cord injuries.  Certain medicines.  Tumors in the area of the pelvis, bladder, or urethra.  Diabetes.  Degenerative cognitive conditions such as delirium or dementia.  Bladder or urinary tract infection.  Constipation.  Blood in the urine (hematuria).  Injury to the bladder or urethra.   Psychological (psychogenic) conditions. Someone may hold his urine due to trauma or because he does not want to use the bathroom.  What increases the risk?  This condition is more likely to develop in older men. As men age, their prostate may become larger and may start pressing or squeezing on the bladder or the urethra.  What are the signs or symptoms?  Symptoms of this condition include:  Trouble urinating.  Pain in the lower abdomen.  Symptoms usually come on slowly over a long period of time.  How is this diagnosed?  This condition is diagnosed based on a physical exam and a medical history. You may also have other tests, including:  An ultrasound of the bladder or kidneys or both.  Blood tests.  A urine analysis.  Additional tests may be needed such as an MRI, kidney, or bladder function tests.  How is this treated?  Treatment for this condition may include:  Medicines.  Placing a thin, sterile tube (catheter) into the bladder to drain urine out of the body. This is called an indwelling urinary catheter. After being inserted, the catheter is held in place with a small balloon that is dustin    Diagnosis: Aragon catheter present  Assessment and Plan of Treatment: Pt failed TOV, aragon catheter placed. Please attempt TOV at [facility] tomorrow.   Aragon Catheter Care, Adult  A Aragon catheter is a soft, flexible tube that is placed into the bladder to drain urine. A Aragon catheter may be inserted if:  You leak urine or are not able to control when you urinate (urinary incontinence).  You are not able to urinate when you need to (urinary retention).   You had prostate surgery or surgery on the genitals.  You have certain medical conditions, such as multiple sclerosis, dementia, or a spinal cord injury.  If you are going home with a Aragon catheter in place, follow the instructions below.  TAKING CARE OF THE CATHETER   Wash your hands with soap and water.   Using mild soap and warm water on a clean washcloth:  Clean the area on your body closest to the catheter insertion site using a circular motion, moving away from the catheter. Never wipe toward the catheter because this could sweep bacteria up into the urethra and cause infection.   Remove all traces of soap. Pat the area dry with a clean towel. For males, reposition the foreskin.    Attach the catheter to your leg so there is no tension on the catheter. Use adhesive tape or a leg strap. If you are using adhesive tape, remove any sticky residue left behind by the previous tape you used.   Keep the drainage bag below the level of the bladder, but keep it off the floor.   Check throughout the day to be sure the catheter is working and urine is draining freely. Make sure the tubing does not become kinked.  Do not pull on the catheter or try to remove it. Pulling could damage internal tissues.  TAKING CARE OF THE DRAINAGE BAGS  You will be given two drainage bags to take home. One is a large overnight drainage bag, and the other is a smaller leg bag that fits underneath clothing. You may wear the overnight bag at any time, but you should never wear the smaller leg bag at night. Follow the instructions below for how to empty, change, and clean your drainage bags.  Emptying the Drainage Bag  You must e    Diagnosis: HTN (hypertension)  Assessment and Plan of Treatment: While at [facility] your torsemide was decreased form 20 to 10.   Please continue to take torsemide 10mg daily.    Diagnosis: Atrial fibrillation  Assessment and Plan of Treatment: Please continue to take eliquis 2.5 mg BID and Toprol 25 mg qd.   Please follow up with your cardiologist    Diagnosis: Low serum vitamin D  Assessment and Plan of Treatment: Your vitamin D levels were found to be low.   Please take 1 Vitamin D supplement 1x per day.   Please follow up with yoru PCP    Diagnosis: Chronic diastolic HF (heart failure)  Assessment and Plan of Treatment: While at [facility] your torsemide was decreased from 20 to 10.   Please continue to take torsemide 10mg daily.  Please follow up with dr. Ruiz and Dr. Rojas .

## 2024-02-23 NOTE — DISCHARGE NOTE PROVIDER - NSDCMRMEDTOKEN_GEN_ALL_CORE_FT
aspirin 81 mg oral delayed release tablet: 1 tab(s) orally once a day  docusate sodium 100 mg oral capsule: 1 cap(s) orally 3 times a day  Eliquis 2.5 mg oral tablet: 1 tab(s) orally every 12 hours   Lumigan 0.01% ophthalmic solution: 1 drop(s) to each affected eye once a day (in the evening)  Metoprolol Succinate ER 25 mg oral tablet, extended release: 1 tab(s) orally once a day  pantoprazole 40 mg oral delayed release tablet: 1 tab(s) orally once a day (before a meal)   Alpha Lipoic Acid: Alpha Lipoic Acid 600mg daily  aspirin 81 mg oral delayed release tablet: 1 tab(s) orally once a day  docusate sodium 100 mg oral capsule: 1 cap(s) orally 3 times a day  Eliquis 2.5 mg oral tablet: 1 tab(s) orally every 12 hours   ergocalciferol 1.25 mg (50,000 intl units) oral capsule: 1 cap(s) orally once a week  latanoprost 0.005% ophthalmic solution: 1 drop(s) to each affected eye once a day (at bedtime)  Lumigan 0.01% ophthalmic solution: 1 drop(s) to each affected eye once a day (in the evening)  Metoprolol Succinate ER 25 mg oral tablet, extended release: 1 tab(s) orally once a day  pantoprazole 40 mg oral delayed release tablet: 1 tab(s) orally once a day (before a meal)  polyethylene glycol 3350 oral powder for reconstitution: 17 gram(s) orally every 12 hours  senna leaf extract oral tablet: 2 tab(s) orally once a day (at bedtime)  torsemide 20 mg oral tablet: 1 tab(s) orally every 24 hours   acetaminophen 325 mg oral tablet: 3 tab(s) orally every 6 hours  Eliquis 2.5 mg oral tablet: 1 tab(s) orally every 12 hours   ergocalciferol 1.25 mg (50,000 intl units) oral capsule: 1 cap(s) orally once a week  latanoprost 0.005% ophthalmic solution: 1 drop(s) to each affected eye once a day (at bedtime)  Metoprolol Succinate ER 25 mg oral tablet, extended release: 1 tab(s) orally once a day  polyethylene glycol 3350 oral powder for reconstitution: 17 gram(s) orally every 12 hours  senna leaf extract oral tablet: 2 tab(s) orally once a day (at bedtime)  torsemide 20 mg oral tablet: 1 tab(s) orally every 24 hours   acetaminophen 325 mg oral tablet: 3 tab(s) orally every 6 hours  Eliquis 2.5 mg oral tablet: 1 tab(s) orally every 12 hours   ergocalciferol 1.25 mg (50,000 intl units) oral capsule: 1 cap(s) orally once a week  latanoprost 0.005% ophthalmic solution: 1 drop(s) to each affected eye once a day (at bedtime)  Metoprolol Succinate ER 25 mg oral tablet, extended release: 1 tab(s) orally once a day  polyethylene glycol 3350 oral powder for reconstitution: 17 gram(s) orally every 12 hours  senna leaf extract oral tablet: 2 tab(s) orally once a day (at bedtime)  torsemide 10 mg oral tablet: 1 tab(s) orally once a day   acetaminophen 325 mg oral tablet: 3 tab(s) orally every 6 hours  Alpha Lipoic Acid: Alpha Lipoic Acid 600mg daily  Eliquis 2.5 mg oral tablet: 1 tab(s) orally every 12 hours   ergocalciferol 1.25 mg (50,000 intl units) oral capsule: 1 cap(s) orally once a week  latanoprost 0.005% ophthalmic solution: 1 drop(s) to each affected eye once a day (at bedtime)  Metoprolol Succinate ER 25 mg oral tablet, extended release: 1 tab(s) orally once a day  polyethylene glycol 3350 oral powder for reconstitution: 17 gram(s) orally every 12 hours  senna leaf extract oral tablet: 2 tab(s) orally once a day (at bedtime)  torsemide 10 mg oral tablet: 1 tab(s) orally once a day   acetaminophen 325 mg oral tablet: 3 tab(s) orally every 6 hours  Alpha Lipoic Acid: Alpha Lipoic Acid 600mg daily  Eliquis 2.5 mg oral tablet: 1 tab(s) orally every 12 hours   ergocalciferol 1.25 mg (50,000 intl units) oral capsule: 1 cap(s) orally once a week  Lumigan 0.01% ophthalmic solution: 1 drop(s) to each affected eye once a day (in the evening)  Metoprolol Succinate ER 25 mg oral tablet, extended release: 1 tab(s) orally once a day  polyethylene glycol 3350 oral powder for reconstitution: 17 gram(s) orally every 12 hours  senna leaf extract oral tablet: 2 tab(s) orally once a day (at bedtime)  torsemide 10 mg oral tablet: 1 tab(s) orally once a day

## 2024-02-23 NOTE — DISCHARGE NOTE PROVIDER - NSDCFUSCHEDAPPT_GEN_ALL_CORE_FT
Gagan Ruiz  [facility] Physician [facility]  [facility] [address]  Scheduled Appointment: 03/20/2024

## 2024-02-23 NOTE — DISCHARGE NOTE PROVIDER - ATTENDING DISCHARGE PHYSICAL EXAMINATION:
I have read and agree with the resident Discharge Note above.  Patient seen and discussed with resident team on the day of discharge.     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 Avoiding NSAIDs giving hx of GIB bleed and full AC.    - decreased dose of oxy back to 2.5 and discontinued tramadol given oversedation on higher dose of pain medications, patient well controlled  - resumed AC with Eliquis  - start Vit D supplementation    #HFpEF  #Severe MR s/p SAULO repair  - cardiology consulted  - c/w home Toprol with hold parameters  - resumed home torsemide 10mg with hold parameters today, patient tolerating more PO intake  - will need outpatient cardiology follow up with Dr Ruiz and Crystal    #Afib, chronic  - resume Toprol and AC as above    Attending exam on day of discharge:   Gen: NAD, elderly female, lying in bed   HEENT: NCAT, MMM  Neck: supple, trachea at midline  CV: Irregular, Systolic murmur  Pulm: CTA B, no w/r/r, normal work of breathing  Abd: +BS, soft, NTND  : deferred  Skin: no rashes, ulcers, jaundice; intact, warm and dry  Ext: WWP, no c/c/e  MSK: 5/5 strength b/l UEs, 4+/5 LLE limited by pain, normal range of motion  Neuro: AOx3, mild forgetfulness, no change from baseline  Psych: pleasant, conversant and appropriate    I was physically present for the evaluation and management services provided. I agree with the included history, physical, and plan which I reviewed and edited where appropriate. I spent > 30 minutes with the patient on direct patient care and discharge planning with more than 50% of the visit spent on counseling and/or coordination of care.     Jesse Summers  [phone]

## 2024-02-23 NOTE — DISCHARGE NOTE PROVIDER - NSDCCPTREATMENT_GEN_ALL_CORE_FT
PRINCIPAL PROCEDURE  Procedure: CT scan  Findings and Treatment: TECHNIQUE:  Multiplanar CT images of the bony pelvis were obtained without contrast.  FINDINGS:  The bones are demineralized. There is coarsened and thickened trabeculae   at the left hemipelvis compatible with patchy disc disease. Cortical   irregularity is seen at the left femoral head neck junction and region of   the greater trochanter which is concerning for an acute nondisplaced   fracture. Hip joint spaces are maintained. No dislocation. Pubic   symphysis and sacroiliac joints are intact. Partially imaged lumbar   spondylosis. Vascular calcification is present. Gallstones in the   gallbladder. Cystic structures in the region of the left lower   hemiabdomen felt to reflect renal cysts.  IMPRESSION:  Findings suspicious for a nondisplaced fracture at the left femoral head   neck junction and region of the greater trochanter.  --- End of Report ---

## 2024-02-23 NOTE — PROGRESS NOTE ADULT - ASSESSMENT
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    Nutritional Assessment:  · Nutritional Assessment	This patient has been assessed with a concern for Malnutrition and has been determined to have a diagnosis/diagnoses of Severe protein-calorie malnutrition.    This patient is being managed with:   Diet Soft and Bite Sized-  Entered: Feb 21 2024  6:43AM    Problem/Plan - 1:  ·  Problem: Femur fracture, left.   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 CT head: no acute intracranial hemorrhage or fracture; superior ophthalmic veins (R>L) increased in size since 2/1/23 (nonspecific), no hyperdense regions to suggest thrombosis. 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 CT L femur: nondisplaced fracture greater trochanter proximal L femur. Seen by ortho in ED. S/p 1L NS in ED.  - ortho recommending MRI L hip to r/o intertrochanteric extension  - NWB LLE until MRI- pt refusing MRI   - PT recs  - pain control - tylenol 650mg PO q6h PRN, add PRN oxy 2.5 if needed -- avoid NSAIDs given h/o GIB.    Problem/Plan - 2:  ·  Problem: HTN (hypertension).   ·  Plan: Hx of HTN. Home meds: torsemide 20mg qd. BP 130s-150s/70s-90s since admission.  - c/w torsemide 20mg PO qd.    Problem/Plan - 3:  ·  Problem: Atrial fibrillation.   ·  Plan: Hx of paroxysmal afib. Home meds: eliquis 2.5mg BID, toprol 25mg qd. Follows w/ Dr. Ruiz. EKG on admission HR 75, afib with incomplete RBBB, QTc 469 however there is motion artifact.  -  restart eliquis 2.5mg PO BID iso potential surgery  - c/w toprol 25mg PO qd.    Problem/Plan - 4:  ·  Problem: GI bleed.   ·  Plan: Pt reports hx of "massive GI bleed" ~3 years ago at [place], per pt she had EGD vs colonoscopy but was not given a diagnosis/source of the bleeding. No recent prescriptions in Surescripts for protonix, however pt was on it during her last admission in 2019.  - avoid NSAIDs.    Problem/Plan - 5:  ·  Problem: Chronic diastolic HF (heart failure).   ·  Plan: TTE 06/2023 EF 55-60%, mildly dilated LV, mildly dilated RV, mildly reduced RV systolic function, severe biatrial enlargement, severe MR, severe TR, pulmonary HTN, two YUDITH devices in stable position across A2-P2 (unchanged singe 06/2023). Pt underwent percutaneous transcatheter repair of mitral valve using multiple leaflet clips in 07/2019 w/ Dr. Rojas. Home meds: torsemide 20mg qd. Follows w/ Dr. Ruiz and Dr. Rojas. Torsemide was recently increased from 10mg to 20mg and ASA was recently discontinued - both managed by Dr. Ruiz, who was also considering starting spironolactone 12.5mg on next visit. Unclear if pt was compliant w/ torsemide daily as she still had BLE edema, none at this time.  - c/w torsemide 20mg PO qd.    Problem/Plan - 6:  ·  Problem: Prophylactic measure.   ·  Plan: F: None   E: Replete as necessary K>4 Mg>2  N: soft and bite sized DASH diet when she passes dysphagia screen  DVT Prophylaxis: eliquis  GI prophylaxis: None   CODE STATUS: FULL CODE  Dispo: [facility].

## 2024-02-23 NOTE — PROGRESS NOTE ADULT - PROBLEM SELECTOR PLAN 3
Hx of paroxysmal afib. Home meds: eliquis 2.5mg BID, toprol 25mg qd. Follows w/ Dr. Ruiz. EKG on admission HR 75, afib with incomplete RBBB, QTc 469 however there is motion artifact.  -  restart eliquis 2.5mg PO BID iso potential surgery  - c/w toprol 25mg PO qd

## 2024-02-23 NOTE — DISCHARGE NOTE PROVIDER - CARE PROVIDER_API CALL
Gagan Ruiz G  Cardiology  [address] [address]  Phone: [phone]  Fax: [phone]  Scheduled Appointment: 03/20/2024 03:45 PM   Gagan Ruiz  Cardiology  [address] [address]  Phone: [phone]  Fax: [phone]  Scheduled Appointment: 03/20/2024 03:45 PM    Cas Zamorano  Orthopaedic Surgery  [address]  [place] [address]  Phone: [phone]  Fax: [phone]  Follow Up Time: 1 week

## 2024-02-23 NOTE — PROGRESS NOTE ADULT - SUBJECTIVE AND OBJECTIVE BOX
Physical Medicine and Rehabilitation Progress Note :       Patient is a 89y old  Female who presents with a chief complaint of Femur fracture  (23 Feb 2024 10:47)      HPI:  89F w/ PMH HTN, GIB, paroxysmal afib (on eliquis), HFpEF, severe MR s/p transcatheter mitral valve repair using Saulo on 07/3/19 (Clasp IID research trial), severe TR, LBP/OA, gingivitis, BIBEMS for mechanical fall that occurred after she tripped and fell. Pt states she was coming out of the elevator in her apartment building when another tenant was going into the elevator at the same time; they bumped into each other and both fell. Patient fell onto the other woman, does not remember which side of her body she fell onto, however she denies LOC, denies head trauma, denies any prodromal symptoms. Her superintendent called EMS. Pt has not attempted to ambulate since the fall. Denies LLE pain at rest but has sharp pain when she moves. No other complaints at this time. Was in her usual state of health prior to the fall.    ED course:  Vitals: afebrile, HR 79, /86, RR 16, SpO2 97% on RA.  Labs: CBC wnl, BUN/Cr 24/1.17, eGFR 43.  EKG: afib with incomplete RBBB  Imaging:   *CT head: no acute intracranial hemorrhage or fracture; superior ophthalmic veins (R>L) increased in size since 2/1/23 (nonspecific), no hyperdense regions to suggest thrombosis.  *CT pelvis: cortical thickening L hemipelvis consistent w/ Paget's disease; mild degenerative changes b/l hips; fat-containing L inguinal hernia w/o incarceration.  *CT L femur: nondisplaced fracture greater trochanter proximal L femur.  Interventions:  cc x1  Consults: ortho (21 Feb 2024 01:50)                            15.8   7.22  )-----------( 148      ( 23 Feb 2024 05:30 )             47.9       02-23    141  |  106  |  36<H>  ----------------------------<  95  4.3   |  23  |  1.42<H>    Ca    8.9      23 Feb 2024 05:30  Phos  4.2     02-23  Mg     2.0     02-23    TPro  5.9<L>  /  Alb  3.5  /  TBili  1.0  /  DBili  x   /  AST  47<H>  /  ALT  20  /  AlkPhos  103  02-23    Vital Signs Last 24 Hrs  T(C): 36.6 (23 Feb 2024 08:42), Max: 36.8 (22 Feb 2024 15:26)  T(F): 97.8 (23 Feb 2024 08:42), Max: 98.2 (22 Feb 2024 15:26)  HR: 62 (23 Feb 2024 11:12) (61 - 78)  BP: 109/73 (23 Feb 2024 11:12) (109/73 - 135/98)  BP(mean): --  RR: 17 (23 Feb 2024 08:42) (17 - 18)  SpO2: 97% (23 Feb 2024 08:42) (96% - 97%)    Parameters below as of 23 Feb 2024 08:42  Patient On (Oxygen Delivery Method): room air        MEDICATIONS  (STANDING):  acetaminophen     Tablet .. 975 milliGRAM(s) Oral every 6 hours  gabapentin Solution 100 milliGRAM(s) Oral three times a day  latanoprost 0.005% Ophthalmic Solution 1 Drop(s) Both EYES at bedtime  lidocaine   4% Patch 1 Patch Transdermal daily  metoprolol succinate ER 25 milliGRAM(s) Oral every 24 hours  polyethylene glycol 3350 17 Gram(s) Oral every 12 hours  senna 2 Tablet(s) Oral at bedtime  torsemide 20 milliGRAM(s) Oral every 24 hours    MEDICATIONS  (PRN):  oxyCODONE    IR 2.5 milliGRAM(s) Oral every 6 hours PRN Severe Pain (7 - 10)      2/22/2024 Functional Status Assessment :       Previous Level of Function:     · Bed Mobility/Transfers	independent  · Bathing	independent  · Upper Body Dressing	independent  · Lower Body Dressing	independent  · Grooming	independent  · Toileting	independent  · Eating	independent  · Home Management Skills	independent  · Additional Comments	Pt reports living alone in an elevator accessible apartment, no NEVAEH. Pt states she was independent with ADLs and mobility tasks, no use of AD. Pt does report that she has a cane and a walker at home.    · Ambulatory Devices Needed	no  · Adaptive Equipment Needed	no    Cognitive Status Examination:   · Level of Consciousness	alert; confused  · Orientation	person; place; situation  · Follow Commands/Answers Questions	100% of the time; able to follow single-step instructions; required reinforcement to attend to task 2/2 perseverating  · Personal Safety and Judgment	impaired; insight and judgement  · Short Term Memory	intact  · Long Term Memory	intact    Range of Motion Exam:   · Range of Motion Examination	no Active ROM deficits were identified  · Range of Motion Examination, Upper Extremity	bilateral UE Active ROM was WFL  (within functional limits)  · Range of Motion Examination, Lower Extremity	bilateral LE Active ROM was WFL  (within functional limits)    Manual Muscle Testing:   · Manual Muscle Testing Results	UE/RLE: 3+/5 functional mobility against gravity    Extremity Manual Muscle Testing:     · Left Lower Extremity	not tested due to  pain    Bed Mobility: Rolling/Turning:     · Level of [place]	maximum assist (25% patients effort)  · Physical Assist/Nonphysical Assist	2 person assist; nonverbal cues (demo/gestures); verbal cues    Bed Mobility: Scooting/Bridging:     · Level of [place]	maximum assist (25% patients effort)  · Physical Assist/Nonphysical Assist	2 person assist; nonverbal cues (demo/gestures); verbal cues    Bed Mobility: Sit to Supine:     · Level of [place]	maximum assist (25% patients effort)  · Physical Assist/Nonphysical Assist	2 person assist; nonverbal cues (demo/gestures); verbal cues    Bed Mobility: Supine to Sit:     · Level of [place]	maximum assist (25% patients effort)  · Physical Assist/Nonphysical Assist	2 person assist; nonverbal cues (demo/gestures); verbal cues    Bed Mobility Analysis:     · Impairments Contributing to Impaired Bed Mobility	pain; impaired balance; decreased strength    Transfer: Sit to Stand:     · Level of [place]	unable to perform; 2/2 severe pain in LLE    Transfer: Stand to Sit:     · Level of [place]	unable to perform; 2/2 severe pain in LLE    Balance Skills Assessment:     · Sitting Balance: Static	fair plus  · Sitting Balance: Dynamic	poor balance    Sensory Examination:   · Balance Skills	able to sit at EOB for 10 minutes, demonstrating fair static/poor dynamic sitting balance 2/2 severe pain in LLE and decreased postural control      Treatment Locations:   · Comments	pt denies LLE pain at rest but has sharp pain (10/10) when she moves.          PM&R Impression : as above    Current disposition plan recommendation :    subacute rehab placement

## 2024-02-23 NOTE — DISCHARGE NOTE PROVIDER - HOSPITAL COURSE
#Discharge: do not delete    89F w/ PMH HTN, GIB, paroxysmal afib (on eliquis), HFpEF, severe MR s/p transcatheter mitral valve repair using Yudith on 07/3/19 ([employer] IID research trial), severe TR, LBP/OA, gingivitis, BIBEMS for mechanical fall that occurred after she bumped into another tenant in her building, found to have nondisplaced L greater trochanter proximal femur, admitted for ortho/PT eval and possible [facility] placement. Patient refusing MRI, therefore ortho unable to assess need for surgical intervention      # Femur fracture, left.    Pt presenting for mechanical fall after she bumped into another tenant in her building. CT head: no acute intracranial hemorrhage or fracture; superior ophthalmic veins (R>L) increased in size since 2/1/23 (nonspecific), no hyperdense regions to suggest thrombosis. 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 CT L femur: nondisplaced fracture greater trochanter proximal L femur. Seen by ortho in ED. S/p 1L NS in ED.  Ortho recommending MRI L hip to r/o intertrochanteric extension, however patient continually refusing   - pain control - tylenol 650mg PO q6h PRN, add PRN oxy 2.5 if needed -- avoid NSAIDs given h/o GIB.    #HTN (hypertension).   Hx of HTN. Home meds: torsemide 20mg qd. BP 130s-150s/70s-90s since admission.  - c/w torsemide 20mg PO qd.  - f/u cardiologist     #Atrial fibrillation.   ·  Plan: Hx of paroxysmal afib. Home meds: eliquis 2.5mg BID, toprol 25mg qd. Follows w/ Dr. Ruiz. EKG on admission HR 75, afib with incomplete RBBB, QTc 469 however there is motion artifact.  - c/w eliquis 2.5mg PO BID   - c/w toprol 25mg PO qd.    # GI bleed.   Pt reports hx of "massive GI bleed" ~3 years ago at [place], per pt she had EGD vs colonoscopy but was not given a diagnosis/source of the bleeding. No recent prescriptions in [facility] for protonix, however pt was on it during her last admission in 2019.  - avoid NSAIDs.    #Chronic diastolic HF (heart failure).  TTE 06/2023 EF 55-60%, mildly dilated LV, mildly dilated RV, mildly reduced RV systolic function, severe biatrial enlargement, severe MR, severe TR, pulmonary HTN, two YUDITH devices in stable position across A2-P2 (unchanged singe 06/2023). Pt underwent percutaneous transcatheter repair of mitral valve using multiple leaflet clips in 07/2019 w/ Dr. Rojas. Home meds: torsemide 20mg qd. Follows w/ Dr. Ruiz and Dr. Rojas. Torsemide was recently increased from 10mg to 20mg and ASA was recently discontinued - both managed by Dr. Ruiz, who was also considering starting spironolactone 12.5mg on next visit. Unclear if pt was compliant w/ torsemide daily as she still had BLE edema, none at this time.  - c/w torsemide 20mg PO qd.      Patient was discharged to: (home/[facility]/acute rehab/hospice, etc, and with what services – home health PT/RN? Home O2?)  New medications:  Changes to old medications:  Medications that were stopped:   Items to follow up as outpatient:  Physical exam at the time of discharge: #Discharge: do not delete    89F w/ PMH HTN, GIB, paroxysmal afib (on eliquis), HFpEF, severe MR s/p transcatheter mitral valve repair using Yudith on 07/3/19 ([employer] IID research trial), severe TR, LBP/OA, gingivitis, BIBEMS for mechanical fall that occurred after she bumped into another tenant in her building, found to have nondisplaced L greater trochanter proximal femur, admitted for ortho/PT eval and possible [facility] placement. Patient refusing MRI, therefore ortho unable to assess need for surgical intervention      # Femur fracture, left.    Pt presenting for mechanical fall after she bumped into another tenant in her building. CT head: no acute intracranial hemorrhage or fracture; superior ophthalmic veins (R>L) increased in size since 2/1/23 (nonspecific), no hyperdense regions to suggest thrombosis. 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 CT L femur: nondisplaced fracture greater trochanter proximal L femur. Seen by ortho in ED. S/p 1L NS in ED.  Ortho recommending MRI L hip to r/o intertrochanteric extension, however patient continually refusing   - pain control - tylenol 650mg PO q6h PRN, add PRN oxy 2.5 if needed -- avoid NSAIDs given h/o GIB.    #HTN (hypertension).   Hx of HTN. Home meds: torsemide 20mg qd. BP 130s-150s/70s-90s since admission.  - c/w torsemide 20mg PO qd.  - f/u cardiologist     #Atrial fibrillation.   ·  Plan: Hx of paroxysmal afib. Home meds: eliquis 2.5mg BID, toprol 25mg qd. Follows w/ Dr. Ruiz. EKG on admission HR 75, afib with incomplete RBBB, QTc 469 however there is motion artifact.  - c/w eliquis 2.5mg PO BID   - c/w toprol 25mg PO qd.    # GI bleed.   Pt reports hx of "massive GI bleed" ~3 years ago at [place], per pt she had EGD vs colonoscopy but was not given a diagnosis/source of the bleeding. No recent prescriptions in [facility] for protonix, however pt was on it during her last admission in 2019.  - avoid NSAIDs.    #Chronic diastolic HF (heart failure).  TTE 06/2023 EF 55-60%, mildly dilated LV, mildly dilated RV, mildly reduced RV systolic function, severe biatrial enlargement, severe MR, severe TR, pulmonary HTN, two YUDITH devices in stable position across A2-P2 (unchanged singe 06/2023). Pt underwent percutaneous transcatheter repair of mitral valve using multiple leaflet clips in 07/2019 w/ Dr. Rojas. Home meds: torsemide 20mg qd. Follows w/ Dr. Ruiz and Dr. Rojas. Torsemide was recently increased from 10mg to 20mg and ASA was recently discontinued - both managed by Dr. Ruiz, who was also considering starting spironolactone 12.5mg on next visit. Unclear if pt was compliant w/ torsemide daily as she still had BLE edema, none at this time.  - c/w torsemide 20mg PO qd.      Patient was discharged to: (home/[facility]/acute rehab/hospice, etc, and with what services – home health PT/RN? Home O2?)    Items to follow up as outpatient: please follow up with orthopedics and you cardiologist   Physical exam at the time of discharge:  GENERAL: NAD, lying in bed comfortably  NECK: Supple, trachea midline, no JVD  HEART: Regular rate and rhythm, no murmurs, rubs, or gallops  LUNGS: Unlabored respirations.  Clear to auscultation bilaterally, no crackles, wheezing, or rhonchi  ABDOMEN: Soft, nontender, nondistended, +BS  EXTREMITIES: 2+ peripheral pulses bilaterally. No clubbing, cyanosis, or edema  NERVOUS SYSTEM:  A&Ox3, moving all extremities, no focal deficits   SKIN: No rashes or lesions #Discharge: do not delete    89F w/ PMH HTN, GIB, paroxysmal afib (on eliquis), HFpEF, severe MR s/p transcatheter mitral valve repair using Yudith on 07/3/19 ([employer] IID research trial), severe TR, LBP/OA, gingivitis, BIBEMS for mechanical fall that occurred after she bumped into another tenant in her building, found to have nondisplaced L greater trochanter proximal femur, admitted for ortho/PT eval and possible [facility] placement. Patient refusing MRI, therefore ortho unable to assess need for surgical intervention      # Femur fracture, left.    Pt presenting for mechanical fall after she bumped into another tenant in her building. CT head: no acute intracranial hemorrhage or fracture; superior ophthalmic veins (R>L) increased in size since 2/1/23 (nonspecific), no hyperdense regions to suggest thrombosis. 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 CT L femur: nondisplaced fracture greater trochanter proximal L femur. Seen by ortho in ED. S/p 1L NS in ED.  Ortho recommending MRI L hip to r/o intertrochanteric extension, however patient continually refusing   - pain control - tylenol 650mg PO q6h PRN, add PRN oxy 2.5 if needed -- avoid NSAIDs given h/o GIB.    #HTN (hypertension).   Hx of HTN. Home meds: torsemide 20mg qd. BP 130s-150s/70s-90s since admission.  - c/w torsemide 20mg PO qd.  - f/u cardiologist     #Atrial fibrillation.   ·  Plan: Hx of paroxysmal afib. Home meds: eliquis 2.5mg BID, toprol 25mg qd. Follows w/ Dr. Ruiz. EKG on admission HR 75, afib with incomplete RBBB, QTc 469 however there is motion artifact.  - c/w eliquis 2.5mg PO BID   - c/w toprol 25mg PO qd.    # GI bleed.   Pt reports hx of "massive GI bleed" ~3 years ago at [place], per pt she had EGD vs colonoscopy but was not given a diagnosis/source of the bleeding. No recent prescriptions in [facility] for protonix, however pt was on it during her last admission in 2019.  - avoid NSAIDs.    #Chronic diastolic HF (heart failure).  TTE 06/2023 EF 55-60%, mildly dilated LV, mildly dilated RV, mildly reduced RV systolic function, severe biatrial enlargement, severe MR, severe TR, pulmonary HTN, two YUDITH devices in stable position across A2-P2 (unchanged singe 06/2023). Pt underwent percutaneous transcatheter repair of mitral valve using multiple leaflet clips in 07/2019 w/ Dr. Rojas. Home meds: torsemide 20mg qd. Follows w/ Dr. Ruiz and Dr. Rojas. Torsemide was recently increased from 10mg to 20mg and ASA was recently discontinued - both managed by Dr. uRiz, who was also considering starting spironolactone 12.5mg on next visit. Unclear if pt was compliant w/ torsemide daily as she still had BLE edema, none at this time.  - c/w torsemide 20mg PO qd.      Patient was discharged to: (home/[facility]/acute rehab/hospice, etc, and with what services – home health PT/RN? Home O2?)        Items to follow up as outpatient: please follow up with orthopedics and you cardiologist   Items to follow up at [facility]: Cr at time of discharge was *** Please check BMP in 3 days and again at one week.     Physical exam at the time of discharge:  GENERAL: NAD, lying in bed comfortably  NECK: Supple, trachea midline, no JVD  HEART: Regular rate and rhythm, no murmurs, rubs, or gallops  LUNGS: Unlabored respirations.  Clear to auscultation bilaterally, no crackles, wheezing, or rhonchi  ABDOMEN: Soft, nontender, nondistended, +BS  EXTREMITIES: 2+ peripheral pulses bilaterally. No clubbing, cyanosis, or edema  NERVOUS SYSTEM:  A&Ox3, moving all extremities, no focal deficits   SKIN: No rashes or lesions #Discharge: do not delete    89F w/ PMH HTN, GIB, paroxysmal afib (on eliquis), HFpEF, severe MR s/p transcatheter mitral valve repair using Yudith on 07/3/19 ([employer] IID research trial), severe TR, LBP/OA, gingivitis, BIBEMS for mechanical fall that occurred after she bumped into another tenant in her building, found to have nondisplaced L greater trochanter proximal femur, admitted for ortho/PT eval and possible [facility] placement. Patient refusing MRI, therefore ortho unable to assess need for surgical intervention.    # Femur fracture, left   Pt presenting for mechanical fall after she bumped into another tenant in her building. CT head: no acute intracranial hemorrhage or fracture; superior ophthalmic veins (R>L) increased in size since 2/1/23 (nonspecific), no hyperdense regions to suggest thrombosis. 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 CT L femur: nondisplaced fracture greater trochanter proximal L femur. Seen by ortho in ED. S/p 1L NS in ED.  Ortho recommending MRI L hip to r/o intertrochanteric extension, however patient continually refusing   - pain control - standing Tylenol 975mg PO q6h    #HTN (hypertension)   Hx of HTN. Home meds: torsemide 20mg qd. BP 130s-150s/70s-90s since admission.  - c/w torsemide 20mg PO qd.  - f/u cardiologist     #Atrial fibrillation.   ·  Plan: Hx of paroxysmal afib. Home meds: eliquis 2.5mg BID, toprol 25mg qd. Follows w/ Dr. Ruiz. EKG on admission HR 75, afib with incomplete RBBB, QTc 469 however there is motion artifact.  - c/w eliquis 2.5mg PO BID   - c/w toprol 25mg PO qd.    # GI bleed.   Pt reports hx of "massive GI bleed" ~3 years ago at [place], per pt she had EGD vs colonoscopy but was not given a diagnosis/source of the bleeding. No recent prescriptions in [facility] for protonix, however pt was on it during her last admission in 2019.  - avoid NSAIDs.    #Chronic diastolic HF (heart failure).  TTE 06/2023 EF 55-60%, mildly dilated LV, mildly dilated RV, mildly reduced RV systolic function, severe biatrial enlargement, severe MR, severe TR, pulmonary HTN, two YUDITH devices in stable position across A2-P2 (unchanged singe 06/2023). Pt underwent percutaneous transcatheter repair of mitral valve using multiple leaflet clips in 07/2019 w/ Dr. Rojas. Home meds: torsemide 20mg qd. Follows w/ Dr. Ruiz and Dr. Rojas. Torsemide was recently increased from 10mg to 20mg and ASA was recently discontinued - both managed by Dr. Ruiz, who was also considering starting spironolactone 12.5mg on next visit. Unclear if pt was compliant w/ torsemide daily as she still had BLE edema, none at this time.  - torsemide decreased from 20mg to 10 PO qd      Patient was discharged to: [facility]    Items to follow up as outpatient: please follow up with orthopedics and you cardiologist   Items to follow up at [facility]: Cr at time of discharge was *** Please check BMP in 3 days and again at one week.     Physical exam at the time of discharge:  GENERAL: NAD, lying in bed comfortably  NECK: Supple, trachea midline, no JVD  HEART: Regular rate and rhythm, no murmurs, rubs, or gallops  LUNGS: Unlabored respirations.  Clear to auscultation bilaterally, no crackles, wheezing, or rhonchi  ABDOMEN: Soft, nontender, nondistended, +BS  EXTREMITIES: 2+ peripheral pulses bilaterally. No clubbing, cyanosis, or edema  NERVOUS SYSTEM:  A&Ox3, moving all extremities, no focal deficits   SKIN: No rashes or lesions #Discharge: do not delete    89F w/ PMH HTN, GIB, paroxysmal afib (on eliquis), HFpEF, severe MR s/p transcatheter mitral valve repair using Yudith on 07/3/19 ([employer] IID research trial), severe TR, LBP/OA, gingivitis, BIBEMS for mechanical fall that occurred after she bumped into another tenant in her building, found to have nondisplaced L greater trochanter proximal femur, admitted for ortho/PT eval and possible [facility] placement. Patient refusing MRI, therefore ortho unable to assess need for surgical intervention.    # Femur fracture, left   Pt presenting for mechanical fall after she bumped into another tenant in her building. CT head: no acute intracranial hemorrhage or fracture; superior ophthalmic veins (R>L) increased in size since 2/1/23 (nonspecific), no hyperdense regions to suggest thrombosis. 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 CT L femur: nondisplaced fracture greater trochanter proximal L femur. Seen by ortho in ED. S/p 1L NS in ED.  Ortho recommending MRI L hip to r/o intertrochanteric extension, however patient continually refusing   - pain control - standing Tylenol 975mg PO q6h    #HTN (hypertension)   Hx of HTN. Home meds: torsemide 20mg qd. BP 130s-150s/70s-90s since admission.  - c/w torsemide 20mg PO qd.  - f/u cardiologist     #Atrial fibrillation.   ·  Plan: Hx of paroxysmal afib. Home meds: eliquis 2.5mg BID, toprol 25mg qd. Follows w/ Dr. Ruiz. EKG on admission HR 75, afib with incomplete RBBB, QTc 469 however there is motion artifact.  - c/w eliquis 2.5mg PO BID   - c/w toprol 25mg PO qd.    # GI bleed.   Pt reports hx of "massive GI bleed" ~3 years ago at [place], per pt she had EGD vs colonoscopy but was not given a diagnosis/source of the bleeding. No recent prescriptions in [facility] for protonix, however pt was on it during her last admission in 2019.  - avoid NSAIDs.    #Chronic diastolic HF (heart failure).  TTE 06/2023 EF 55-60%, mildly dilated LV, mildly dilated RV, mildly reduced RV systolic function, severe biatrial enlargement, severe MR, severe TR, pulmonary HTN, two YUDITH devices in stable position across A2-P2 (unchanged singe 06/2023). Pt underwent percutaneous transcatheter repair of mitral valve using multiple leaflet clips in 07/2019 w/ Dr. Rojas. Home meds: torsemide 20mg qd. Follows w/ Dr. Ruiz and Dr. Rojas. Torsemide was recently increased from 10mg to 20mg and ASA was recently discontinued - both managed by Dr. Ruiz, who was also considering starting spironolactone 12.5mg on next visit. Unclear if pt was compliant w/ torsemide daily as she still had BLE edema, none at this time.  - torsemide decreased from 20mg to 10 PO qd    Patient was discharged to: [facility]    Items to follow up as outpatient: please follow up with orthopedics and you cardiologist    #Discharge: do not delete    89F w/ PMH HTN, GIB, paroxysmal afib (on eliquis), HFpEF, severe MR s/p transcatheter mitral valve repair using Yudith on 07/3/19 ([employer] IID research trial), severe TR, LBP/OA, gingivitis, BIBEMS for mechanical fall that occurred after she bumped into another tenant in her building, found to have nondisplaced L greater trochanter proximal femur, admitted for ortho/PT eval and possible [facility] placement. Patient refusing MRI, therefore ortho unable to assess need for surgical intervention.    # Femur fracture, left   Pt presenting for mechanical fall after she bumped into another tenant in her building. CT head: no acute intracranial hemorrhage or fracture; superior ophthalmic veins (R>L) increased in size since 2/1/23 (nonspecific), no hyperdense regions to suggest thrombosis. 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 CT L femur: nondisplaced fracture greater trochanter proximal L femur. Seen by ortho in ED. S/p 1L NS in ED.  Ortho recommending MRI L hip to r/o intertrochanteric extension  S/p Open reduction and internal fixation of left hip using locking intramedullary mando with ortho  - pain control - standing Tylenol 975mg PO q6h    #HTN (hypertension)   Hx of HTN. Home meds: torsemide 20mg qd. BP 130s-150s/70s-90s since admission.  - c/w torsemide 10mg PO qd.  - f/u cardiologist     #Atrial fibrillation.   Hx of paroxysmal afib. Home meds: eliquis 2.5mg BID, toprol 25mg qd. Follows w/ Dr. Ruiz. EKG on admission HR 75, afib with incomplete RBBB, QTc 469 however there is motion artifact.  - c/w eliquis 2.5mg PO BID   - c/w toprol 25mg PO qd.    # GI bleed.   Pt reports hx of "massive GI bleed" ~3 years ago at [place], per pt she had EGD vs colonoscopy but was not given a diagnosis/source of the bleeding. [facility] however pt was on it during her last admission in 2019.  - avoid NSAIDs.    #Chronic diastolic HF (heart failure).  TTE 06/2023 EF 55-60%, mildly dilated LV, mildly dilated RV, mildly reduced RV systolic function, severe biatrial enlargement, severe MR, severe TR, pulmonary HTN, two YUDITH devices in stable position across A2-P2 (unchanged singe 06/2023). Pt underwent percutaneous transcatheter repair of mitral valve using multiple leaflet clips in 07/2019 w/ Dr. Rojas. Home meds: torsemide 20mg qd. Follows w/ Dr. Ruiz and Dr. Rojas. Torsemide was recently increased from 10mg to 20mg and ASA was recently discontinued - both managed by Dr. Ruiz, who was also considering starting spironolactone 12.5mg on next visit. Unclear if pt was compliant w/ torsemide daily as she still had BLE edema, none at this time.  - torsemide decreased from 20mg to 10 PO qd    Patient was discharged to: [facility]    Items to follow up as outpatient: please follow up with orthopedics and you cardiologist   GENERAL: NAD, lying in bed comfortably, hoarse voice   NECK: Supple, trachea midline, no JVD  HEART: Regular rate and rhythm, no murmurs, rubs, or gallops  LUNGS: Unlabored respirations.  Clear to auscultation bilaterally, no crackles, wheezing, or rhonchi  ABDOMEN: Soft, nontender, nondistended, +BS  EXTREMITIES: 2+ peripheral pulses bilaterally. No clubbing, cyanosis, or edema. LLE pain with movement   NERVOUS SYSTEM:  A&Ox1, moving all extremities, no focal deficits   SKIN: No rashes or lesions     #Discharge: do not delete    89F w/ PMH HTN, GIB, paroxysmal afib (on eliquis), HFpEF, severe MR s/p transcatheter mitral valve repair using Yudith on 07/3/19 ([employer] IID research trial), severe TR, LBP/OA, gingivitis, BIBEMS for mechanical fall that occurred after she bumped into another tenant in her building, found to have nondisplaced L greater trochanter proximal femur, admitted for ortho/PT eval and possible [facility] placement. Patient refusing MRI, therefore ortho unable to assess need for surgical intervention.    # Femur fracture, left   Pt presenting for mechanical fall after she bumped into another tenant in her building. CT head: no acute intracranial hemorrhage or fracture; superior ophthalmic veins (R>L) increased in size since 2/1/23 (nonspecific), no hyperdense regions to suggest thrombosis. 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 CT L femur: nondisplaced fracture greater trochanter proximal L femur. Seen by ortho in ED. S/p 1L NS in ED.  Ortho recommending MRI L hip to r/o intertrochanteric extension  S/p Open reduction and internal fixation of left hip using locking intramedullary mando with ortho  - pain control - standing Tylenol 975mg PO q6h    #HTN (hypertension)   Hx of HTN. Home meds: torsemide 20mg qd. BP 130s-150s/70s-90s since admission.  - c/w torsemide 10mg PO qd.  - f/u cardiologist     #Atrial fibrillation.   Hx of paroxysmal afib. Home meds: eliquis 2.5mg BID, toprol 25mg qd. Follows w/ Dr. Ruiz. EKG on admission HR 75, afib with incomplete RBBB, QTc 469 however there is motion artifact.  - c/w eliquis 2.5mg PO BID   - c/w toprol 25mg PO qd.    # GI bleed.   Pt reports hx of "massive GI bleed" ~3 years ago at [place], per pt she had EGD vs colonoscopy but was not given a diagnosis/source of the bleeding. [facility], however pt was on it during her last admission in 2019.  - avoid NSAIDs.    #Chronic diastolic HF (heart failure).  TTE 06/2023 EF 55-60%, mildly dilated LV, mildly dilated RV, mildly reduced RV systolic function, severe biatrial enlargement, severe MR, severe TR, pulmonary HTN, two YUDITH devices in stable position across A2-P2 (unchanged singe 06/2023). Pt underwent percutaneous transcatheter repair of mitral valve using multiple leaflet clips in 07/2019 w/ Dr. Rojas. Home meds: torsemide 20mg qd. Follows w/ Dr. Ruiz and Dr. Rojas. Torsemide was recently increased from 10mg to 20mg and ASA was recently discontinued - both managed by Dr. Ruiz, who was also considering starting spironolactone 12.5mg on next visit. Unclear if pt was compliant w/ torsemide daily as she still had BLE edema, none at this time.  - torsemide decreased from 20mg to 10 PO qd      # sacral ulcer  - weight offload     Patient was discharged to: [facility]    Items to follow up as outpatient: please follow up with orthopedics and you cardiologist   GENERAL: NAD, lying in bed comfortably, hoarse voice   NECK: Supple, trachea midline, no JVD  HEART: Regular rate and rhythm, no murmurs, rubs, or gallops  LUNGS: Unlabored respirations.  Clear to auscultation bilaterally, no crackles, wheezing, or rhonchi  ABDOMEN: Soft, nontender, nondistended, +BS  EXTREMITIES: 2+ peripheral pulses bilaterally. No clubbing, cyanosis, or edema. LLE pain with movement   NERVOUS SYSTEM:  A&Ox1, moving all extremities, no focal deficits   SKIN: No rashes or lesions

## 2024-02-23 NOTE — PROGRESS NOTE ADULT - SUBJECTIVE AND OBJECTIVE BOX
24HR EVENTS:     SUBJECTIVE: Pt seen and examined on morning rounds. does not want MRI done of L hip   Denies CP, SOB, N/V, new onset motor/sensory deficits    Vital Signs Last 24 Hrs  T(C): 36.4 (22 Feb 2024 05:32), Max: 36.7 (21 Feb 2024 15:49)  T(F): 97.5 (22 Feb 2024 05:32), Max: 98 (21 Feb 2024 15:49)  HR: 82 (22 Feb 2024 05:32) (66 - 82)  BP: 131/91 (22 Feb 2024 05:32) (129/83 - 142/93)  BP(mean): --  RR: 18 (22 Feb 2024 05:32) (17 - 18)  SpO2: 97% (22 Feb 2024 05:32) (97% - 97%)    Parameters below as of 22 Feb 2024 05:32  Patient On (Oxygen Delivery Method): room air        Physical Exam:  General: NAD, resting comfortably in bed  Focused L hip examination  TTP @ L GT   pain with log roll   silt sural/saph/dpn/spn/tib   5/5 ehl/fhl/ta/gs     LABS/RADIOLOGY RESULTS:                          15.8   7.22  )-----------( 148      ( 23 Feb 2024 05:30 )             47.9   02-23    141  |  106  |  36<H>  ----------------------------<  95  4.3   |  23  |  1.42<H>    Ca    8.9      23 Feb 2024 05:30  Phos  4.2     02-23  Mg     2.0     02-23    TPro  5.9<L>  /  Alb  3.5  /  TBili  1.0  /  DBili  x   /  AST  47<H>  /  ALT  20  /  AlkPhos  103  02-23  Blood Cultures    Urinalysis Basic - ( 23 Feb 2024 05:30 )    Color:  / Appearance:  / SG:  / pH:   Gluc: 95 mg/dL / Ketone:   / Bili:  / Urobili:    Blood:  / Protein:  / Nitrite:    Leuk Esterase:  / RBC:  / WBC    Sq Epi:  / Non Sq Epi:  / Bacteria:             Assessment/Plan:  89yF w/ non displaced L GT Fx best appreciated on CT L hip. Ortho recommends MRI of L hip to r/o intertrochanteric extension. given that patient is refusing MRI of L hip at this time, will recommend TTWB LLE and outpt f/u     Truman Meier, PGY-3  Orthopedic Surgery

## 2024-02-23 NOTE — PROGRESS NOTE ADULT - PROBLEM SELECTOR PLAN 6
F: None   E: Replete as necessary K>4 Mg>2  N: soft and bite sized DASH diet when she passes dysphagia screen  DVT Prophylaxis: eliquis  GI prophylaxis: None   CODE STATUS: FULL CODE  Dispo: [facility]

## 2024-02-24 NOTE — PROGRESS NOTE ADULT - PROBLEM SELECTOR PLAN 1
Pt presenting for mechanical fall after she bumped into another tenant in her building. CT head: no acute intracranial hemorrhage or fracture; superior ophthalmic veins (R>L) increased in size since 2/1/23 (nonspecific), no hyperdense regions to suggest thrombosis. 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 CT L femur: nondisplaced fracture greater trochanter proximal L femur. Seen by ortho in ED. S/p 1L NS in ED.  - ortho recommending MRI L hip to r/o intertrochanteric extension  - NWB LLE until MRI- pt now amenable to   - PT recs  - pain control - tylenol 650mg PO q6h PRN, add PRN oxy 2.5 if needed -- avoid NSAIDs given h/o GIB

## 2024-02-24 NOTE — PROGRESS NOTE ADULT - PROBLEM SELECTOR PLAN 3
Hx of paroxysmal afib. Home meds: eliquis 2.5mg BID, toprol 25mg qd. Follows w/ Dr. Ruiz. EKG on admission HR 75, afib with incomplete RBBB, QTc 469 however there is motion artifact.  -  c/w toprol 25mg PO qd  - holding eliquis (possible surgery depending on MRI results )  per BRIDGE trial, would not bridge , especially given hx of 'massive GI Bleed' as recently as 3 years ago

## 2024-02-24 NOTE — PROGRESS NOTE ADULT - PROBLEM SELECTOR PLAN 5
TTE 06/2023 EF 55-60%, mildly dilated LV, mildly dilated RV, mildly reduced RV systolic function, severe biatrial enlargement, severe MR, severe TR, pulmonary HTN, two YUDITH devices in stable position across A2-P2 (unchanged singe 06/2023). Pt underwent percutaneous transcatheter repair of mitral valve using multiple leaflet clips in 07/2019 w/ Dr. Rojas. Home meds: torsemide 20mg qd. Follows w/ Dr. Ruiz and Dr. Rojas. Torsemide was recently increased from 10mg to 20mg and ASA was recently discontinued - both managed by Dr. Ruiz, who was also considering starting spironolactone 12.5mg on next visit. Unclear if pt was compliant w/ torsemide daily as she still had BLE edema, none at this time.    [  ] resume torsemide at 10mg qd (lower dose) when YASH resolves

## 2024-02-24 NOTE — PROGRESS NOTE ADULT - ASSESSMENT
89F w/ PMH HTN, GIB, paroxysmal afib (on eliquis), HFpEF, severe MR s/p transcatheter mitral valve repair using Saulo on 07/3/19 (Clasp IID research trial), severe TR, LBP/OA, gingivitis, BIBEMS for mechanical fall that occurred after she bumped into another tenant in her building, found to have nondisplaced L greater trochanter proximal femur, admitted for ortho/PT eval and possible BRISA placement. Pending MRI to determine extent of fracture.

## 2024-02-24 NOTE — PROGRESS NOTE ADULT - PROBLEM SELECTOR PLAN 6
F: None   E: Replete as necessary K>4 Mg>2  N: soft and bite sized DASH diet when she passes dysphagia screen  DVT Prophylaxis: eliquis  GI prophylaxis: None   CODE STATUS: FULL CODE  Dispo: [facility]

## 2024-02-24 NOTE — PROGRESS NOTE ADULT - SUBJECTIVE AND OBJECTIVE BOX
Patient is a 89y old  Female who presents with a chief complaint of Femur fracture  (23 Feb 2024 10:47)    INTERVAL EVENTS:  now amenable to MRI , surgery if indicated , and BRISA after discussion with niece    SUBJECTIVE:  Patient was seen and examined at bedside.    Review of systems: No CP, dyspnea, nausea or vomiting, dysuria, LE edema.     MEDICATIONS:  MEDICATIONS  (STANDING):  acetaminophen     Tablet .. 975 milliGRAM(s) Oral every 6 hours  gabapentin Solution 100 milliGRAM(s) Oral three times a day  heparin   Injectable 5000 Unit(s) SubCutaneous every 8 hours  latanoprost 0.005% Ophthalmic Solution 1 Drop(s) Both EYES at bedtime  lidocaine   4% Patch 1 Patch Transdermal daily  metoprolol succinate ER 25 milliGRAM(s) Oral every 24 hours  polyethylene glycol 3350 17 Gram(s) Oral every 12 hours  senna 2 Tablet(s) Oral at bedtime    MEDICATIONS  (PRN):  oxyCODONE    IR 2.5 milliGRAM(s) Oral every 6 hours PRN Severe Pain (7 - 10)    Allergies    penicillin (Hives; Blisters)    Intolerances        OBJECTIVE:  Vital Signs Last 24 Hrs  T(C): 36.7 (24 Feb 2024 20:38), Max: 36.7 (24 Feb 2024 05:30)  T(F): 98 (24 Feb 2024 20:38), Max: 98 (24 Feb 2024 05:30)  HR: 82 (24 Feb 2024 20:38) (73 - 83)  BP: 119/89 (24 Feb 2024 20:38) (110/74 - 127/92)  BP(mean): --  RR: 18 (24 Feb 2024 20:38) (17 - 18)  SpO2: 97% (24 Feb 2024 20:38) (96% - 97%)    Parameters below as of 24 Feb 2024 20:38  Patient On (Oxygen Delivery Method): room air      I&O's Summary    23 Feb 2024 07:01  -  24 Feb 2024 07:00  --------------------------------------------------------  IN: 0 mL / OUT: 200 mL / NET: -200 mL    24 Feb 2024 07:01  -  24 Feb 2024 22:52  --------------------------------------------------------  IN: 500 mL / OUT: 650 mL / NET: -150 mL    PHYSICAL EXAM:  Gen: Reclining in bed at time of exam, appears stated age  HEENT: NCAT, MMM, clear OP  Neck: supple, trachea at midline  CV: RRR, +S1/S2  Pulm: adequate respiratory effort, no increase in work of breathing  Abd: soft, ND  Skin: warm and dry,   Neuro: AOx3, speaking in full sentences  Psych: affect and behavior appropriate, pleasant at time of interview    LABS:                        15.8   7.22  )-----------( 148      ( 23 Feb 2024 05:30 )             47.9     02-24    138  |  102  |  38<H>  ----------------------------<  100<H>  3.8   |  23  |  1.33<H>    Ca    9.1      24 Feb 2024 14:55  Phos  4.2     02-23  Mg     2.0     02-23    TPro  5.9<L>  /  Alb  3.5  /  TBili  1.0  /  DBili  x   /  AST  47<H>  /  ALT  20  /  AlkPhos  103  02-23    LIVER FUNCTIONS - ( 23 Feb 2024 05:30 )  Alb: 3.5 g/dL / Pro: 5.9 g/dL / ALK PHOS: 103 U/L / ALT: 20 U/L / AST: 47 U/L / GGT: x             CAPILLARY BLOOD GLUCOSE        Urinalysis Basic - ( 24 Feb 2024 14:55 )    Color: x / Appearance: x / SG: x / pH: x  Gluc: 100 mg/dL / Ketone: x  / Bili: x / Urobili: x   Blood: x / Protein: x / Nitrite: x   Leuk Esterase: x / RBC: x / WBC x   Sq Epi: x / Non Sq Epi: x / Bacteria: x        MICRODATA:      RADIOLOGY/OTHER STUDIES:

## 2024-02-25 NOTE — PROGRESS NOTE ADULT - PROBLEM SELECTOR PLAN 1
Pt presenting for mechanical fall after she bumped into another tenant in her building. CT head: no acute intracranial hemorrhage or fracture; superior ophthalmic veins (R>L) increased in size since 2/1/23 (nonspecific), no hyperdense regions to suggest thrombosis. 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 CT L femur: nondisplaced fracture greater trochanter proximal L femur. Seen by ortho in ED. S/p 1L NS in ED.  - ortho recommending MRI L hip to r/o intertrochanteric extension  - NWB LLE until MRI- pt now pending MRI after discussion with niece  - PT recs  - pain control - tylenol 650mg PO q6h PRN, add PRN oxy 2.5 if needed -- avoid NSAIDs given h/o GIB

## 2024-02-25 NOTE — PROGRESS NOTE ADULT - PROBLEM SELECTOR PLAN 5
TTE 06/2023 EF 55-60%, mildly dilated LV, mildly dilated RV, mildly reduced RV systolic function, severe biatrial enlargement, severe MR, severe TR, pulmonary HTN, two YUDITH devices in stable position across A2-P2 (unchanged singe 06/2023). Pt underwent percutaneous transcatheter repair of mitral valve using multiple leaflet clips in 07/2019 w/ Dr. Rojas. Home meds: torsemide 20mg qd. Follows w/ Dr. Ruiz and Dr. Rojas. Torsemide was recently increased from 10mg to 20mg and ASA was recently discontinued - both managed by Dr. Ruiz, who was also considering starting spironolactone 12.5mg on next visit. Unclear if pt was compliant w/ torsemide daily as she still had BLE edema, none at this time.  - c/w torsemide 10mg PO qd TTE 06/2023 EF 55-60%, mildly dilated LV, mildly dilated RV, mildly reduced RV systolic function, severe biatrial enlargement, severe MR, severe TR, pulmonary HTN, two YUDITH devices in stable position across A2-P2 (unchanged singe 06/2023). Pt underwent percutaneous transcatheter repair of mitral valve using multiple leaflet clips in 07/2019 w/ Dr. Rojas. Home meds: torsemide 20mg qd. Follows w/ Dr. Ruiz and Dr. Rojas. Torsemide was recently increased from 10mg to 20mg and ASA was recently discontinued - both managed by Dr. Ruiz, who was also considering starting spironolactone 12.5mg on next visit. Unclear if pt was compliant w/ torsemide daily as she still had BLE edema, none at this time.  - c/w torsemide 10mg PO qd      ProBNP Trend from [facility]    2.24.24 (This admission) --- 4699  9.22.23                          --- 970  5.16.23                          --- 793  4.18.23                          --- 752    Cardiology for pre-op eval to help with volume optimization if planning for surgery   12.22.22                        --- [phone]

## 2024-02-25 NOTE — PROGRESS NOTE ADULT - PROBLEM SELECTOR PLAN 3
Hx of paroxysmal afib. Home meds: eliquis 2.5mg BID, toprol 25mg qd. Follows w/ Dr. Ruiz. EKG on admission HR 75, afib with incomplete RBBB, QTc 469 however there is motion artifact.  -  restart eliquis 2.5mg PO   - c/w toprol 25mg PO qd

## 2024-02-25 NOTE — CONSULT NOTE ADULT - ATTENDING COMMENTS
Patient is a 89 yo Female with Pmhx of MVP with Flail posterior leaflet ( P2 and P3 scallops) and Severe MR, s/p transcatheter MV repair with Montano Yudith Clasp x 2 in 07/2019, Severe TR, pAfib on Eliquis, HFpEF, HTN, GIB, BIBEMS after sustaining mechanical fall, found to have  acute nondisplaced intertrochanteric fracture at the left proximal femur with plan for surgical correction. Cardiology consulted for perioperative Cardiovascular risk assessment for planned left femur IMN on 2/26/24.    Review of Studies:  - ECG 2/20/24: Afib w/IRBBB and non-specific ST&T wave changes  - ECG 2/25/24: Afib w/RBBB, PVCs, motion artifact   - TTE 6/23': Mildly dilated left ventricular size, Normal left ventricular systolic function. Mildly dilated right ventricular size. Mildly reduced right ventricular systolic fx, Severe RONI, Severe MR, Severe TR, Pulmonary hypertension present, Two YUDITH devices are in stable position across A2-P2    Outpatient Cardiologists: Dr. Ruiz, Dr. Rojas (structural)    Home Meds: Eliquis 2.5mg BID, Torsemide 20mg qd, Toprol 25mg XL qd    #Perioperative Cardiovascular Risk Assessment  - Patient with known HFpEF, mildly Reduced RV function and severe Valvular heart disease, planned for Left Femur IMN  - Clinically she is warm, well compensated and well perfused with JVP to the clavicle, no pulmonary or lower extremity edema  - Last Echo performed in June of last year revealed Presence of Yudith Clasps x2 with residual severe MR as well as severe TR  - Patient reports good performance status. She is able to go to the grocery store and museums unassisted.   - At this time there are no Further CV testing required for non elective procedure. Patient not in ACS, decompensated HF or with any arrythmia  - She is Deemed at intermediate to high risk of perioperative cardiovascular complications for an intermediate risk procedure. Given multivalvular heart disease would Recommend support with cardiac anesthesia   - C/w Toprol 25mg XL qd perioperatively  - C/w Torsemide 20 mg po daily to maintain euvolemia  - Please be judicious with fluid resuscitation given severe valvular disease    # Persistent Afib  - Patient with kown Persistent Afib currently rate controlled   - [id number] of at least  5 placing patient at high thromboembolic risk  -C/w Toprol 25mg XL qd perioperatively w/holding parameters SBP <110 and/or HR <60  -Given high CHADVASC score would recommend Heparin Gtt perioperatively with goal to DC patient on Eliquis 2.5 mg po BID (55 Kilos and 89 yo)    Please ensure patient has f/u with Dr. Ruiz and Crystal within 2-4 weeks of d/c  Cardiology will cont to follow with you, please call with any questions

## 2024-02-25 NOTE — CONSULT NOTE ADULT - ASSESSMENT
XXXXX    Review of Studies:    Recommendations:    #Perioperative Cardiovascular Risk Assessment  -RCRI ?, [place] ? risk for MI or cardiac arrest during the procedure and up to 30d  -No evidence of ACS, decompensated HF, severe AS, and tachyarrhythmia on clinical and physical exam assessment  -Had ? METs of exercise tolerance prior to the hospitalization  -? cardiac w/u required, pt is deemed cardiovascularly ? for the procedure and may/may not proceed  -Deemed ? risk of perioperative cardiovascular complications for an ? risk procedure      #XX  -  -  -      Recommendations above are preliminary pending discussion with an attending cardiologist  Plan was discussed with primary team  We'll continue to follow, thank you for the consultation      Dilan Guzman ([id number])  Cardiovascular Disease Fellow  Consult Pager: [phone]         89F pmhx HTN, GIB, pAfib on Eliquis, HFpeF, s/p Yudith 7/3/2019 w/severe residual MR and Severe TR, known IRBBB, OA, and polyneuropathy was BIBEMS after mechanical fall and was found to have an acute nondisplaced intertrochanteric fracture at the left proximal femur on MRI. Cardiology consulted for perioperative risk assessment for planned left femur IMN on 2/26/24:    Review of Studies:  ECG 2/20/24: Afib w/IRBBB and non-specific ST&T wave changes  TTE 6/23: Mildly dilated left ventricular size, Normal left ventricular systolic function.Mildly dilated right ventricular size.  Mildly reduced right ventricular systolic fx, Severe RONI, Severe MR, Severe TR, Pulmonary hypertension present,  Two YUDITH devices are in stable position across A2-P2.      Outpatient Cardiologist: Dr. Ruiz    Home Meds: Eliquis 2.5mg, Torsemide 20mg qd, Toprol 25mg XL qd      Recommendations:    #Perioperative Cardiovascular Risk Assessment  -RCRI ?, [place] ? risk for MI or cardiac arrest during the procedure and up to 30d  -No evidence of ACS, decompensated HF, severe AS, and tachyarrhythmia on clinical and physical exam assessment  -Had ? METs of exercise tolerance prior to the hospitalization  -? cardiac w/u required, pt is deemed cardiovascularly ? for the procedure and may/may not proceed  -Deemed ? risk of perioperative cardiovascular complications for an ? risk procedure    #Severe TR  #Severe MR s/p Yudith Mitral Clip  #HFpEF (ACC Stage B, NYHA Class 2)  Etiology: likely 2/2 structural disease, age, HTN  Hemodynamics: HD sound  Perfusion: warm and well perfused  Inopressors: n/a  GDMT:   Diuretics: avoid aggressive hydration via IVF given HFpEF, euvolemic on exam, strict I/Os and daily weights  Device: n/a  AT: n/a        Recommendations above are preliminary pending discussion with an attending cardiologist  Plan was discussed with primary team  We'll continue to follow, thank you for the consultation      Dilan Guzman ([id number])  Cardiovascular Disease Fellow  Consult Pager: [phone]         89F pmhx HTN, GIB, pAfib on Eliquis (reduced dosage), HFpEF, s/p Yudith 7/3/2019 w/severe residual MR and Severe TR, known IRBBB, OA, and polyneuropathy was BIBEMS after mechanical fall and was found to have an acute nondisplaced intertrochanteric fracture at the left proximal femur on MRI. Cardiology consulted for perioperative risk assessment for planned left femur IMN on 2/26/24.    Review of Studies:  ECG 2/20/24: Afib w/IRBBB and non-specific ST&T wave changes  ECG 2/25/24: Afib w/RBBB, PVCs, motion artifact   TTE 6/23': Mildly dilated left ventricular size, Normal left ventricular systolic function. Mildly dilated right ventricular size.  Mildly reduced right ventricular systolic fx, Severe RONI, Severe MR, Severe TR, Pulmonary hypertension present,  Two YUDITH devices are in stable position across A2-P2    Outpatient Cardiologists: Dr. Ruiz, Dr. Rojas (structural)    Home Meds: Eliquis 2.5mg, Torsemide 20mg qd, Toprol 25mg XL qd      Recommendations:    #Perioperative Cardiovascular Risk Assessment  -RCRI 1, [place] 0.3% risk for MI or cardiac arrest during the procedure and up to 30d  -No evidence of ACS, decompensated HF, severe AS, and tachyarrhythmia on clinical and physical exam assessment  -Had 4> METs of exercise tolerance prior to the hospitalization/fall  -No further cardiac w/u required, pt is deemed cardiovascularly optimized for the procedure and may proceed  -Deemed intermediate risk of perioperative cardiovascular complications for an intermediate risk procedure  -C/w Toprol 25mg XL qd perioperatively    #Severe TR  #Severe MR s/p Yudith Mitral Clip  #HFpEF (ACC Stage B, NYHA Class 2)  Etiology: likely 2/2 structural disease, age, HTN  Hemodynamics: HD sound  Perfusion: warm and well perfused  Inopressors: n/a  GDMT: SGLT2i upon d/c  Diuretics: avoid aggressive hydration via IVF given HFpEF, euvolemic on exam, strict I/Os and daily weights  Device: n/a  AT: n/a  Please ensure patient has f/u with Dr. Rojas within 2-4 weeks of d/c      #pAfib  -Etiology likely 2/2 HFpEF and severe MR  -[id number]: 5  -Euvoelmic on exam,   -Please obtain a TSH w/reflex FT4  -Currently rate controlled w AVN blocking agent  -C/w Toprol 25mg XL qd perioperatively w/holding parameters SBP <110 and/or HR <60  -Can resume full AC (Eliquis 2.5mg BID or heparin gtt) within 24h given elevated CHADSVASC score  -Please have the pt f/u with a cardiologist within 2-4 weeks post DC    #HTN  -currently normotensive  -c/w torsemide 10mg qd      Recommendations above are preliminary pending discussion with an attending cardiologist  Plan was discussed with primary team  We'll continue to follow, thank you for the consultation      Dilan Guzman ([id number])  Cardiovascular Disease Fellow  Consult Pager: [phone]         89F pmhx HTN, GIB, pAfib on Eliquis (reduced dosage), HFpEF, s/p Yudith 7/3/2019 w/severe residual MR and Severe TR, known IRBBB, OA, and polyneuropathy was BIBEMS after mechanical fall and was found to have an acute nondisplaced intertrochanteric fracture at the left proximal femur on MRI. Cardiology consulted for perioperative risk assessment for planned left femur IMN on 2/26/24.    Review of Studies:  ECG 2/20/24: Afib w/IRBBB and non-specific ST&T wave changes  ECG 2/25/24: Afib w/RBBB, PVCs, motion artifact   TTE 6/23': Mildly dilated left ventricular size, Normal left ventricular systolic function. Mildly dilated right ventricular size.  Mildly reduced right ventricular systolic fx, Severe RONI, Severe MR, Severe TR, Pulmonary hypertension present,  Two YUDITH devices are in stable position across A2-P2    Outpatient Cardiologists: Dr. Ruiz, Dr. Rojas (structural)    Home Meds: Eliquis 2.5mg, Torsemide 20mg qd, Toprol 25mg XL qd      Recommendations:    #Perioperative Cardiovascular Risk Assessment  -RCRI 1, [place] 0.3% risk for MI or cardiac arrest during the procedure and up to 30d  -No evidence of ACS, decompensated HF, severe AS, and tachyarrhythmia on clinical and physical exam assessment  -Had 4> METs of exercise tolerance prior to the hospitalization/fall  -No further cardiac w/u required, pt is deemed cardiovascularly optimized for the procedure and may proceed  -Deemed intermediate risk of perioperative cardiovascular complications for an intermediate risk procedure  -C/w Toprol 25mg XL qd perioperatively    #Severe TR  #Severe MR s/p Yudith Mitral Clip  #HFpEF (ACC Stage B, NYHA Class 2)  Etiology: likely 2/2 structural disease, age, HTN  Hemodynamics: HD sound  Perfusion: warm and well perfused  Inopressors: n/a  GDMT: SGLT2i upon d/c  Diuretics: avoid aggressive hydration via IVF given HFpEF, euvolemic on exam, strict I/Os and daily weights  Device: n/a  AT: n/a  Please ensure patient has f/u with Dr. Rojas within 2-4 weeks of d/c      #pAfib  -Etiology likely 2/2 HFpEF and severe MR  -[id number]: 5  -Euvoelmic on exam,   -Please obtain a TSH w/reflex FT4  -Currently rate controlled w AVN blocking agent  -C/w Toprol 25mg XL qd perioperatively w/holding parameters SBP <110 and/or HR <60  -Can resume full AC (Eliquis 2.5mg BID or heparin gtt) within 24h post op given elevated CHADSVASC score  -Please have the pt f/u with a cardiologist within 2-4 weeks post DC    #HTN  -currently normotensive  -c/w torsemide 10mg qd      Recommendations above are preliminary pending discussion with an attending cardiologist  Plan was discussed with primary team  We'll continue to follow, thank you for the consultation      Dilan Guzman ([id number])  Cardiovascular Disease Fellow  Consult Pager: [phone]         89F pmhx HTN, GIB, pAfib on Eliquis (reduced dosage), HFpEF, s/p Yudith 7/3/2019 w/severe residual MR and Severe TR, known IRBBB, OA, and polyneuropathy was BIBEMS after mechanical fall and was found to have an acute nondisplaced intertrochanteric fracture at the left proximal femur on MRI. Cardiology consulted for perioperative risk assessment for planned left femur IMN on 2/26/24.    Review of Studies:  ECG 2/20/24: Afib w/IRBBB and non-specific ST&T wave changes  ECG 2/25/24: Afib w/RBBB, PVCs, motion artifact   TTE 6/23': Mildly dilated left ventricular size, Normal left ventricular systolic function. Mildly dilated right ventricular size.  Mildly reduced right ventricular systolic fx, Severe RONI, Severe MR, Severe TR, Pulmonary hypertension present,  Two YUDITH devices are in stable position across A2-P2    Outpatient Cardiologists: Dr. Ruiz, Dr. Rojas (structural)    Home Meds: Eliquis 2.5mg BID, Torsemide 20mg qd, Toprol 25mg XL qd      Recommendations:    #Perioperative Cardiovascular Risk Assessment  -RCRI 1, [place] 0.3% risk for MI or cardiac arrest during the procedure and up to 30d  -No evidence of ACS, decompensated HF, severe AS, and tachyarrhythmia on clinical and physical exam assessment  -Had 4> METs of exercise tolerance prior to the hospitalization/fall  -No further cardiac w/u required, pt is deemed cardiovascularly optimized for the procedure and may proceed  -Deemed intermediate risk of perioperative cardiovascular complications for an intermediate risk procedure  -C/w Toprol 25mg XL qd perioperatively    #Severe TR  #Severe MR s/p Yudith Mitral Clip  #HFpEF (ACC Stage B, NYHA Class 2)  Etiology: likely 2/2 structural disease, age, HTN  Hemodynamics: HD sound  Perfusion: warm and well perfused  Inopressors: n/a  GDMT: SGLT2i upon d/c  Diuretics: avoid aggressive hydration via IVF given HFpEF, euvolemic on exam, strict I/Os and daily weights  Device: n/a  AT: n/a  Please ensure patient has f/u with Dr. Rojas within 2-4 weeks of d/c      #pAfib  -Etiology likely 2/2 HFpEF and severe MR  -[id number]: 5  -Euvoelmic on exam,   -Please obtain a TSH w/reflex FT4  -Currently rate controlled w AVN blocking agent  -C/w Toprol 25mg XL qd perioperatively w/holding parameters SBP <110 and/or HR <60  -Can resume full AC (Eliquis 2.5mg BID or heparin gtt) within 24h post op given elevated CHADSVASC score  -Please have the pt f/u with a cardiologist within 2-4 weeks post DC    #HTN  -currently normotensive  -c/w torsemide 10mg qd      Recommendations above are preliminary pending discussion with an attending cardiologist  Plan was discussed with primary team  We'll continue to follow, thank you for the consultation      Dilan Guzman ([id number])  Cardiovascular Disease Fellow  Consult Pager: [phone]         89F pmhx HTN, GIB, pAfib on Eliquis (reduced dosage), HFpEF, s/p Yudith 7/3/2019 w/severe residual MR and Severe TR, known IRBBB, OA, and polyneuropathy was BIBEMS after mechanical fall and was found to have an acute nondisplaced intertrochanteric fracture at the left proximal femur on MRI. Cardiology consulted for perioperative risk assessment for planned left femur IMN on 2/26/24.    Review of Studies:  ECG 2/20/24: Afib w/IRBBB and non-specific ST&T wave changes  ECG 2/25/24: Afib w/RBBB, PVCs, motion artifact   TTE 6/23': Mildly dilated left ventricular size, Normal left ventricular systolic function. Mildly dilated right ventricular size.  Mildly reduced right ventricular systolic fx, Severe RONI, Severe MR, Severe TR, Pulmonary hypertension present,  Two YUDITH devices are in stable position across A2-P2    Outpatient Cardiologists: Dr. Ruiz, Dr. Rojas (structural)    Home Meds: Eliquis 2.5mg BID, Torsemide 20mg qd, Toprol 25mg XL qd      Recommendations:    #Perioperative Cardiovascular Risk Assessment  -RCRI 1, [place] 0.3% risk for MI or cardiac arrest during the procedure and up to 30d  -No evidence of ACS, decompensated HF, severe AS, and tachyarrhythmia on clinical and physical exam assessment  -Had 4> METs of exercise tolerance prior to the hospitalization/fall  -No further cardiac w/u required, pt is deemed cardiovascularly optimized for the procedure and may proceed  -Deemed intermediate risk of perioperative cardiovascular complications for an intermediate risk procedure  -C/w Toprol 25mg XL qd perioperatively    #Severe TR  #Severe MR s/p Yudith Mitral Clip  #HFpEF (ACC Stage B, NYHA Class 2)  Etiology: likely 2/2 structural disease, age, HTN  Hemodynamics: HD sound  Perfusion: warm and well perfused  Inopressors: n/a  GDMT: SGLT2i upon d/c  Diuretics: euvolemic on exam, strict I/Os and daily weights, avoid aggressive hydration via IVF given HFpEF, c/w torsemide 10mg qd while NPO  Device: n/a  AT: n/a  Please ensure patient has f/u with Dr. Rojas within 2-4 weeks of d/c      #pAfib  -Etiology likely 2/2 HFpEF and severe MR  -[id number]: 5  Euvolemic on exam  -Please obtain a TSH w/reflex FT4  -Currently rate controlled w AVN blocking agent  -C/w Toprol 25mg XL qd perioperatively w/holding parameters SBP <110 and/or HR <60  -Can resume full AC (Eliquis 2.5mg BID or heparin gtt) within 24h post op given elevated CHADSVASC score  -Please have the pt f/u with a cardiologist within 2-4 weeks post DC    #HTN  -currently normotensive  -c/w torsemide 10mg qd      Recommendations above are preliminary pending discussion with an attending cardiologist  Plan was discussed with primary team  We'll continue to follow, thank you for the consultation      Dilan Guzman ([id number])  Cardiovascular Disease Fellow  Consult Pager: [phone]         89F pmhx HTN, GIB, pAfib on Eliquis (reduced dosage), HFpEF, s/p Yudith 7/3/2019 w/severe residual MR and Severe TR, known IRBBB, OA, and polyneuropathy was BIBEMS after mechanical fall and was found to have an acute nondisplaced intertrochanteric fracture at the left proximal femur on MRI. Cardiology consulted for perioperative risk assessment for planned left femur IMN on 2/26/24.    Review of Studies:  ECG 2/20/24: Afib w/IRBBB and non-specific ST&T wave changes  ECG 2/25/24: Afib w/RBBB, PVCs, motion artifact   TTE 6/23': Mildly dilated left ventricular size, Normal left ventricular systolic function. Mildly dilated right ventricular size.  Mildly reduced right ventricular systolic fx, Severe RONI, Severe MR, Severe TR, Pulmonary hypertension present,  Two YUDITH devices are in stable position across A2-P2    Outpatient Cardiologists: Dr. Ruiz, Dr. Rojas (structural)    Home Meds: Eliquis 2.5mg BID, Torsemide 20mg qd, Toprol 25mg XL qd      Recommendations:    #Perioperative Cardiovascular Risk Assessment  -RCRI 1, [place] 0.3% risk for MI or cardiac arrest during the procedure and up to 30d  -No evidence of ACS, decompensated HF, severe AS, and tachyarrhythmia on clinical and physical exam assessment  -Had 4> METs of exercise tolerance prior to the hospitalization/fall  -No further cardiac w/u required, pt is deemed cardiovascularly optimized for the procedure and may proceed  -Deemed intermediate risk of perioperative cardiovascular complications for an intermediate risk procedure  -C/w Toprol 25mg XL qd perioperatively    #Severe TR  #Severe MR s/p Yudith Mitral Clip  #HFpEF (ACC Stage B, NYHA Class 2)  Etiology: likely 2/2 valvular disease, age, HTN  Hemodynamics: HD sound  Perfusion: warm and well perfused  Inopressors: n/a  GDMT: SGLT2i upon d/c  Diuretics: euvolemic on exam, strict I/Os and daily weights, avoid aggressive hydration via IVF given HFpEF, c/w torsemide 10mg qd while NPO  Device: n/a  AT: n/a  Please ensure patient has f/u with Dr. Rojas within 2-4 weeks of d/c      #pAfib  -Etiology likely 2/2 HFpEF and severe MR  -[id number]: 5  Euvolemic on exam  -Please obtain a TSH w/reflex FT4  -Currently rate controlled w AVN blocking agent  -C/w Toprol 25mg XL qd perioperatively w/holding parameters SBP <110 and/or HR <60  -Can resume full AC (Eliquis 2.5mg BID or heparin gtt) within 24h post op given elevated CHADSVASC score  -Please have the pt f/u with a cardiologist within 2-4 weeks post DC    #HTN  -currently normotensive  -c/w torsemide 10mg qd      Recommendations above are preliminary pending discussion with an attending cardiologist  Plan was discussed with primary team  We'll continue to follow, thank you for the consultation      Dilan Guzman ([id number])  Cardiovascular Disease Fellow  Consult Pager: [phone]         89F pmhx HTN, GIB, pAfib on Eliquis (reduced dosage), HFpEF, s/p Yudith 7/3/2019 w/severe residual MR and Severe TR, known IRBBB, OA, and polyneuropathy was BIBEMS after mechanical fall and was found to have an acute nondisplaced intertrochanteric fracture at the left proximal femur on MRI. Cardiology consulted for perioperative risk assessment for planned left femur IMN on 2/26/24.    Review of Studies:  ECG 2/20/24: Afib w/IRBBB and non-specific ST&T wave changes  ECG 2/25/24: Afib w/RBBB, PVCs, motion artifact   TTE 6/23': Mildly dilated left ventricular size, Normal left ventricular systolic function. Mildly dilated right ventricular size.  Mildly reduced right ventricular systolic fx, Severe RONI, Severe MR, Severe TR, Pulmonary hypertension present,  Two YUDITH devices are in stable position across A2-P2    Outpatient Cardiologists: Dr. Ruiz, Dr. Rojas (structural)    Home Meds: Eliquis 2.5mg BID, Torsemide 20mg qd, Toprol 25mg XL qd      Recommendations:    #Perioperative Cardiovascular Risk Assessment  -RCRI 1, [place] 0.3% risk for MI or cardiac arrest during the procedure and up to 30d  -No evidence of ACS, decompensated HF, severe AS, and tachyarrhythmia on clinical and physical exam assessment  -Had 4> METs of exercise tolerance prior to the hospitalization/fall  -No further cardiac w/u required, pt is deemed cardiovascularly optimized for the procedure and may proceed  -Deemed intermediate to high risk of perioperative cardiovascular complications for an intermediate risk procedure  -C/w Toprol 25mg XL qd perioperatively  - Recommend cardiac anesthesia     #Severe TR  #Severe MR s/p Yudith Mitral Clip  #HFpEF (ACC Stage B, NYHA Class 2)  Etiology: likely 2/2 valvular disease, age, HTN  Hemodynamics: HD sound  Perfusion: warm and well perfused  Inopressors: n/a  GDMT: SGLT2i upon d/c  Diuretics: euvolemic on exam, strict I/Os and daily weights, avoid aggressive hydration via IVF given HFpEF, c/w torsemide 10mg qd while NPO  Device: n/a  AT: n/a  Please ensure patient has f/u with Dr. Rojas within 2-4 weeks of d/c      #pAfib  -Etiology likely 2/2 HFpEF and severe MR  -[id number]: 5  Euvolemic on exam  -Please obtain a TSH w/reflex FT4  -Currently rate controlled w AVN blocking agent  -C/w Toprol 25mg XL qd perioperatively w/holding parameters SBP <110 and/or HR <60  -Can resume full AC (Eliquis 2.5mg BID or heparin gtt) within 24h post op given elevated CHADSVASC score  -Please have the pt f/u with a cardiologist within 2-4 weeks post DC    #HTN  -currently normotensive  -c/w torsemide 10mg qd      Recommendations above are preliminary pending discussion with an attending cardiologist  Plan was discussed with primary team  We'll continue to follow, thank you for the consultation      Dilan Guzman ([id number])  Cardiovascular Disease Fellow  Consult Pager: [phone]         89F pmhx HTN, GIB, pAfib on Eliquis (reduced dosage), HFpEF, s/p Yudith 7/3/2019 w/severe residual MR and Severe TR, known IRBBB, OA, and polyneuropathy was BIBEMS after mechanical fall and was found to have an acute nondisplaced intertrochanteric fracture at the left proximal femur on MRI. Cardiology consulted for perioperative risk assessment for planned left femur IMN on 2/26/24.    Review of Studies:  ECG 2/20/24: Afib w/IRBBB and non-specific ST&T wave changes  ECG 2/25/24: Afib w/RBBB, PVCs, motion artifact   TTE 6/23': Mildly dilated left ventricular size, Normal left ventricular systolic function. Mildly dilated right ventricular size.  Mildly reduced right ventricular systolic fx, Severe RONI, Severe MR, Severe TR, Pulmonary hypertension present,  Two YUDITH devices are in stable position across A2-P2    Outpatient Cardiologists: Dr. Ruiz, Dr. Rojas (structural)    Home Meds: Eliquis 2.5mg BID, Torsemide 20mg qd, Toprol 25mg XL qd      Recommendations:    #Perioperative Cardiovascular Risk Assessment  -RCRI 1, [place] 0.3% risk for MI or cardiac arrest during the procedure and up to 30d  -No evidence of ACS, decompensated HF, severe AS, and tachyarrhythmia on clinical and physical exam assessment  -Had 4> METs of exercise tolerance prior to the hospitalization/fall  -No further cardiac w/u required, pt is deemed cardiovascularly optimized for the procedure and may proceed  -Deemed intermediate to high risk of perioperative cardiovascular complications for an intermediate risk procedure  -C/w Toprol 25mg XL qd perioperatively  - Recommend cardiac anesthesia     #Severe TR  #Severe MR s/p Yudith Mitral Clip  #HFpEF (ACC Stage B, NYHA Class 2)  Etiology: likely 2/2 valvular disease, age, HTN  Hemodynamics: HD sound  Perfusion: warm and well perfused  Inopressors: n/a  GDMT: SGLT2i upon d/c  Diuretics: euvolemic on exam, strict I/Os and daily weights, avoid aggressive hydration via IVF given HFpEF, c/w torsemide 20mg qd while NPO  Device: n/a  AT: n/a  Please ensure patient has f/u with Dr. Rojas within 2-4 weeks of d/c      #pAfib  -Etiology likely 2/2 HFpEF and severe MR  -[id number]: 5  Euvolemic on exam  -Please obtain a TSH w/reflex FT4  -Currently rate controlled w AVN blocking agent  -C/w Toprol 25mg XL qd perioperatively w/holding parameters SBP <110 and/or HR <60  -Can resume full AC (Eliquis 2.5mg BID or heparin gtt) within 24h post op given elevated CHADSVASC score  -Please have the pt f/u with a cardiologist within 2-4 weeks post DC    #HTN  -currently normotensive  -c/w torsemide 10mg qd      Recommendations above are preliminary pending discussion with an attending cardiologist  Plan was discussed with primary team  We'll continue to follow, thank you for the consultation      Dilan Guzman ([id number])  Cardiovascular Disease Fellow  Consult Pager: [phone]

## 2024-02-25 NOTE — CONSULT NOTE ADULT - SUBJECTIVE AND OBJECTIVE BOX
**Incomplete Note**    Cardiology Consult      HPI:  89F w/ PMH HTN, GIB, paroxysmal afib (on eliquis), HFpEF, severe MR s/p transcatheter mitral valve repair using Saulo on 07/3/19 (Cl[employer] IID research trial), severe TR, LBP/OA, gingivitis, BIBEMS for mechanical fall that occurred after she tripped and fell. Pt states she was coming out of the elevator in her apartment building when another tenant was going into the elevator at the same time; they bumped into each other and both fell. Patient fell onto the other woman, does not remember which side of her body she fell onto, however she denies LOC, denies head trauma, denies any prodromal symptoms. Her superintendent called EMS. Pt has not attempted to ambulate since the fall. Denies LLE pain at rest but has sharp pain when she moves. No other complaints at this time. Was in her usual state of health prior to the fall.    ED course:  Vitals: afebrile, HR 79, /86, RR 16, SpO2 97% on RA.  Labs: CBC wnl, BUN/Cr 24/1.17, eGFR 43.  EKG: afib with incomplete RBBB  Imaging:   *CT head: no acute intracranial hemorrhage or fracture; superior ophthalmic veins (R>L) increased in size since 2/1/23 (nonspecific), no hyperdense regions to suggest thrombosis.  *CT pelvis: cortical thickening L hemipelvis consistent w/ Paget's disease; mild degenerative changes b/l hips; fat-containing L inguinal hernia w/o incarceration.  *CT L femur: nondisplaced fracture greater trochanter proximal L femur.  Interventions:  cc x1  Consults: ortho (21 Feb 2024 01:50)        Pt seen and examined at bedside, NAD, denies chest pain/pressure/discomfort. ROS s/f ?,      Review of Systems:  CONSTITUTIONAL:  No weight loss, fever, chills, weakness or fatigue.  HEENT:  Eyes:  No visual loss, blurred vision, double vision or yellow sclerae. Ears, Nose, Throat:  No hearing loss, sneezing, congestion, runny nose or sore throat.  SKIN:  No rash or itching.  CARDIOVASCULAR:  No chest pain, chest pressure or chest discomfort. No palpitations or edema.  RESPIRATORY:  No shortness of breath, cough or sputum.  GASTROINTESTINAL:  No anorexia, nausea, vomiting or diarrhea. No abdominal pain or blood.  GENITOURINARY: No Burning on urination.   NEUROLOGICAL:  see HPI  MUSCULOSKELETAL:  No muscle, back pain, joint pain or stiffness.  HEMATOLOGIC:  No anemia, bleeding or bruising.  LYMPHATICS:  No enlarged nodes. No history of splenectomy.  PSYCHIATRIC:  No history of depression or anxiety.  ENDOCRINOLOGIC:  No reports of sweating, cold or heat intolerance. No polyuria or polydipsia.  ALLERGIES:  No history of asthma, hives, eczema or rhinitis.    PAST MEDICAL & SURGICAL HISTORY:  Diastolic heart failure      Mitral regurgitation      Atrial fibrillation      HTN (hypertension)      H/O exploratory laparotomy      H/O total hysterectomy        SOCIAL HISTORY:  FAMILY HISTORY:    ALLERGIES: 	  penicillin (Hives; Blisters)          MEDICATIONS:  acetaminophen     Tablet .. 975 milliGRAM(s) Oral every 6 hours  gabapentin Solution 100 milliGRAM(s) Oral three times a day  heparin   Injectable 5000 Unit(s) SubCutaneous once  latanoprost 0.005% Ophthalmic Solution 1 Drop(s) Both EYES at bedtime  lidocaine   4% Patch 1 Patch Transdermal daily  metoprolol succinate ER 25 milliGRAM(s) Oral every 24 hours  oxyCODONE    IR 2.5 milliGRAM(s) Oral every 6 hours PRN  polyethylene glycol 3350 17 Gram(s) Oral every 12 hours  senna 2 Tablet(s) Oral at bedtime  torsemide 10 milliGRAM(s) Oral daily      T(C): 36.6 (02-25-24 @ 16:55), Max: 36.7 (02-24-24 @ 20:38)  HR: 75 (02-25-24 @ 16:55) (69 - 82)  BP: 120/76 (02-25-24 @ 16:55) (119/89 - 128/88)  RR: 17 (02-25-24 @ 16:55) (17 - 18)  SpO2: 95% (02-25-24 @ 16:55) (95% - 98%)    02-24-24 @ 07:01  -  02-25-24 @ 07:00  --------------------------------------------------------  IN: 500 mL / OUT: 650 mL / NET: -150 mL        PHYSICAL EXAM:    Constitutional: resting comfortably in bed; NAD  HEENT: NC/AT, PERRL, EOMI, anicteric sclera, no nasal discharge; uvula midline, no oropharyngeal erythema or exudates; MMM  Neck: supple; no thyromegaly, JVP ? cm H20, JVD +/-  Respiratory: CTA B/L; no W/R/R, no retractions  Cardiac: +S1/S2; RRR; no M/R/G; PMI non-displaced  Gastrointestinal: soft, NT/ND; no rebound or guarding; +BSx4  Extremities: WWP, no clubbing or cyanosis; no peripheral edema  Musculoskeletal: NROM x4; no joint swelling, tenderness or erythema  Vascular: 2+ radial, DP/PT pulses B/L  Dermatologic: skin warm, dry and intact; no rashes, wounds, or scars  Lymphatic: no submandibular or cervical LAD  Neurologic: AAOx3; CNII-XII grossly intact; no focal deficits        I&O's Summary    24 Feb 2024 07:01  -  25 Feb 2024 07:00  --------------------------------------------------------  IN: 500 mL / OUT: 650 mL / NET: -150 mL        LABS:	 	                        14.7   7.98  )-----------( 147      ( 25 Feb 2024 08:17 )             44.7     02-25    137  |  103  |  33<H>  ----------------------------<  75  4.0   |  21<L>  |  1.09    Ca    9.0      25 Feb 2024 08:17  Phos  3.6     02-25  Mg     2.2     02-25          proBNP:   Lipid Profile:   HgA1c:   TSH:     TELEMETRY: 	    ECG:  	  RADIOLOGY:   ECHO:  STRESS:  CATH:   Cardiology Consult      HPI:  89F w/ PMH HTN, GIB, paroxysmal afib (on eliquis), HFpEF, severe MR s/p transcatheter mitral valve repair using Saulo on 07/3/19 (Clasp IID research trial), severe TR, LBP/OA, gingivitis, BIBEMS for mechanical fall that occurred after she tripped and fell. Pt states she was coming out of the elevator in her apartment building when another tenant was going into the elevator at the same time; they bumped into each other and both fell. Patient fell onto the other woman, does not remember which side of her body she fell onto, however she denies LOC, denies head trauma, denies any prodromal symptoms. Her superintendent called EMS. Pt has not attempted to ambulate since the fall. Denies LLE pain at rest but has sharp pain when she moves. No other complaints at this time. Was in her usual state of health prior to the fall.    ED course:  Vitals: afebrile, HR 79, /86, RR 16, SpO2 97% on RA.  Labs: CBC wnl, BUN/Cr 24/1.17, eGFR 43.  EKG: afib with incomplete RBBB  Imaging:   *CT head: no acute intracranial hemorrhage or fracture; superior ophthalmic veins (R>L) increased in size since 2/1/23 (nonspecific), no hyperdense regions to suggest thrombosis.  *CT pelvis: cortical thickening L hemipelvis consistent w/ Paget's disease; mild degenerative changes b/l hips; fat-containing L inguinal hernia w/o incarceration.  *CT L femur: nondisplaced fracture greater trochanter proximal L femur.  Interventions:  cc x1  Consults: ortho (21 Feb 2024 01:50)        Pt seen and examined at bedside, NAD, denies chest pain/pressure/discomfort. ROS negative      Review of Systems:  CONSTITUTIONAL:  No weight loss, fever, chills, weakness or fatigue.  HEENT:  Eyes:  No visual loss, blurred vision, double vision or yellow sclerae. Ears, Nose, Throat:  No hearing loss, sneezing, congestion, runny nose or sore throat.  SKIN:  No rash or itching.  CARDIOVASCULAR:  No chest pain, chest pressure or chest discomfort. No palpitations or edema.  RESPIRATORY:  No shortness of breath, cough or sputum.  GASTROINTESTINAL:  No anorexia, nausea, vomiting or diarrhea. No abdominal pain or blood.  GENITOURINARY: No Burning on urination.   NEUROLOGICAL:  see HPI  MUSCULOSKELETAL:  No muscle, back pain, joint pain or stiffness.  HEMATOLOGIC:  No anemia, bleeding or bruising.  LYMPHATICS:  No enlarged nodes. No history of splenectomy.  PSYCHIATRIC:  No history of depression or anxiety.  ENDOCRINOLOGIC:  No reports of sweating, cold or heat intolerance. No polyuria or polydipsia.  ALLERGIES:  No history of asthma, hives, eczema or rhinitis.    PAST MEDICAL & SURGICAL HISTORY:  Diastolic heart failure      Mitral regurgitation      Atrial fibrillation      HTN (hypertension)      H/O exploratory laparotomy      H/O total hysterectomy        SOCIAL HISTORY:  FAMILY HISTORY:    ALLERGIES: 	  penicillin (Hives; Blisters)          MEDICATIONS:  acetaminophen     Tablet .. 975 milliGRAM(s) Oral every 6 hours  gabapentin Solution 100 milliGRAM(s) Oral three times a day  heparin   Injectable 5000 Unit(s) SubCutaneous once  latanoprost 0.005% Ophthalmic Solution 1 Drop(s) Both EYES at bedtime  lidocaine   4% Patch 1 Patch Transdermal daily  metoprolol succinate ER 25 milliGRAM(s) Oral every 24 hours  oxyCODONE    IR 2.5 milliGRAM(s) Oral every 6 hours PRN  polyethylene glycol 3350 17 Gram(s) Oral every 12 hours  senna 2 Tablet(s) Oral at bedtime  torsemide 10 milliGRAM(s) Oral daily      T(C): 36.6 (02-25-24 @ 16:55), Max: 36.7 (02-24-24 @ 20:38)  HR: 75 (02-25-24 @ 16:55) (69 - 82)  BP: 120/76 (02-25-24 @ 16:55) (119/89 - 128/88)  RR: 17 (02-25-24 @ 16:55) (17 - 18)  SpO2: 95% (02-25-24 @ 16:55) (95% - 98%)    02-24-24 @ 07:01  -  02-25-24 @ 07:00  --------------------------------------------------------  IN: 500 mL / OUT: 650 mL / NET: -150 mL        PHYSICAL EXAM:    Constitutional: frail elderly woman resting comfortably in bed; NAD  HEENT: NC/AT, PERRL, EOMI, anicteric sclera, no nasal discharge; uvula midline, no oropharyngeal erythema or exudates; MMM  Neck: supple; no thyromegaly, JVP <8 cm H20, JVD -  Respiratory: CTA B/L; no W/R/R, no retractions  Cardiac: +S1/S2; RRR; 4/6 holosystolic murmur heard loudest at the apex, no R/G; PMI non-displaced  Gastrointestinal: soft, NT/ND; no rebound or guarding; +BSx4  Extremities: WWP, no clubbing or cyanosis; no peripheral edema  Musculoskeletal: NROM x4; no joint swelling, tenderness or erythema  Vascular: 2+ radial, DP/PT pulses B/L  Dermatologic: skin warm, dry and intact; no rashes, wounds, or scars  Lymphatic: no submandibular or cervical LAD  Neurologic: AAOx3; CNII-XII grossly intact; no focal deficits        I&O's Summary    24 Feb 2024 07:01  -  25 Feb 2024 07:00  --------------------------------------------------------  IN: 500 mL / OUT: 650 mL / NET: -150 mL        LABS:	 	                        14.7   7.98  )-----------( 147      ( 25 Feb 2024 08:17 )             44.7     02-25    137  |  103  |  33<H>  ----------------------------<  75  4.0   |  21<L>  |  1.09    Ca    9.0      25 Feb 2024 08:17  Phos  3.6     02-25  Mg     2.2     02-25     Cardiology Consult    HPI:  89F w/ PMH HTN, GIB, paroxysmal afib (on eliquis), HFpEF, severe MR s/p transcatheter mitral valve repair using Saulo on 07/3/19 (Clasp IID research trial), severe TR, LBP/OA, gingivitis, BIBEMS for mechanical fall that occurred after she tripped and fell. Pt states she was coming out of the elevator in her apartment building when another tenant was going into the elevator at the same time; they bumped into each other and both fell. Patient fell onto the other woman, does not remember which side of her body she fell onto, however she denies LOC, denies head trauma, denies any prodromal symptoms. Her superintendent called EMS. Pt has not attempted to ambulate since the fall. Denies LLE pain at rest but has sharp pain when she moves. No other complaints at this time. Was in her usual state of health prior to the fall.    ED course:  Vitals: afebrile, HR 79, /86, RR 16, SpO2 97% on RA.  Labs: CBC wnl, BUN/Cr 24/1.17, eGFR 43.  EKG: afib with incomplete RBBB  Imaging:   *CT head: no acute intracranial hemorrhage or fracture; superior ophthalmic veins (R>L) increased in size since 2/1/23 (nonspecific), no hyperdense regions to suggest thrombosis.  *CT pelvis: cortical thickening L hemipelvis consistent w/ Paget's disease; mild degenerative changes b/l hips; fat-containing L inguinal hernia w/o incarceration.  *CT L femur: nondisplaced fracture greater trochanter proximal L femur.  Interventions:  cc x1  Consults: ortho (21 Feb 2024 01:50)        Pt seen and examined at bedside, NAD, denies chest pain/pressure/discomfort. ROS negative      Review of Systems:  CONSTITUTIONAL:  No weight loss, fever, chills, weakness or fatigue.  HEENT:  Eyes:  No visual loss, blurred vision, double vision or yellow sclerae. Ears, Nose, Throat:  No hearing loss, sneezing, congestion, runny nose or sore throat.  SKIN:  No rash or itching.  CARDIOVASCULAR:  No chest pain, chest pressure or chest discomfort. No palpitations or edema.  RESPIRATORY:  No shortness of breath, cough or sputum.  GASTROINTESTINAL:  No anorexia, nausea, vomiting or diarrhea. No abdominal pain or blood.  GENITOURINARY: No Burning on urination.   NEUROLOGICAL:  see HPI  MUSCULOSKELETAL:  No muscle, back pain, joint pain or stiffness.  HEMATOLOGIC:  No anemia, bleeding or bruising.  LYMPHATICS:  No enlarged nodes. No history of splenectomy.  PSYCHIATRIC:  No history of depression or anxiety.  ENDOCRINOLOGIC:  No reports of sweating, cold or heat intolerance. No polyuria or polydipsia.  ALLERGIES:  No history of asthma, hives, eczema or rhinitis.    PAST MEDICAL & SURGICAL HISTORY:  Diastolic heart failure      Mitral regurgitation      Atrial fibrillation      HTN (hypertension)      H/O exploratory laparotomy      H/O total hysterectomy        SOCIAL HISTORY:  FAMILY HISTORY:    ALLERGIES: 	  penicillin (Hives; Blisters)          MEDICATIONS:  acetaminophen     Tablet .. 975 milliGRAM(s) Oral every 6 hours  gabapentin Solution 100 milliGRAM(s) Oral three times a day  heparin   Injectable 5000 Unit(s) SubCutaneous once  latanoprost 0.005% Ophthalmic Solution 1 Drop(s) Both EYES at bedtime  lidocaine   4% Patch 1 Patch Transdermal daily  metoprolol succinate ER 25 milliGRAM(s) Oral every 24 hours  oxyCODONE    IR 2.5 milliGRAM(s) Oral every 6 hours PRN  polyethylene glycol 3350 17 Gram(s) Oral every 12 hours  senna 2 Tablet(s) Oral at bedtime  torsemide 10 milliGRAM(s) Oral daily      T(C): 36.6 (02-25-24 @ 16:55), Max: 36.7 (02-24-24 @ 20:38)  HR: 75 (02-25-24 @ 16:55) (69 - 82)  BP: 120/76 (02-25-24 @ 16:55) (119/89 - 128/88)  RR: 17 (02-25-24 @ 16:55) (17 - 18)  SpO2: 95% (02-25-24 @ 16:55) (95% - 98%)    02-24-24 @ 07:01  -  02-25-24 @ 07:00  --------------------------------------------------------  IN: 500 mL / OUT: 650 mL / NET: -150 mL        PHYSICAL EXAM:    Constitutional: frail elderly woman resting comfortably in bed; NAD  HEENT: NC/AT, PERRL, EOMI, anicteric sclera, no nasal discharge; uvula midline, no oropharyngeal erythema or exudates; MMM  Neck: supple; no thyromegaly, JVP <8 cm H20, JVD -  Respiratory: CTA B/L; no W/R/R, no retractions  Cardiac: +S1/S2; RRR; 4/6 holosystolic murmur heard loudest at the apex, no R/G; PMI non-displaced  Gastrointestinal: soft, NT/ND; no rebound or guarding; +BSx4  Extremities: WWP, no clubbing or cyanosis; no peripheral edema  Musculoskeletal: NROM x4; no joint swelling, tenderness or erythema  Vascular: 2+ radial, DP/PT pulses B/L  Dermatologic: skin warm, dry and intact; no rashes, wounds, or scars  Lymphatic: no submandibular or cervical LAD  Neurologic: AAOx3; CNII-XII grossly intact; no focal deficits        I&O's Summary    24 Feb 2024 07:01  -  25 Feb 2024 07:00  --------------------------------------------------------  IN: 500 mL / OUT: 650 mL / NET: -150 mL        LABS:	 	                        14.7   7.98  )-----------( 147      ( 25 Feb 2024 08:17 )             44.7     02-25    137  |  103  |  33<H>  ----------------------------<  75  4.0   |  21<L>  |  1.09    Ca    9.0      25 Feb 2024 08:17  Phos  3.6     02-25  Mg     2.2     02-25

## 2024-02-25 NOTE — PROGRESS NOTE ADULT - PROBLEM SELECTOR PLAN 6
F: None   E: Replete as necessary K>4 Mg>2  N: soft and bite sized DASH diet when she passes dysphagia screen  DVT Prophylaxis: eliquis  GI prophylaxis: None   CODE STATUS: FULL CODE  Dispo: [facility]

## 2024-02-25 NOTE — PROGRESS NOTE ADULT - SUBJECTIVE AND OBJECTIVE BOX
INTERVAL HPI/OVERNIGHT EVENTS:  Patient was seen and examined at bedside. As per nurse and patient, no o/n events, patient resting comfortably. No complaints at this time. Patient denies: fever, chills, lightheadedness, weakness, CP, palpitations, SOB, cough, N/V. ROS otherwise negative.    VITAL SIGNS:  T(F): 97.7 (02-25-24 @ 05:54)  HR: 69 (02-25-24 @ 05:54)  BP: 128/88 (02-25-24 @ 05:54)  RR: 18 (02-25-24 @ 05:54)  SpO2: 96% (02-25-24 @ 05:54)  Wt(kg): --      02-24-24 @ 07:01  -  02-25-24 @ 07:00  --------------------------------------------------------  IN: 500 mL / OUT: 650 mL / NET: -150 mL        PHYSICAL EXAM:  GENERAL: NAD, lying in bed comfortably, hoarse voice   NECK: Supple, trachea midline, no JVD  HEART: Regular rate and rhythm, no murmurs, rubs, or gallops  LUNGS: Unlabored respirations.  Clear to auscultation bilaterally, no crackles, wheezing, or rhonchi  ABDOMEN: Soft, nontender, nondistended, +BS  EXTREMITIES: 2+ peripheral pulses bilaterally. No clubbing, cyanosis, or edema. LLE pain with movement   NERVOUS SYSTEM:  A&Ox3, moving all extremities, no focal deficits   SKIN: No rashes or lesions    MEDICATIONS  (STANDING):  acetaminophen     Tablet .. 975 milliGRAM(s) Oral every 6 hours  gabapentin Solution 100 milliGRAM(s) Oral three times a day  heparin   Injectable 5000 Unit(s) SubCutaneous every 8 hours  latanoprost 0.005% Ophthalmic Solution 1 Drop(s) Both EYES at bedtime  lidocaine   4% Patch 1 Patch Transdermal daily  metoprolol succinate ER 25 milliGRAM(s) Oral every 24 hours  polyethylene glycol 3350 17 Gram(s) Oral every 12 hours  senna 2 Tablet(s) Oral at bedtime  torsemide 10 milliGRAM(s) Oral daily    MEDICATIONS  (PRN):  oxyCODONE    IR 2.5 milliGRAM(s) Oral every 6 hours PRN Severe Pain (7 - 10)      Allergies    penicillin (Hives; Blisters)    Intolerances        LABS:                        14.7   7.98  )-----------( 147      ( 25 Feb 2024 08:17 )             44.7     02-25    137  |  103  |  33<H>  ----------------------------<  75  4.0   |  21<L>  |  1.09    Ca    9.0      25 Feb 2024 08:17  Phos  3.6     02-25  Mg     2.2     02-25        Urinalysis Basic - ( 25 Feb 2024 08:17 )    Color: x / Appearance: x / SG: x / pH: x  Gluc: 75 mg/dL / Ketone: x  / Bili: x / Urobili: x   Blood: x / Protein: x / Nitrite: x   Leuk Esterase: x / RBC: x / WBC x   Sq Epi: x / Non Sq Epi: x / Bacteria: x        RADIOLOGY & ADDITIONAL TESTS:  Reviewed

## 2024-02-26 NOTE — PROGRESS NOTE ADULT - PROBLEM SELECTOR PLAN 6
F: None   E: Replete as necessary K>4 Mg>2  N: soft and bite sized DASH diet when she passes dysphagia screen  DVT Prophylaxis: hold ISO pending surgery, plan to restart when appropriate   GI prophylaxis: None   CODE STATUS: FULL CODE  Dispo: [facility]

## 2024-02-26 NOTE — PROGRESS NOTE ADULT - PROBLEM SELECTOR PLAN 5
TTE 06/2023 EF 55-60%, mildly dilated LV, mildly dilated RV, mildly reduced RV systolic function, severe biatrial enlargement, severe MR, severe TR, pulmonary HTN, two YUDITH devices in stable position across A2-P2 (unchanged singe 06/2023). Pt underwent percutaneous transcatheter repair of mitral valve using multiple leaflet clips in 07/2019 w/ Dr. Rojas. Home meds: torsemide 20mg qd. Follows w/ Dr. Ruiz and Dr. Rojas. Torsemide was recently increased from 10mg to 20mg and ASA was recently discontinued - both managed by Dr. Ruiz, who was also considering starting spironolactone 12.5mg on next visit. Unclear if pt was compliant w/ torsemide daily as she still had BLE edema, none at this time.  - c/w torsemide 10mg PO qd      ProBNP Trend from [facility]    2.24.24 (This admission) --- [id number]  9.22.23                          --- [phone]  5.16.23                          --- [phone]  4.18.23                          --- [phone]    Cardiology for pre-op eval to help with volume optimization if planning for surgery   12.22.22                        --- [phone]

## 2024-02-26 NOTE — PROGRESS NOTE ADULT - SUBJECTIVE AND OBJECTIVE BOX
INTERVAL HPI/OVERNIGHT EVENTS:  Patient was seen and examined at bedside. As per nurse and patient, no o/n events, patient resting comfortably. No complaints at this time. Patient denies: fever, chills, lightheadedness, weakness, CP, palpitations, SOB, cough, N/V. ROS otherwise negative. Plan for OR today     VITAL SIGNS:  T(F): 97.6 (24 @ 05:58)  HR: 73 (24 @ 05:58)  BP: 124/88 (24 @ 05:58)  RR: 18 (24 @ 05:58)  SpO2: 98% (24 @ 05:58)  Wt(kg): --        PHYSICAL EXAM:    Constitutional: resting comfortably in bed; NAD  HEENT: NC/AT, PER, anicteric sclera, no nasal discharge; MMM  Neck: supple; no JVD or thyromegaly  Respiratory: CTA B/L; no W/R/R, no retractions  Cardiac: +S1/S2; RRR; no M/R/G  Gastrointestinal: soft, NT/ND; no rebound or guarding  Back: spine midline, no bony tenderness or step-offs; no CVAT B/L  Extremities: WWP, no clubbing or cyanosis; no peripheral edema  Musculoskeletal: NROM x4; no joint swelling, tenderness or erythema  Vascular: 2+ radial, DP/PT pulses B/L  Dermatologic: skin warm, dry and intact; no rashes, wounds, or scars  Neurologic: AAOx3; CNII-XII grossly intact; no focal deficits  Psychiatric: affect and characteristics of appearance, verbalizations, behaviors are appropriate    MEDICATIONS  (STANDING):  acetaminophen     Tablet .. 975 milliGRAM(s) Oral every 6 hours  gabapentin Solution 100 milliGRAM(s) Oral three times a day  lactated ringers. 1000 milliLiter(s) (60 mL/Hr) IV Continuous <Continuous>  latanoprost 0.005% Ophthalmic Solution 1 Drop(s) Both EYES at bedtime  lidocaine   4% Patch 1 Patch Transdermal daily  metoprolol succinate ER 25 milliGRAM(s) Oral every 24 hours  polyethylene glycol 3350 17 Gram(s) Oral every 12 hours  povidone iodine 5% Nasal Swab 1 Application(s) Both Nostrils once  senna 2 Tablet(s) Oral at bedtime  torsemide 10 milliGRAM(s) Oral daily    MEDICATIONS  (PRN):  benzocaine 20% Spray 1 Spray(s) Topical every 6 hours PRN Sore Throat Pain  oxyCODONE    IR 2.5 milliGRAM(s) Oral every 6 hours PRN Severe Pain (7 - 10)      Allergies    penicillin (Hives; Blisters)    Intolerances        LABS:                        14.7   7.98  )-----------( 147      ( 2024 08:17 )             44.7     02    137  |  103  |  33<H>  ----------------------------<  75  4.0   |  21<L>  |  1.09    Ca    9.0      2024 08:17  Phos  3.6     0225  Mg     2.2     02      PT/INR - ( 2024 22:00 )   PT: 13.4 sec;   INR: 1.18          PTT - ( 2024 22:00 )  PTT:33.3 sec  Urinalysis Basic - ( 2024 17:12 )    Color: Dark Yellow / Appearance: Clear / S.029 / pH: x  Gluc: x / Ketone: Trace mg/dL  / Bili: Negative / Urobili: 1.0 mg/dL   Blood: x / Protein: Trace mg/dL / Nitrite: Negative   Leuk Esterase: Trace / RBC: 2 /HPF / WBC 0 /HPF   Sq Epi: x / Non Sq Epi: 1 /HPF / Bacteria: Negative /HPF        RADIOLOGY & ADDITIONAL TESTS:  Reviewed

## 2024-02-26 NOTE — PROGRESS NOTE ADULT - PROBLEM SELECTOR PLAN 3
Hx of paroxysmal afib. Home meds: eliquis 2.5mg BID, toprol 25mg qd. Follows w/ Dr. Ruiz. EKG on admission HR 75, afib with incomplete RBBB, QTc 469 however there is motion artifact.  -  restart eliquis 2.5mg PO   - c/w toprol 25mg PO qd Hx of paroxysmal afib. Home meds: eliquis 2.5mg BID, toprol 25mg qd. Follows w/ Dr. Ruiz. EKG on admission HR 75, afib with incomplete RBBB, QTc 469 however there is motion artifact.  -  eliquis 2.5mg PO, hold iso pending  surgery, plan to restart when appropriate   - c/w toprol 25mg PO qd

## 2024-02-26 NOTE — PROGRESS NOTE ADULT - SUBJECTIVE AND OBJECTIVE BOX
Ortho Note    Pt comfortable without complaints, pain controlled  Denies CP, SOB, N/V, numbness/tingling     Vital Signs Last 24 Hrs  T(C): 36.4 (02-26-24 @ 05:58), Max: 36.4 (02-26-24 @ 05:58)  T(F): 97.6 (02-26-24 @ 05:58), Max: 97.6 (02-26-24 @ 05:58)  HR: 73 (02-26-24 @ 05:58) (73 - 73)  BP: 124/88 (02-26-24 @ 05:58) (124/88 - 124/88)  BP(mean): --  RR: 18 (02-26-24 @ 05:58) (18 - 18)  SpO2: 98% (02-26-24 @ 05:58) (98% - 98%)  I&O's Summary    24 Feb 2024 07:01  -  25 Feb 2024 07:00  --------------------------------------------------------  IN: 500 mL / OUT: 650 mL / NET: -150 mL        Physical Exam:  General: NAD, resting comfortably in bed  Focused L hip examination  TTP @ L GT   pain with log roll   silt sural/saph/dpn/spn/tib   5/5 ehl/fhl/ta/gs                           14.7   7.98  )-----------( 147      ( 25 Feb 2024 08:17 )             44.7     02-25    137  |  103  |  33<H>  ----------------------------<  75  4.0   |  21<L>  |  1.09    Ca    9.0      25 Feb 2024 08:17  Phos  3.6     02-25  Mg     2.2     02-25        A/P: 89yF w/ non displaced L GT Fx best appreciated on CT L hip. L occult IT fx noted on MR    - Added on for OR today for L hip IMN  - Cleared by Cardiology ON given Hx MR/TR  - Consent/pre-op labs done  - NWB LLE      Ortho Pager [phone] Ortho Note    Pt comfortable without complaints, pain controlled  Denies CP, SOB, N/V, numbness/tingling     Vital Signs Last 24 Hrs  T(C): 36.4 (02-26-24 @ 05:58), Max: 36.4 (02-26-24 @ 05:58)  T(F): 97.6 (02-26-24 @ 05:58), Max: 97.6 (02-26-24 @ 05:58)  HR: 73 (02-26-24 @ 05:58) (73 - 73)  BP: 124/88 (02-26-24 @ 05:58) (124/88 - 124/88)  BP(mean): --  RR: 18 (02-26-24 @ 05:58) (18 - 18)  SpO2: 98% (02-26-24 @ 05:58) (98% - 98%)  I&O's Summary    24 Feb 2024 07:01  -  25 Feb 2024 07:00  --------------------------------------------------------  IN: 500 mL / OUT: 650 mL / NET: -150 mL        Physical Exam:  General: NAD, resting comfortably in bed  Focused L hip examination  TTP @ L GT   pain with log roll   silt sural/saph/dpn/spn/tib   5/5 ehl/fhl/ta/gs                           14.7   7.98  )-----------( 147      ( 25 Feb 2024 08:17 )             44.7     02-25    137  |  103  |  33<H>  ----------------------------<  75  4.0   |  21<L>  |  1.09    Ca    9.0      25 Feb 2024 08:17  Phos  3.6     02-25  Mg     2.2     02-25        A/P: 89yF w/ non displaced L GT Fx best appreciated on CT L hip. L occult IT fx noted on MR    - Added on for OR today for L hip IMN  - Cleared by Cardiology ON given Hx MR/TR  - Consent/pre-op labs done  - Pain control  - ROSSY DEL REALE      Ortho Pager [phone]

## 2024-02-26 NOTE — PROGRESS NOTE ADULT - PROBLEM SELECTOR PLAN 1
Pt presenting for mechanical fall after she bumped into another tenant in her building. CT head: no acute intracranial hemorrhage or fracture; superior ophthalmic veins (R>L) increased in size since 2/1/23 (nonspecific), no hyperdense regions to suggest thrombosis. 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 CT L femur: nondisplaced fracture greater trochanter proximal L femur. Seen by ortho in ED. S/p 1L NS in ED.  MR hip indicating need for surgical intervention   - Plan for OR today (2/26)   - F/u cardiac preoperative clearance   - PT recs  - pain control - tylenol 650mg PO q6h PRN, add PRN oxy 2.5 if needed -- avoid NSAIDs given h/o GIB

## 2024-02-26 NOTE — CHART NOTE - NSCHARTNOTEFT_GEN_A_CORE
Ortho Preop Note    Patient is a 89y old  Female who presents with a chief complaint of L occult IT fx noted on MR    Diagnosis: L occult IT fx  Procedure: L femur IMN  Surgeon: Lona                          14.7   7.98  )-----------( 147      ( 25 Feb 2024 08:17 )             44.7     02-25    137  |  103  |  33<H>  ----------------------------<  75  4.0   |  21<L>  |  1.09    Ca    9.0      25 Feb 2024 08:17  Phos  3.6     02-25  Mg     2.2     02-25        Urinalysis Basic - ( 25 Feb 2024 08:17 )    Color: x / Appearance: x / SG: x / pH: x  Gluc: 75 mg/dL / Ketone: x  / Bili: x / Urobili: x   Blood: x / Protein: x / Nitrite: x   Leuk Esterase: x / RBC: x / WBC x   Sq Epi: x / Non Sq Epi: x / Bacteria: x        [ ] Type & Screen x2  [ ] CBC  [ ] BMP  [ ] PT/PTT/INR  [ ] Urinalysis  [ ] Chest X-ray  [ ] EKG  [ ] NPO/IVF  [ ] Consent  [ ] Clearance  [ ] Added on to OR Schedule  [ ] Anti-coagulation held  [ ] MRSA/MSSA Nasal Screen     Assessment & Plan:  89yFemale with L occult IT fx noted on MR pending OR for L femur IMN  -For OR 2/26 if clears    Ortho Pager [phone]
Admitting Diagnosis:   Patient is a 89y old  Female who presents with a chief complaint of Fall, femoral fracture (24 Feb 2024 21:02)      PAST MEDICAL & SURGICAL HISTORY:  Diastolic heart failure  Mitral regurgitation  Atrial fibrillation  HTN (hypertension)  H/O exploratory laparotomy  H/O total hysterectomy    Current Nutrition Order:   Diet, NPO after Midnight:      NPO Start Date: 25-Feb-2024,   NPO Start Time: 23:59  Except Medications (02-25-24 @ 20:48)      PO Intake: Good (%) [   ]  Fair (50-75%) [   ] Poor (<25%) [ x  ]    GI Issues: none documented    Skin Integrity: stage 2 pressure ulcer to sacrum    Labs:   02-25    137  |  103  |  33<H>  ----------------------------<  75  4.0   |  21<L>  |  1.09    Ca    9.0      25 Feb 2024 08:17  Phos  3.6     02-25  Mg     2.2     02-25      CAPILLARY BLOOD GLUCOSE    Medications:  MEDICATIONS  (STANDING):  acetaminophen     Tablet .. 975 milliGRAM(s) Oral every 6 hours  gabapentin Solution 100 milliGRAM(s) Oral three times a day  lactated ringers. 1000 milliLiter(s) (60 mL/Hr) IV Continuous <Continuous>  latanoprost 0.005% Ophthalmic Solution 1 Drop(s) Both EYES at bedtime  lidocaine   4% Patch 1 Patch Transdermal daily  metoprolol succinate ER 25 milliGRAM(s) Oral every 24 hours  polyethylene glycol 3350 17 Gram(s) Oral every 12 hours  povidone iodine 5% Nasal Swab 1 Application(s) Both Nostrils once  senna 2 Tablet(s) Oral at bedtime  torsemide 10 milliGRAM(s) Oral daily    MEDICATIONS  (PRN):  benzocaine 20% Spray 1 Spray(s) Topical every 6 hours PRN Sore Throat Pain  oxyCODONE    IR 2.5 milliGRAM(s) Oral every 6 hours PRN Severe Pain (7 - 10)      Weight:  Height for BMI (FEET)	5 Feet  Height for BMI (INCHES)	7 Inch(s)  Height for BMI (CENTIMETERS)	170.18 Centimeter(s)  Weight for .4 pounds    Nutrition Focused Physical Exam: see nutrition risk note from 2/21    Estimated energy needs:   Estimated Energy Needs Weight (lbs)	135 lb  Estimated Energy Needs Weight (kg)	61.2 kg  Estimated Energy Needs From (chucho/kg)	30  Estimated Energy Needs To (chucho/kg)	35  Estimated Energy Needs Calculated From (chucho/kg)	1836  Estimated Energy Needs Calculated To (chucho/kg)	2142    Estimated Protein Needs Weight (lbs)	135 lb  Estimated Protein Needs Weight (kg)	61.2 kg  Estimated Protein Needs From (g/kg)	1.2  Estimated Protein Needs To (g/kg)	1.5  Estimated Protein Needs Calculated From (g/kg)	73.44  Estimated Protein Needs Calculated To (g/kg)	91.8  Other Calculations	IBW used to calculate needs since no dosing weight entered in chart. Needs adjusted for advanced age, CHF, and malnutrition. Fluid recs per team.    Subjective: 89F w/ PMH HTN, GIB, paroxysmal afib (on eliquis), HFpEF, severe MR s/p transcatheter mitral valve repair using Saulo on 07/3/19 (Cl[employer] IID research trial), severe TR, LBP/OA, gingivitis, BIBEMS for mechanical fall that occurred after she tripped and fell. Pt states she was coming out of the elevator in her apartment building when another tenant was going into the elevator at the same time; they bumped into each other and both fell. Patient fell onto the other woman, does not remember which side of her body she fell onto, however she denies LOC, denies head trauma, denies any prodromal symptoms. Her superintendent called EMS. Pt has not attempted to ambulate since the fall.    Patient seen at bedside for follow up assessment. Current diet order: NPO. Patient pending ortho procedure today. Patient's niece at bedside with patient reports she has not been eating much. Says she has seen her consume only soup and applesauce. Explained to patient that these foods do not provide many calories and encouraged her to eat more. Reports she likes fish but has not been ordering it here, encouraged her to order fish and provided menu to niece. Also encouraged niece to bring in any food from outside that she knows patient likes once cleared for PO. Patient continuing to refuse oral nutrition supplement. Stage 2 pressure ulcer to sacrum. Elevated BUN. Meds: torsemide, bowel regimen. RD to f/u prn.    Previous Nutrition Diagnosis: Severe protein calorie malnutrition in context of chronic illness related to decreased protein intake as evidenced by <75% of nutrition needs >1 month, severe muscle and fat wasting.    Active [ x  ]  Resolved [   ]    Goal: Patient to meet at least 75% of nutritional needs consistently     Recommendations:  1. Recommend soft and bite sized diet when cleared for PO. Recommend to add MVI.  2. Encourage pt to meet nutritional needs as able   3. Monitor labs: electrolytes, cmp  4. Monitor weights   5. Pain and bowel regimen per team   6. Will continue to assess/honor food preferences as able  7. Monitor adherence to diet education     Education: encouragement for PO intake and ordering protein foods in hospital    Risk Level: High [ x  ] Moderate [   ] Low [   ]

## 2024-02-26 NOTE — PROGRESS NOTE ADULT - SUBJECTIVE AND OBJECTIVE BOX
Ortho Post Op Check    Procedure: Left Femur IMN  Surgeon: Lona    Pt comfortable without complaints, pain controlled  Denies CP, SOB, N/V    Vital Signs Last 24 Hrs  T(C): 36.2 (02-26-24 @ 19:06), Max: 36.3 (02-26-24 @ 16:29)  T(F): 97.2 (02-26-24 @ 19:06), Max: 97.2 (02-26-24 @ 19:06)  HR: 84 (02-26-24 @ 21:06) (72 - 88)  BP: 117/76 (02-26-24 @ 21:06) (109/76 - 117/76)  BP(mean): 90 (02-26-24 @ 21:06) (88 - 94)  RR: 23 (02-26-24 @ 21:06) (17 - 49)  SpO2: 96% (02-26-24 @ 21:06) (94% - 100%)  I&O's Summary      Physical Exam:  General: Pt Alert and oriented, NAD  LLE:  DSG C/D/I, aquacellx2  Pulses: 2+ dp, pt pulses, wwp, cap refill <3 sec  Sensation: SILT in sural/saph/tibial distributions, limited in spn/dpn 2/2 to anesthesia  Motor: Motor exam limited by 2/2 to anesthesia                          14.7   7.98  )-----------( 147      ( 25 Feb 2024 08:17 )             44.7     02-25    137  |  103  |  33<H>  ----------------------------<  75  4.0   |  21<L>  |  1.09    Ca    9.0      25 Feb 2024 08:17  Phos  3.6     02-25  Mg     2.2     02-25        Intraoperative fluroscopy shows hardware in place.     A/P: 89yFemale s/p L HIP IMN with Dr. Zamorano on 2/26  - Stable  - Pain Control  - f/u AM labs  - DVT ppx: Scds  - Post op abx: periop Clinda  - PT, WBS: WBAT LLE  - Dispo: pending PT dirk Thurston, PGY2  Orthopaedic Surgery

## 2024-02-26 NOTE — BRIEF OPERATIVE NOTE - NSICDXBRIEFPROCEDURE_GEN_ALL_CORE_FT
PROCEDURES:  Open reduction and internal fixation of left hip using locking intramedullary mando 26-Feb-2024 19:02:03  Jesse Thurston

## 2024-02-27 NOTE — PROGRESS NOTE ADULT - SUBJECTIVE AND OBJECTIVE BOX
INTERVAL HPI/OVERNIGHT EVENTS:  Patient was seen and examined at bedside. As per nurse and patient, no o/n events, patient resting comfortably. Pt somnolent this morning Aox1. Pt states pain is under control      VITAL SIGNS:  T(F): 98.2 (24 @ 06:36)  HR: 88 (24 @ 07:43)  BP: 107/69 (24 @ 07:43)  RR: 17 (24 @ 07:43)  SpO2: 97% (24 @ 07:43)  Wt(kg): --      24 @ 07:01  -  24 @ 07:00  --------------------------------------------------------  IN: 50 mL / OUT: 0 mL / NET: 50 mL        PHYSICAL EXAM:  GENERAL: NAD, lying in bed comfortably, hoarse voice   NECK: Supple, trachea midline, no JVD  HEART: Regular rate and rhythm, no murmurs, rubs, or gallops  LUNGS: Unlabored respirations.  Clear to auscultation bilaterally, no crackles, wheezing, or rhonchi  ABDOMEN: Soft, nontender, nondistended, +BS  EXTREMITIES: 2+ peripheral pulses bilaterally. No clubbing, cyanosis, or edema. LLE pain with movement   NERVOUS SYSTEM:  A&Ox1, moving all extremities, no focal deficits   SKIN: No rashes or lesions    MEDICATIONS  (STANDING):  acetaminophen     Tablet .. 975 milliGRAM(s) Oral every 6 hours  clindamycin IVPB 900 milliGRAM(s) IV Intermittent every 8 hours  heparin   Injectable 5000 Unit(s) SubCutaneous every 8 hours  polyethylene glycol 3350 17 Gram(s) Oral at bedtime  senna 2 Tablet(s) Oral at bedtime    MEDICATIONS  (PRN):  magnesium hydroxide Suspension 30 milliLiter(s) Oral daily PRN Constipation  ondansetron Injectable 4 milliGRAM(s) IV Push every 6 hours PRN Nausea and/or Vomiting  oxyCODONE    IR 2.5 milliGRAM(s) Oral every 6 hours PRN Severe Pain (7 - 10)      Allergies    penicillin (Hives; Blisters)    Intolerances        LABS:                        13.9   6.74  )-----------( 150      ( 2024 05:30 )             42.6           PT/INR - ( 2024 22:00 )   PT: 13.4 sec;   INR: 1.18          PTT - ( 2024 22:00 )  PTT:33.3 sec  Urinalysis Basic - ( 2024 17:12 )    Color: Dark Yellow / Appearance: Clear / S.029 / pH: x  Gluc: x / Ketone: Trace mg/dL  / Bili: Negative / Urobili: 1.0 mg/dL   Blood: x / Protein: Trace mg/dL / Nitrite: Negative   Leuk Esterase: Trace / RBC: 2 /HPF / WBC 0 /HPF   Sq Epi: x / Non Sq Epi: 1 /HPF / Bacteria: Negative /HPF        RADIOLOGY & ADDITIONAL TESTS:  Reviewed

## 2024-02-27 NOTE — PROGRESS NOTE ADULT - PROBLEM SELECTOR PLAN 1
Pt presenting for mechanical fall after she bumped into another tenant in her building. CT head: no acute intracranial hemorrhage or fracture; superior ophthalmic veins (R>L) increased in size since 2/1/23 (nonspecific), no hyperdense regions to suggest thrombosis. 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 CT L femur: nondisplaced fracture greater trochanter proximal L femur. Seen by ortho in ED. S/p 1L NS in ED.  MR hip indicating need for surgical intervention   S/p fixation with ortho  - PT recs  - pain control - tylenol 650mg PO q6h PRN, add PRN oxy 2.5 if needed -- avoid NSAIDs given h/o GIB Pt presenting for mechanical fall after she bumped into another tenant in her building. CT head: no acute intracranial hemorrhage or fracture; superior ophthalmic veins (R>L) increased in size since 2/1/23 (nonspecific), no hyperdense regions to suggest thrombosis. 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 CT L femur: nondisplaced fracture greater trochanter proximal L femur. Seen by ortho in ED. S/p 1L NS in ED.  MR hip indicating need for surgical intervention   S/p fixation with ortho'  - PT recs  - pain control - tylenol 650mg PO q6h PRN, add PRN oxy 2.5 if needed -- avoid NSAIDs given h/o GIB

## 2024-02-27 NOTE — PROGRESS NOTE ADULT - PROBLEM SELECTOR PLAN 5
TTE 06/2023 EF 55-60%, mildly dilated LV, mildly dilated RV, mildly reduced RV systolic function, severe biatrial enlargement, severe MR, severe TR, pulmonary HTN, two YUDITH devices in stable position across A2-P2 (unchanged singe 06/2023). Pt underwent percutaneous transcatheter repair of mitral valve using multiple leaflet clips in 07/2019 w/ Dr. Rojas. Home meds: torsemide 20mg qd. Follows w/ Dr. Ruiz and Dr. Rojas. Torsemide was recently increased from 10mg to 20mg and ASA was recently discontinued - both managed by Dr. Ruiz, who was also considering starting spironolactone 12.5mg on next visit. Unclear if pt was compliant w/ torsemide daily as she still had BLE edema, none at this time.  - torsemide 10mg PO qd held post op, consider restarting when appropriate       ProBNP Trend from [facility]    2.24.24 (This admission) --- 4699  9.22.23                          --- [phone]  5.16.23                          --- [phone]  4.18.23                          --- [phone]    Cardiology for pre-op eval to help with volume optimization if planning for surgery   12.22.22                        --- [phone]

## 2024-02-27 NOTE — PROGRESS NOTE ADULT - PROBLEM SELECTOR PLAN 3
Hx of paroxysmal afib. Home meds: eliquis 2.5mg BID, toprol 25mg qd. Follows w/ Dr. Ruiz. EKG on admission HR 75, afib with incomplete RBBB, QTc 469 however there is motion artifact.  -  eliquis 2.5mg PO, hold iso pending  surgery, plan to restart when appropriate   -  toprol 25mg PO qd held iso surgery, plan to restart when appropriate

## 2024-02-27 NOTE — PROGRESS NOTE ADULT - TIME BILLING
As above
Bedside exam and interview   Reviewed vitals, labs   Discussed patient's plan of care with housestaff   Documentation of encounter

## 2024-02-27 NOTE — PROGRESS NOTE ADULT - ASSESSMENT
INCOMPLETE    89F pmhx HTN, GIB, pAfib on Eliquis (reduced dosage), HFpEF, s/p Yudith 7/3/2019 w/severe residual MR and Severe TR, known IRBBB, OA, and polyneuropathy was BIBEMS after mechanical fall and was found to have an acute nondisplaced intertrochanteric fracture at the left proximal femur on MRI. Cardiology consulted for perioperative risk assessment for planned left femur IMN on 2/26/24.    Review of Studies:  ECG 2/20/24: Afib w/IRBBB and non-specific ST&T wave changes  ECG 2/25/24: Afib w/RBBB, PVCs, motion artifact   TTE 6/23': Mildly dilated left ventricular size, Normal left ventricular systolic function. Mildly dilated right ventricular size.  Mildly reduced right ventricular systolic fx, Severe RONI, Severe MR, Severe TR, Pulmonary hypertension present,  Two YUDITH devices are in stable position across A2-P2    Outpatient Cardiologists: Dr. Ruiz, Dr. Rojas (structural)    Home Meds: Eliquis 2.5mg BID, Torsemide 20mg qd, Toprol 25mg XL qd      Recommendations:    #Perioperative Cardiovascular Risk Assessment  -RCRI 1, Horton 0.3% risk for MI or cardiac arrest during the procedure and up to 30d  -No evidence of ACS, decompensated HF, severe AS, and tachyarrhythmia on clinical and physical exam assessment  -Had 4> METs of exercise tolerance prior to the hospitalization/fall  -No further cardiac w/u required, pt is deemed cardiovascularly optimized for the procedure and may proceed  -Deemed intermediate to high risk of perioperative cardiovascular complications for an intermediate risk procedure  -C/w Toprol 25mg XL qd perioperatively  - Recommend cardiac anesthesia     #Severe TR  #Severe MR s/p Yudith Mitral Clip  #HFpEF (ACC Stage B, NYHA Class 2)  Etiology: likely 2/2 valvular disease, age, HTN  Hemodynamics: HD sound  Perfusion: warm and well perfused  Inopressors: n/a  GDMT: SGLT2i upon d/c  Diuretics: euvolemic on exam, strict I/Os and daily weights, avoid aggressive hydration via IVF given HFpEF, c/w torsemide 20mg qd while NPO  Device: n/a  AT: n/a  Please ensure patient has f/u with Dr. Rojas within 2-4 weeks of d/c      #pAfib  -Etiology likely 2/2 HFpEF and severe MR  -[id number]: 5  Euvolemic on exam  -Please obtain a TSH w/reflex FT4  -Currently rate controlled w AVN blocking agent  -C/w Toprol 25mg XL qd perioperatively w/holding parameters SBP <110 and/or HR <60  -Can resume full AC (Eliquis 2.5mg BID or heparin gtt) within 24h post op given elevated CHADSVASC score  -Please have the pt f/u with a cardiologist within 2-4 weeks post DC    #HTN  -currently normotensive  -c/w torsemide 10mg qd      Recommendations above are preliminary pending discussion with an attending cardiologist  Plan was discussed with primary team  We'll continue to follow, thank you for the consultation      Dilan Guzman ([id number])  Cardiovascular Disease Fellow  Consult Pager: [phone]         INCOMPLETE    89F pmhx HTN, GIB, pAfib on Eliquis (reduced dosage), HFpEF, s/p Yudith 7/3/2019 w/severe residual MR and Severe TR, known IRBBB, OA, and polyneuropathy was BIBEMS after mechanical fall and was found to have an acute nondisplaced intertrochanteric fracture at the left proximal femur on MRI. Cardiology consulted for perioperative risk assessment for planned left femur IMN on 2/26/24.    Review of Studies:  ECG 2/20/24: Afib w/IRBBB and non-specific ST&T wave changes  ECG 2/25/24: Afib w/RBBB, PVCs, motion artifact   TTE 6/23': Mildly dilated left ventricular size, Normal left ventricular systolic function. Mildly dilated right ventricular size.  Mildly reduced right ventricular systolic fx, Severe RONI, Severe MR, Severe TR, Pulmonary hypertension present,  Two YUDITH devices are in stable position across A2-P2    Outpatient Cardiologists: Dr. Ruiz, Dr. Rojas (structural)    Home Meds: Eliquis 2.5mg BID, Torsemide 20mg qd, Toprol 25mg XL qd      Recommendations:    #Perioperative Cardiovascular Risk Assessment  -RCRI 1, Horton 0.3% risk for MI or cardiac arrest during the procedure and up to 30d  -No evidence of ACS, decompensated HF, severe AS, and tachyarrhythmia on clinical and physical exam assessment  -Had 4> METs of exercise tolerance prior to the hospitalization/fall  -No further cardiac w/u required, pt is deemed cardiovascularly optimized for the procedure and may proceed  -Deemed intermediate to high risk of perioperative cardiovascular complications for an intermediate risk procedure  -C/w Toprol 25mg XL qd perioperatively  - Recommend cardiac anesthesia     #Severe TR  #Severe MR s/p Yudith Mitral Clip  #HFpEF (ACC Stage B, NYHA Class 2)  Etiology: likely 2/2 valvular disease, age, HTN  Hemodynamics: HD sound  Perfusion: warm and well perfused  Inopressors: n/a  GDMT: SGLT2i upon d/c  Diuretics: euvolemic on exam, strict I/Os and daily weights, avoid aggressive hydration via IVF given HFpEF, c/w torsemide 20mg qd while NPO  Device: n/a  AT: n/a  Please ensure patient has f/u with Dr. Rojas within 2-4 weeks of d/c      #pAfib  -Etiology likely 2/2 HFpEF and severe MR  -[id number]: 5  Euvolemic on exam  -Please obtain a TSH w/reflex FT4  -Currently rate controlled w AVN blocking agent  -C/w Toprol 25mg XL qd perioperatively w/holding parameters SBP <110 and/or HR <60  -Can resume full AC (Eliquis 2.5mg BID or heparin gtt) within 24h post op given elevated CHADSVASC score  -Please have the pt f/u with a cardiologist within 2-4 weeks post DC    #HTN  -currently normotensive  -c/w torsemide 10mg qd      Recommendations above are preliminary pending discussion with an attending cardiologist  Plan was discussed with primary team  We'll continue to follow, thank you for the consultation       89F pmhx HTN, GIB, pAfib on Eliquis (reduced dosage), HFpEF, s/p Yudith 7/3/2019 w/severe residual MR and Severe TR, known IRBBB, OA, and polyneuropathy was BIBEMS after mechanical fall and was found to have an acute nondisplaced intertrochanteric fracture at the left proximal femur on MRI. Cardiology consulted for perioperative risk assessment for planned left femur IMN on 2/26/24.    Review of Studies:  ECG 2/20/24: Afib w/IRBBB and non-specific ST&T wave changes  ECG 2/25/24: Afib w/RBBB, PVCs, motion artifact   TTE 6/23: Mildly dilated left ventricular size, Normal left ventricular systolic function. Mildly dilated right ventricular size.  Mildly reduced right ventricular systolic fx, Severe RONI, Severe MR, Severe TR, Pulmonary hypertension present,  Two YUDITH devices are in stable position across A2-P2    Outpatient Cardiologists: Dr. Ruiz, Dr. Rojas (structural)    Home Meds: Eliquis 2.5mg BID, Torsemide 20mg qd, Toprol 25mg XL qd    Recommendations:    #Post Operative Cardiovascular Assessment  Uncomplicated post operative course. Patient is well appearing with no acute cardiovascular issues    #HFpEF (ACC Stage B, NYHA Class 2)  Etiology: likely 2/2 valvular disease, age, HTN  Diuretics: Resume Torsemide 20mg po qd     #pAfib  Likely atrial functional   -[id number]: 5  -C/w Toprol 25mg XL qd w/holding parameters SBP <100 and/or HR <60  -Resume Eliquis when surgically allowable given elevated stroke risk     #Valvular heart disease:  #Severe TR  #Severe MR s/p Yudith Mitral Clip  - stable with no acute issues, resume Toprol and Torsemide as above     Please reconsult prn     Upon d/c follow up with Dr. uRiz

## 2024-02-27 NOTE — PROGRESS NOTE ADULT - PROBLEM SELECTOR PLAN 6
"[facility] ED Handoff Report    ED Chief Complaint: Hematuria and back pain.     ED Diagnosis:  (N10) Pyelonephritis, acute  Comment:   Plan:     (N20.0) Staghorn calculus  Comment:   Plan:     (A41.9) Sepsis without acute organ dysfunction, due to unspecified organism (H)  Comment:   Plan:        PMH:    Past Medical History:   Diagnosis Date     Anemia      Diabetes (H)      Diabetes mellitus, type II (H)      H/O gestational diabetes mellitus, not currently pregnant 2013    Pt was started on insulin by HE DM educator on 10/3/13.  She is on NPH 12 units at bedtime and Humalog 1 unit per carb with meals.  She will NEED weekly BPP/NST until delivers at [facility]./[place]      H/O seasonal allergies      History of  2013    First c-sect was done in  in [place] and so she has unknown scar on uterus.  She had repeat c-sect  by Dr Pineda (Jasmin OBNIGEL) at [facility] and 13 at [facility] (had tubal ligation)      History of insulin dependent diabetes mellitus 2018        Code Status:  Full Code     Falls Risk: Yes Band: Not applicable    Current Living Situation/Residence: lives with their son or daughter     Elimination Status: Continent: Yes     Activity Level: Independent    Patients Preferred Language:  Other: Tabatha.     Needed: Yes    Vital Signs:  /71   Pulse 84   Temp 99.4  F (37.4  C) (Oral)   Resp (!) 31   Ht 1.575 m (5' 2\")   Wt 62.8 kg (138 lb 8 oz)   LMP 2022 (Exact Date)   SpO2 99%   BMI 25.33 kg/m       Cardiac Rhythm: NSR.    Pain Score: 1/10    Is the Patient Confused:  No    Last Food or Drink: 22 at 1400    Focused Assessment:  \"Patient arrives to triage from home with chief complaint of blood in urine, right sided back pain for the past two weeks.  Patient reports intermittent chest pain which started about two weeks ago as well.  Patient is alert and oriented x4.  Last dose of Tylenol around 0100.\":    Pyelo and blood cultures " positive. Pt being treated with IV Rocephin Q24 hrs. On K protocol, K was low and being replaced. 1100 draw was within normal limits. NS at 100 ml/hr infusing in 18G R AC. Pt is diabetic, checking blood glucose 3x a day with meals and on carb count.     Tests Performed: Done: Labs and Imaging    Family Dynamics/Concerns: No    Family Updated On Visitor Policy: No    Plan of Care Communicated to Family: Yes    Who Was Updated about Plan of Care: Daughter at bedside.     Belongings Checklist Done and Signed by Patient: No      Covid: asymptomatic , negative    Additional Information:     RN: Lillie Guidry RN    9/22/2022 2:09 PM      F: None   E: Replete as necessary K>4 Mg>2  N: soft and bite sized DASH diet  DVT Prophylaxis: restarted on heparin, consider restarting home eliquis when appropriate   GI prophylaxis: None   CODE STATUS: FULL CODE  Dispo: CRYSTAL Vitamin D, 25 Hydroxy 23.5. Calcium and PTH wnl   - Vitamin D repletion qd

## 2024-02-27 NOTE — PROGRESS NOTE ADULT - SUBJECTIVE AND OBJECTIVE BOX
Ortho Progress Note    Pt seen and examined at bedside  Denies CP, SOB, N/V    Vital Signs Last 24 Hrs  T(C): 36.3 (2024 00:43), Max: 36.4 (2024 00:11)  T(F): 97.4 (2024 00:43), Max: 97.5 (2024 00:11)  HR: 85 (2024 00:43) (72 - 88)  BP: 120/73 (2024 00:43) (109/76 - 134/83)  BP(mean): 104 (2024 23:45) (87 - 104)  RR: 18 (2024 00:43) (15 - 49)  SpO2: 94% (2024 00:43) (90% - 100%)    Parameters below as of 2024 00:43  Patient On (Oxygen Delivery Method): nasal cannula  O2 Flow (L/min): 2      Physical Exam:  General: Pt Alert and oriented, NAD  LLE:  DSG C/D/I, aquacellx2  Pulses: 2+ dp, pt pulses, wwp, cap refill <3 sec  Sensation: SILT in sural/saph/tibial/spn/dpn distributions  Motor: EHL/FHL/TA/GSC firing                   LABS:                          14.7   7.98  )-----------( 147      ( 2024 08:17 )             44.7     02-25    137  |  103  |  33<H>  ----------------------------<  75  4.0   |  21<L>  |  1.09    Ca    9.0      2024 08:17  Phos  3.6     02-25  Mg     2.2     02-25        PT/INR - ( 2024 22:00 )   PT: 13.4 sec;   INR: 1.18          PTT - ( 2024 22:00 )  PTT:33.3 sec  Urinalysis Basic - ( 2024 17:12 )    Color: Dark Yellow / Appearance: Clear / S.029 / pH: x  Gluc: x / Ketone: Trace mg/dL  / Bili: Negative / Urobili: 1.0 mg/dL   Blood: x / Protein: Trace mg/dL / Nitrite: Negative   Leuk Esterase: Trace / RBC: 2 /HPF / WBC 0 /HPF   Sq Epi: x / Non Sq Epi: 1 /HPF / Bacteria: Negative /HPF            A/P: 89yFemale s/p L HIP IMN with Dr. Zamorano on   - Stable  - Pain Control  - f/u AM labs  - PT, WBS: WBAT LLE  - Ok to start dvt ppx, choice per primary  - remainder of care per primary    Jesse Thurston, PGY2  Orthopaedic Surgery     Ortho Progress Note    Pt seen and examined at bedside  Denies CP, SOB, N/V    Vital Signs Last 24 Hrs  T(C): 36.3 (2024 00:43), Max: 36.4 (2024 00:11)  T(F): 97.4 (2024 00:43), Max: 97.5 (2024 00:11)  HR: 85 (2024 00:43) (72 - 88)  BP: 120/73 (2024 00:43) (109/76 - 134/83)  BP(mean): 104 (2024 23:45) (87 - 104)  RR: 18 (2024 00:43) (15 - 49)  SpO2: 94% (2024 00:43) (90% - 100%)    Parameters below as of 2024 00:43  Patient On (Oxygen Delivery Method): nasal cannula  O2 Flow (L/min): 2      Physical Exam:  General: Pt Alert and oriented, NAD  LLE:  DSG C/D/I, aquacellx2  Pulses: 2+ dp, pt pulses, wwp, cap refill <3 sec  Sensation: SILT in sural/saph/tibial/spn/dpn distributions  Motor: EHL/FHL/TA/GSC firing                   LABS:                          14.7   7.98  )-----------( 147      ( 2024 08:17 )             44.7     02-25    137  |  103  |  33<H>  ----------------------------<  75  4.0   |  21<L>  |  1.09    Ca    9.0      2024 08:17  Phos  3.6     02-25  Mg     2.2     02-25        PT/INR - ( 2024 22:00 )   PT: 13.4 sec;   INR: 1.18          PTT - ( 2024 22:00 )  PTT:33.3 sec  Urinalysis Basic - ( 2024 17:12 )    Color: Dark Yellow / Appearance: Clear / S.029 / pH: x  Gluc: x / Ketone: Trace mg/dL  / Bili: Negative / Urobili: 1.0 mg/dL   Blood: x / Protein: Trace mg/dL / Nitrite: Negative   Leuk Esterase: Trace / RBC: 2 /HPF / WBC 0 /HPF   Sq Epi: x / Non Sq Epi: 1 /HPF / Bacteria: Negative /HPF            A/P: 89yFemale s/p L HIP IMN with Dr. Zamorano on   - Stable  - Pain Control  - f/u AM labs  - PT, WBS: WBAT LLE  - DVT PPX: SCD, please start Lovenox 40 daily if no contraindications  - remainder of care per primary    Jesse Thurston, PGY2  Orthopaedic Surgery     Ortho Progress Note    Pt seen and examined at bedside  Denies CP, SOB, N/V    Vital Signs Last 24 Hrs  T(C): 36.3 (2024 00:43), Max: 36.4 (2024 00:11)  T(F): 97.4 (2024 00:43), Max: 97.5 (2024 00:11)  HR: 85 (2024 00:43) (72 - 88)  BP: 120/73 (2024 00:43) (109/76 - 134/83)  BP(mean): 104 (2024 23:45) (87 - 104)  RR: 18 (2024 00:43) (15 - 49)  SpO2: 94% (2024 00:43) (90% - 100%)    Parameters below as of 2024 00:43  Patient On (Oxygen Delivery Method): nasal cannula  O2 Flow (L/min): 2      Physical Exam:  General: Pt Alert and oriented, NAD  LLE:  DSG C/D/I, aquacellx2  Pulses: 2+ dp, pt pulses, wwp, cap refill <3 sec  Sensation: SILT in sural/saph/tibial/spn/dpn distributions  Motor: EHL/FHL/TA/GSC firing  Sacral decubitus ulcer present                    LABS:                          14.7   7.98  )-----------( 147      ( 2024 08:17 )             44.7     02-25    137  |  103  |  33<H>  ----------------------------<  75  4.0   |  21<L>  |  1.09    Ca    9.0      2024 08:17  Phos  3.6     02-25  Mg     2.2     02-25        PT/INR - ( 2024 22:00 )   PT: 13.4 sec;   INR: 1.18          PTT - ( 2024 22:00 )  PTT:33.3 sec  Urinalysis Basic - ( 2024 17:12 )    Color: Dark Yellow / Appearance: Clear / S.029 / pH: x  Gluc: x / Ketone: Trace mg/dL  / Bili: Negative / Urobili: 1.0 mg/dL   Blood: x / Protein: Trace mg/dL / Nitrite: Negative   Leuk Esterase: Trace / RBC: 2 /HPF / WBC 0 /HPF   Sq Epi: x / Non Sq Epi: 1 /HPF / Bacteria: Negative /HPF            A/P: 89yFemale s/p L HIP IMN with Dr. Zamorano on   - Stable  - Pain Control  - f/u AM labs  - PT, WBS: WBAT LLE  - DVT PPX: SCD, please start Lovenox 40 daily if no contraindications  - Please order BLE Dopplers  -  Rec Wound care for sacral decubitus ulcer  - remainder of care per primary    Jesse Thurston, PGY2  Orthopaedic Surgery

## 2024-02-27 NOTE — PROGRESS NOTE ADULT - SUBJECTIVE AND OBJECTIVE BOX
Cardiology Consult Service Progress Note     Allan Chavez MD TIFFANIE  Cardiology Fellow   Pager [phone]    Patient is a 89y old  Female who presents with a chief complaint of Fall, femoral fracture (2024 21:02)      SUBJECTIVE/OVERNIGHT EVENTS   No acute events overnight.      OBJECTIVE  Vital Signs Last 24 Hrs  T(C): 36.8 (2024 06:36), Max: 36.8 (2024 06:36)  T(F): 98.2 (2024 06:36), Max: 98.2 (2024 06:36)  HR: 88 (2024 07:43) (72 - 89)  BP: 107/69 (2024 07:43) (94/62 - 134/83)  BP(mean): 104 (2024 23:45) (87 - 104)  RR: 17 (2024 07:43) (15 - 49)  SpO2: 97% (2024 07:43) (90% - 100%)    Parameters below as of 2024 07:43  Patient On (Oxygen Delivery Method): nasal cannula  O2 Flow (L/min): 3      I&O's Summary    2024 07:01  -  2024 07:00  --------------------------------------------------------  IN: 50 mL / OUT: 0 mL / NET: 50 mL        PHYSICAL EXAM:  GENERAL: NAD, well-developed  HEAD:  Atraumatic, Normocephalic  EYES: EOMI, conjunctiva and sclera clear  NECK: Supple, No JVD  CHEST/LUNG: Clear to auscultation bilaterally; No wheeze  HEART: Regular rate and rhythm; No murmurs, rubs, or gallops  ABDOMEN: Soft, Nontender, Nondistended; Bowel sounds present  EXTREMITIES:  2+ Peripheral Pulses, No clubbing, cyanosis, or edema  PSYCH: AAOx3  NEUROLOGY: non-focal  SKIN: No rashes or lesions    LABS                        14.7   7.98  )-----------( 147      ( 2024 08:17 )             44.7     02    137  |  103  |  33<H>  ----------------------------<  75  4.0   |  21<L>  |  1.09    Ca    9.0      2024 08:17  Phos  3.6     02-25  Mg     2.2     0225      CAPILLARY BLOOD GLUCOSE        PT/INR - ( 2024 22:00 )   PT: 13.4 sec;   INR: 1.18          PTT - ( 2024 22:00 )  PTT:33.3 sec              Urinalysis Basic - ( 2024 17:12 )    Color: Dark Yellow / Appearance: Clear / S.029 / pH: x  Gluc: x / Ketone: Trace mg/dL  / Bili: Negative / Urobili: 1.0 mg/dL   Blood: x / Protein: Trace mg/dL / Nitrite: Negative   Leuk Esterase: Trace / RBC: 2 /HPF / WBC 0 /HPF   Sq Epi: x / Non Sq Epi: 1 /HPF / Bacteria: Negative /HPF        RADIOLOGY & ADDITIONAL TESTS:    MEDICATIONS  (STANDING):  acetaminophen     Tablet .. 1000 milliGRAM(s) Oral every 8 hours  clindamycin IVPB 900 milliGRAM(s) IV Intermittent every 8 hours  heparin   Injectable 5000 Unit(s) SubCutaneous every 8 hours  polyethylene glycol 3350 17 Gram(s) Oral at bedtime  senna 2 Tablet(s) Oral at bedtime    MEDICATIONS  (PRN):  HYDROmorphone  Injectable 0.5 milliGRAM(s) IV Push every 4 hours PRN breakthrough pain  magnesium hydroxide Suspension 30 milliLiter(s) Oral daily PRN Constipation  ondansetron Injectable 4 milliGRAM(s) IV Push every 6 hours PRN Nausea and/or Vomiting  traMADol 25 milliGRAM(s) Oral every 4 hours PRN Moderate Pain (4 - 6)  traMADol 50 milliGRAM(s) Oral every 4 hours PRN Severe Pain (7 - 10)     Cardiology Consult Service Progress Note     Allan Chavez MD TIFFANIE  Cardiology Fellow   Pager [phone]    Patient is a 89y old  Female who presents with a chief complaint of Fall, femoral fracture (24 Feb 2024 21:02)    SUBJECTIVE/OVERNIGHT EVENTS   S/p OR for Left hip arthroplasty   Denies chest pain, shortness of breath and lower extremity edema     OBJECTIVE  Vital Signs Last 24 Hrs  T(C): 36.8 (27 Feb 2024 06:36), Max: 36.8 (27 Feb 2024 06:36)  T(F): 98.2 (27 Feb 2024 06:36), Max: 98.2 (27 Feb 2024 06:36)  HR: 88 (27 Feb 2024 07:43) (72 - 89)  BP: 107/69 (27 Feb 2024 07:43) (94/62 - 134/83)  BP(mean): 104 (26 Feb 2024 23:45) (87 - 104)  RR: 17 (27 Feb 2024 07:43) (15 - 49)  SpO2: 97% (27 Feb 2024 07:43) (90% - 100%)    Parameters below as of 27 Feb 2024 07:43  Patient On (Oxygen Delivery Method): nasal cannula  O2 Flow (L/min): 3    I&O's Summary    26 Feb 2024 07:01  -  27 Feb 2024 07:00  --------------------------------------------------------  IN: 50 mL / OUT: 0 mL / NET: 50 mL    PHYSICAL EXAM:  GENERAL: NAD  HEAD:  Atraumatic, Normocephalic  EYES: EOMI, conjunctiva and sclera clear  NECK: Supple, No JVD  CHEST/LUNG: Clear to auscultation bilaterally; No wheeze  HEART: Regular rate and rhythm; +Systolic murmur   ABDOMEN: Soft, Nontender, Nondistended; Bowel sounds present  EXTREMITIES:  2+ Peripheral Pulses, No lower extremity edema  PSYCH: AAOx3  NEUROLOGY: non-focal  SKIN: No rashes or lesions    LABS                        14.7   7.98  )-----------( 147      ( 25 Feb 2024 08:17 )             44.7     02-25    137  |  103  |  33<H>  ----------------------------<  75  4.0   |  21<L>  |  1.09    Ca    9.0      25 Feb 2024 08:17  Phos  3.6     02-25  Mg     2.2     02-25    PT/INR - ( 25 Feb 2024 22:00 )   PT: 13.4 sec;   INR: 1.18     PTT - ( 25 Feb 2024 22:00 )  PTT:33.3 sec    MEDICATIONS  (STANDING):  acetaminophen     Tablet .. 1000 milliGRAM(s) Oral every 8 hours  clindamycin IVPB 900 milliGRAM(s) IV Intermittent every 8 hours  heparin   Injectable 5000 Unit(s) SubCutaneous every 8 hours  polyethylene glycol 3350 17 Gram(s) Oral at bedtime  senna 2 Tablet(s) Oral at bedtime    MEDICATIONS  (PRN):  HYDROmorphone  Injectable 0.5 milliGRAM(s) IV Push every 4 hours PRN breakthrough pain  magnesium hydroxide Suspension 30 milliLiter(s) Oral daily PRN Constipation  ondansetron Injectable 4 milliGRAM(s) IV Push every 6 hours PRN Nausea and/or Vomiting  traMADol 25 milliGRAM(s) Oral every 4 hours PRN Moderate Pain (4 - 6)  traMADol 50 milliGRAM(s) Oral every 4 hours PRN Severe Pain (7 - 10)

## 2024-02-28 NOTE — PROGRESS NOTE ADULT - NS ATTEST RISK PROBLEM GEN_ALL_CORE FT
Femur fracture  Afib  MVR  HTN  hx of GI bleed
Femur fracture  Post op state  HFpEF  Severe MR  Afib
Femur fracture  Post op state  HFpEF  Severe MR  Afib

## 2024-02-28 NOTE — PROGRESS NOTE ADULT - ATTENDING COMMENTS
patient now amenable to MRI , possible surgery if indicated, [facility]   holding eliquis given possible surgery   [  ] resume torsemide at 10mg qd (lower dose) when YASH resolves
Patient is a 89 yo Female with Pmhx of MVP with Flail posterior leaflet ( P2 and P3 scallops) and Severe MR, s/p transcatheter MV repair with Montano Yudith Clasp x 2 in 07/2019, Severe TR, pAfib on Eliquis, HFpEF, HTN, GIB, BIBEMS after sustaining mechanical fall, found to have  acute nondisplaced intertrochanteric fracture at the left proximal femur s/p surgical correction. Cardiology originally consulted for perioperative Cardiovascular risk assessment for left femur IMN on 2/26/24.    Review of Studies:  - ECG 2/20/24: Afib w/IRBBB and non-specific ST&T wave changes  - ECG 2/25/24: Afib w/RBBB, PVCs, motion artifact   - TTE 6/23': Mildly dilated left ventricular size, Normal left ventricular systolic function. Mildly dilated right ventricular size. Mildly reduced right ventricular systolic fx, Severe RONI, Severe MR, Severe TR, Pulmonary hypertension present, Two YUDITH devices are in stable position across A2-P2    Outpatient Cardiologists: Dr. Ruiz, Dr. Rojas (structural)    Home Meds: Eliquis 2.5mg BID, Torsemide 20mg qd, Toprol 25mg XL qd    #Post-operative Cardiovascular Risk Assessment  - Patient with known HFpEF, mildly Reduced RV function and severe Valvular heart disease, s/p Successful  Left Femur IMN  - Clinically she is warm, well compensated and well perfused with JVP to the clavicle, no pulmonary or lower extremity edema  - Last Echo performed in June of last year revealed Presence of Yudith Clasps x2 with residual severe MR as well as severe TR  - Patient tolerated the procedure well without complications  - At this time would cont with Toprol 25mg XL qd perioperatively  - Please resume home Torsemide 20 mg po daily to maintain euvolemia  - Please be judicious with fluid resuscitation given severe valvular disease    # Persistent Afib  - Patient with kown Persistent Afib currently rate controlled   - [id number] of at least  5 placing patient at high thromboembolic risk  -C/w Toprol 25mg XL qd  w/holding parameters SBP <110 and/or HR <60  - Cont with Eliquis 2.5 mg po BID (55 Kilos and 89 yo)    Please ensure patient has f/u with Dr. Ruiz and Crystal within 2-4 weeks of d/c  Please reconsult Cardiology as needed    Time-based billing (NON-critical care).     55 minutes spent on total encounter. The necessity of the time spent during
Pt is 89 yo woman with good functional status otherwise, admitted after a mechanical fall and found to have left trochanter fracture. Pt is planned for ORIF procedure today. Cardiac perioperative assessment is done given of MVR procedure.   Pt is well optimized for surgery.
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      MRI showing intertrochanteric fracture. Planned for IMN tomorrow.   pre-op cardiac risk assessment per cards consult
89yo F w/ PMH HTN, GIB, paroxysmal afib (on eliquis), HFpEF, severe MR s/p transcatheter mitral valve repair using Saulo on 07/3/19 (Clasp IID research trial), severe TR, LBP/OA, gingivitis, BIBEMS for mechanical fall that occurred after she bumped into another tenant in her building, found to have nondisplaced L greater trochanter proximal femur    - plan for MRI to determine extent of fracture and need for OR, hold Eliquis pending MRI.  If no need for OR will resume AC, if still pending MRI will discuss with cardiology the need for AC with hep gtt given Afib and Saulo MVR given high CHADsVASC of 5.  CrCl too low for full dose Lovenox  - PT/OT eval rec [facility]  - pain control with lidocaine patch, oxycodone, tylenol. Avoiding NSAIDs giving hx of GIB bleed and full AC.  Trial of gabapentin given neuropathic pain radiating down LLE  - remainder as above
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      Patient adamant about not wanting an MRI.   Discussed indication for MRI with patient. High expected one year mortality with non-repair if hip fracture truly present.   Patient has good insight, understands reason for recommendation, but does not wish to pursue MRI , and says that she would not want to proceed with surgery even if MRI showed that surgery would be indicated.   Patient has a strong preference for non-operative conservative management ----MRI would not .   Agree with ortho recs for TTWB LLE and outpt f/u given that patient is refusing MRI.     [ ] Resumed eliquis 2.5mg BID   [ ] f/u AM BMP     Patient amenable to [facility] after discussing with niece and with SW.
89yo F w/ PMH HTN, GIB, paroxysmal afib (on eliquis), HFpEF, severe MR s/p transcatheter mitral valve repair using Yudith on 07/3/19 (Clasp IID research trial), severe TR, LBP/OA, gingivitis, BIBEMS for mechanical fall that occurred after she bumped into another tenant in her building, found to have nondisplaced L greater trochanter proximal femur    Patient more alert today, pain well controlled.  Weaned to RA    #L greater trochanter fracture  - POD 2  - PT/OT eval rec [facility]  - pain control with lidocaine patch, oxycodone, tylenol. Avoiding NSAIDs giving hx of GIB bleed and full AC.    - decreased dose of oxy back to 2.5 and discontinued tramadol  - resumed AC with Eliquis  - start Vit D supplementation    #HFpEF  #Severe MR s/p YUDITH repair  - cardiology following, appreciate recs  - c/w home Toprol with hold parameters  - resumed home torsemide 10mg with hold parameters today, patient tolerating more PO intake    #Afib, chronic  - resume Toprol and AC as above    Remainder as above  Dispo: Medically ready for DC
89yo F w/ PMH HTN, GIB, paroxysmal afib (on eliquis), HFpEF, severe MR s/p transcatheter mitral valve repair using Yudith on 07/3/19 (Clasp IID research trial), severe TR, LBP/OA, gingivitis, BIBEMS for mechanical fall that occurred after she bumped into another tenant in her building, found to have nondisplaced L greater trochanter proximal femur    Patient somnolent but awakes to touch and voice.  HDS but requiring some O2 this AM    #L greater trochanter fracture  - POD 1  - PT/OT eval rec [facility]  - pain control with lidocaine patch, oxycodone, tylenol. Avoiding NSAIDs giving hx of GIB bleed and full AC.    - decreased dose of oxy due to oversedation, close monitoring  - will resume AC with Eliquis if ok with ortho today for DVT ppx  - start Vit D supplementation    #HFpEF  #Severe MR s/p YUDITH repair  - cardiology following, appreciate recs  - will resume home Toprol with hold parameters  - resume home torsemide 10mg with hold parameters    #Afib, chronic  - resume Toprol and AC as above    Remainder as above  Dispo: likely medically ready tomorrow pending pain control

## 2024-02-28 NOTE — PROGRESS NOTE ADULT - SUBJECTIVE AND OBJECTIVE BOX
INTERVAL HPI/OVERNIGHT EVENTS:  Patient was seen and examined at bedside. As per nurse and patient, no o/n events, patient resting comfortably. No complaints at this time. ROS (-)   VITAL SIGNS:  T(F): 97.6 (02-28-24 @ 08:54)  HR: 87 (02-28-24 @ 08:54)  BP: 124/78 (02-28-24 @ 08:54)  RR: 18 (02-28-24 @ 08:54)  SpO2: 98% (02-28-24 @ 08:54)  Wt(kg): --      02-27-24 @ 07:01  -  02-28-24 @ 07:00  --------------------------------------------------------  IN: 0 mL / OUT: 600 mL / NET: -600 mL        PHYSICAL EXAM:    GENERAL: NAD, lying in bed comfortably, hoarse voice   NECK: Supple, trachea midline, no JVD  HEART: Regular rate and rhythm, no murmurs, rubs, or gallops  LUNGS: Unlabored respirations.  Clear to auscultation bilaterally, no crackles, wheezing, or rhonchi  ABDOMEN: Soft, nontender, nondistended, +BS  EXTREMITIES: 2+ peripheral pulses bilaterally. No clubbing, cyanosis, or edema. LLE pain with movement   NERVOUS SYSTEM:  A&Ox1, moving all extremities, no focal deficits   SKIN: No rashes or lesions      MEDICATIONS  (STANDING):  acetaminophen     Tablet .. 975 milliGRAM(s) Oral every 6 hours  apixaban 2.5 milliGRAM(s) Oral every 12 hours  cholecalciferol 800 Unit(s) Oral daily  metoprolol succinate ER 25 milliGRAM(s) Oral every 24 hours  polyethylene glycol 3350 17 Gram(s) Oral at bedtime  senna 2 Tablet(s) Oral at bedtime  torsemide 10 milliGRAM(s) Oral daily    MEDICATIONS  (PRN):  magnesium hydroxide Suspension 30 milliLiter(s) Oral daily PRN Constipation  ondansetron Injectable 4 milliGRAM(s) IV Push every 6 hours PRN Nausea and/or Vomiting  oxyCODONE    IR 2.5 milliGRAM(s) Oral every 6 hours PRN Severe Pain (7 - 10)      Allergies    penicillin (Hives; Blisters)    Intolerances        LABS:                        13.9   4.84  )-----------( 165      ( 28 Feb 2024 05:30 )             42.4     02-28    137  |  102  |  26<H>  ----------------------------<  94  4.3   |  24  |  1.07    Ca    8.7      28 Feb 2024 05:30  Phos  2.9     02-28  Mg     2.0     02-28    TPro  6.0  /  Alb  3.3  /  TBili  0.9  /  DBili  x   /  AST  35  /  ALT  21  /  AlkPhos  120  02-28      Urinalysis Basic - ( 28 Feb 2024 05:30 )    Color: x / Appearance: x / SG: x / pH: x  Gluc: 94 mg/dL / Ketone: x  / Bili: x / Urobili: x   Blood: x / Protein: x / Nitrite: x   Leuk Esterase: x / RBC: x / WBC x   Sq Epi: x / Non Sq Epi: x / Bacteria: x        RADIOLOGY & ADDITIONAL TESTS:  Reviewed

## 2024-02-28 NOTE — DISCHARGE NOTE NURSING/CASE MANAGEMENT/SOCIAL WORK - PATIENT PORTAL LINK FT
You can access the FollowMyHealth Patient Portal offered by [facility] by registering at the following website: [email or web address]. By joining [facility]’s FollowMyHealth portal, you will also be able to view your health information using other applications (apps) compatible with our system.

## 2024-02-28 NOTE — PROGRESS NOTE ADULT - PROBLEM SELECTOR PLAN 2
Hx of HTN. Home meds: torsemide 20mg qd. BP 130s-150s/70s-90s since admission.  - c/w torsemide 20mg PO qd
Hx of HTN. Home meds: torsemide 20mg qd. BP 130s-150s/70s-90s since admission.  - c/w torsemide 10mg po qd (decreased from 20)
Hx of HTN. Home meds: torsemide 20mg qd. BP 130s-150s/70s-90s since admission.  - f/u afternoon BP consider restarting torsemide 10mg po qd (decreased from 20)
Hx of HTN. Home meds: torsemide 20mg qd. BP 130s-150s/70s-90s since admission.  - c/w torsemide 10mg po qd (decreased from 20)
Hx of HTN. Home meds: torsemide 20mg qd. BP 130s-150s/70s-90s since admission.  -  restart torsemide 10mg po qd (
Hx of HTN. Home meds: torsemide 20mg qd. BP 130s-150s/70s-90s since admission.  - c/w torsemide 20mg PO qd
Hx of HTN. Home meds: torsemide 20mg qd. BP 130s-150s/70s-90s since admission.  - holding torsemide given YASH   - Per outpatient notes, unclear if patient takes torsemide at 20 mg daily.   - torsemide had been increased from 10mg qd to 20mg qd in December 2023.     [  ] resume torsemide at 10mg qd (lower dose) when YASH resolves

## 2024-02-28 NOTE — PROGRESS NOTE ADULT - PROBLEM SELECTOR PLAN 5
TTE 06/2023 EF 55-60%, mildly dilated LV, mildly dilated RV, mildly reduced RV systolic function, severe biatrial enlargement, severe MR, severe TR, pulmonary HTN, two YUDITH devices in stable position across A2-P2 (unchanged singe 06/2023). Pt underwent percutaneous transcatheter repair of mitral valve using multiple leaflet clips in 07/2019 w/ Dr. Rojas. Home meds: torsemide 20mg qd. Follows w/ Dr. Ruiz and Dr. Rojas. Torsemide was recently increased from 10mg to 20mg and ASA was recently discontinued - both managed by Dr. Ruiz, who was also considering starting spironolactone 12.5mg on next visit. Unclear if pt was compliant w/ torsemide daily as she still had BLE edema, none at this time.  - torsemide 10mg PO qd held post op, consider restarting when appropriate       ProBNP Trend from [facility]    2.24.24 (This admission) --- 4699  9.22.23                          --- 970  5.16.23                          --- 793  4.18.23                          --- 752    Cardiology for pre-op eval to help with volume optimization if planning for surgery   12.22.22                        --- 933

## 2024-02-28 NOTE — PROGRESS NOTE ADULT - SUBJECTIVE AND OBJECTIVE BOX
Ortho Progress Note    Pt seen and examined at bedside  Denies CP, SOB, N/V    Vital Signs Last 24 Hrs  T(C): 37.3 (27 Feb 2024 23:26), Max: 37.3 (27 Feb 2024 23:26)  T(F): 99.1 (27 Feb 2024 23:26), Max: 99.1 (27 Feb 2024 23:26)  HR: 80 (27 Feb 2024 23:26) (80 - 93)  BP: 98/72 (27 Feb 2024 23:26) (94/62 - 108/71)  BP(mean): --  RR: 18 (27 Feb 2024 23:26) (16 - 19)  SpO2: 96% (27 Feb 2024 23:26) (91% - 98%)    Parameters below as of 27 Feb 2024 23:26  Patient On (Oxygen Delivery Method): room air          Physical Exam:  General: Pt Alert and oriented, NAD  LLE:  DSG C/D/I, aquacellx2  Pulses: 2+ dp, pt pulses, wwp, cap refill <3 sec  Sensation: SILT in sural/saph/tibial/spn/dpn distributions  Motor: EHL/FHL/TA/GSC firing  Sacral decubitus ulcer present            LABS:                          13.9   6.74  )-----------( 150      ( 27 Feb 2024 05:30 )             42.6     02-27    135  |  102  |  22  ----------------------------<  132<H>  4.0   |  20<L>  |  0.97    Ca    8.9      27 Feb 2024 05:30  Phos  3.3     02-27  Mg     2.0     02-27    TPro  5.5<L>  /  Alb  2.8<L>  /  TBili  1.0  /  DBili  x   /  AST  36  /  ALT  20  /  AlkPhos  113  02-27    LIVER FUNCTIONS - ( 27 Feb 2024 05:30 )  Alb: 2.8 g/dL / Pro: 5.5 g/dL / ALK PHOS: 113 U/L / ALT: 20 U/L / AST: 36 U/L / GGT: x             Urinalysis Basic - ( 27 Feb 2024 05:30 )    Color: x / Appearance: x / SG: x / pH: x  Gluc: 132 mg/dL / Ketone: x  / Bili: x / Urobili: x   Blood: x / Protein: x / Nitrite: x   Leuk Esterase: x / RBC: x / WBC x   Sq Epi: x / Non Sq Epi: x / Bacteria: x            A/P: 89yFemale s/p L HIP IMN with Dr. Zamorano on 2/26  - Stable  - Pain Control  - f/u AM labs  - PT, WBS: WBAT LLE  - DVT PPX: scd, eliquis per primary  - Doppler negative  -  Rec Wound care for sacral decubitus ulcer  - remainder of care per primary    Jesse Thurston, PGY2  Orthopaedic Surgery

## 2024-02-28 NOTE — PROGRESS NOTE ADULT - PROBLEM SELECTOR PLAN 4
Pt reports hx of "massive GI bleed" ~3 years ago at [place], per pt she had EGD vs colonoscopy but was not given a diagnosis/source of the bleeding. No recent prescriptions in Surescripts for protonix, however pt was on it during her last admission in 2019.  - avoid NSAIDs
Pt reports hx of "massive GI bleed" ~3 years ago at [place], per pt she had EGD vs colonoscopy but was not given a diagnosis/source of the bleeding. No recent prescriptions in Surescripts for protonix, however pt was on it during her last admission in 2019.  - avoid NSAIDs
Pt reports hx of "massive GI bleed" ~3 years ago at [place], per pt she had EGD vs colonoscopy but was not given a diagnosis/source of the bleeding. No recent prescriptions in Surescripts for protonix, however pt was on it during her last admission in 2019.  - avoid NSAIDs
Pt reports hx of "massive GI bleed" ~3 years ago at [place], per pt she had EGD vs colonoscopy but was not given a diagnosis/source of the bleeding. No recent prescriptions in Surescripts for protonix, however pt was on it during her last admission in 2019.  - avoid NSAIDs
Pt reports hx of "massive GI bleed" ~3 years ago at [place], per pt she had EGD vs colonoscopy but was not given a diagnosis/source of the bleeding. No recent prescriptions in Surescripts for protonix, however pt was on it during her last admission in 2019.  - avoid NSAIDs
Pt reports hx of "massive GI bleed" ~3 years ago at [place], per pt she had EGD vs colonoscopy but was not given a diagnosis/source of the bleeding. No recent prescriptions in Surescripts for protonix, however pt was on it during her last admission in 2019.  - avoid NSAIDs
Pt reports hx of "massive GI bleed" ~3 years ago at [place], per pt she had EGD vs colonoscopy but was not given a diagnosis/source of the bleeding. No recent prescriptions in Surescripts for protonix, however pt was on it during her last admission in 2019.  - avoid NSAIDs

## 2024-02-28 NOTE — PROGRESS NOTE ADULT - PROBLEM SELECTOR PLAN 3
Hx of paroxysmal afib. Home meds: eliquis 2.5mg BID, toprol 25mg qd. Follows w/ Dr. Ruiz. EKG on admission HR 75, afib with incomplete RBBB, QTc 469 however there is motion artifact.  -  restarted eliquis 2.5mg PO,   - restarted   toprol 25mg PO qd

## 2024-02-28 NOTE — PROGRESS NOTE ADULT - PROBLEM SELECTOR PROBLEM 1
Femur fracture, left

## 2024-02-28 NOTE — PROGRESS NOTE ADULT - PROBLEM SELECTOR PLAN 1
Pt presenting for mechanical fall after she bumped into another tenant in her building. CT head: no acute intracranial hemorrhage or fracture; superior ophthalmic veins (R>L) increased in size since 2/1/23 (nonspecific), no hyperdense regions to suggest thrombosis. 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 CT L femur: nondisplaced fracture greater trochanter proximal L femur. Seen by ortho in ED. S/p 1L NS in ED.  MR hip indicating need for surgical intervention   S/p fixation with ortho'  - PT recs  - pain control - tylenol 650mg PO q6h PRN, add PRN oxy 2.5 if needed -- avoid NSAIDs given h/o GIB

## 2024-02-28 NOTE — PROGRESS NOTE ADULT - PROVIDER SPECIALTY LIST ADULT
Internal Medicine
Orthopedics
Orthopedics
Cardiology
Internal Medicine
Orthopedics
Rehab Medicine
Orthopedics
Rehab Medicine
Internal Medicine
Hospitalist
Internal Medicine

## 2024-02-28 NOTE — PROGRESS NOTE ADULT - NUTRITIONAL ASSESSMENT
This patient has been assessed with a concern for Malnutrition and has been determined to have a diagnosis/diagnoses of Severe protein-calorie malnutrition.    This patient is being managed with:   Diet Regular-  Entered: Feb 26 2024  7:09PM  
This patient has been assessed with a concern for Malnutrition and has been determined to have a diagnosis/diagnoses of Severe protein-calorie malnutrition.    This patient is being managed with:   Diet Soft and Bite Sized-  Entered: Feb 21 2024  6:43AM  
This patient has been assessed with a concern for Malnutrition and has been determined to have a diagnosis/diagnoses of Severe protein-calorie malnutrition.    This patient is being managed with:   Diet NPO after Midnight-     NPO Start Date: 25-Feb-2024   NPO Start Time: 23:59  Except Medications  Entered: Feb 25 2024  8:47PM    Diet Soft and Bite Sized-  Entered: Feb 21 2024  6:43AM  
This patient has been assessed with a concern for Malnutrition and has been determined to have a diagnosis/diagnoses of Severe protein-calorie malnutrition.    This patient is being managed with:   Diet Soft and Bite Sized-  Entered: Feb 21 2024  6:43AM  
This patient has been assessed with a concern for Malnutrition and has been determined to have a diagnosis/diagnoses of Severe protein-calorie malnutrition.    This patient is being managed with:   Diet Soft and Bite Sized-  Entered: Feb 21 2024  6:43AM  
This patient has been assessed with a concern for Malnutrition and has been determined to have a diagnosis/diagnoses of Severe protein-calorie malnutrition.    This patient is being managed with:   Diet Regular-  Entered: Feb 26 2024  7:09PM  
This patient has been assessed with a concern for Malnutrition and has been determined to have a diagnosis/diagnoses of Severe protein-calorie malnutrition.    This patient is being managed with:   Diet Soft and Bite Sized-  Entered: Feb 21 2024  6:43AM  
This patient has been assessed with a concern for Malnutrition and has been determined to have a diagnosis/diagnoses of Severe protein-calorie malnutrition.    This patient is being managed with:   Diet Regular-  Entered: Feb 26 2024  7:09PM  
This patient has been assessed with a concern for Malnutrition and has been determined to have a diagnosis/diagnoses of Severe protein-calorie malnutrition.    This patient is being managed with:   Diet NPO after Midnight-     NPO Start Date: 25-Feb-2024   NPO Start Time: 23:59  Except Medications  Entered: Feb 25 2024  8:47PM    Diet Soft and Bite Sized-  Entered: Feb 21 2024  6:43AM  
This patient has been assessed with a concern for Malnutrition and has been determined to have a diagnosis/diagnoses of Severe protein-calorie malnutrition.    This patient is being managed with:   Diet Soft and Bite Sized-  Entered: Feb 21 2024  6:43AM  
This patient has been assessed with a concern for Malnutrition and has been determined to have a diagnosis/diagnoses of Severe protein-calorie malnutrition.    This patient is being managed with:   Diet Soft and Bite Sized-  Entered: Feb 21 2024  6:43AM

## 2024-02-28 NOTE — PROGRESS NOTE ADULT - PROBLEM SELECTOR PLAN 7
F: None   E: Replete as necessary K>4 Mg>2  N: soft and bite sized DASH diet  DVT Prophylaxis: restarted on heparin, consider restarting home eliquis when appropriate   GI prophylaxis: None   CODE STATUS: FULL CODE  Dispo: [facility]
F: None   E: Replete as necessary K>4 Mg>2  N: soft and bite sized DASH diet  DVT Prophylaxis: restarted on heparin, consider restarting home eliquis when appropriate   GI prophylaxis: None   CODE STATUS: FULL CODE  Dispo: [facility]

## 2024-02-29 NOTE — H&P ADULT - PROBLEM SELECTOR PLAN 2
- F/up TTE  - Consider SHD consult in AM if residual/worsening MR   - TTE (6/06/23): EF 58%. Mildly dilated RV with mildly reduced fxn. Severe BIAE. Severe MR (MG 4.7mmHg), YUDITH devices in stable position across A2-P2; severe TR

## 2024-02-29 NOTE — ED PROVIDER NOTE - CLINICAL SUMMARY MEDICAL DECISION MAKING FREE TEXT BOX
89F PMH HTN, prior GIB, paroxysmal afib (on eliquis), HFpEF, severe MR s/p transcatheter mitral valve repair using Saulo on 07/3/19, severe TR, LBP/OA, recent fall w/ nondisplaced L greater trochanter proximal femur s/p ORIF and just dc'd to [facility] yesterday, now p/w several complaints. Pt is very pleasant but not able to answer all questions. C/o L hip pain. Denies pain elsewhere. Niece at bedside: States that pt was c/o pain to L hip surgical site and so she wanted eval to make sure its ok. Pt was also c/o vague CP. Pt also w/ cough since her surgery and possibly looks like she is breathing heavier than usual. Also concerned about bed sore that pt had developed.   Labs sent and resulted prior to my eval: Hgb increased to 16.5, alk phos 150, AST 79, trop 116, other labs grossly wnl. Recent duplex w/ no DVT.   Vitals wnl, exam as above.   Has sacral wound - clinically not infected.   Hip: Clinically very low suspicion infection, likely normal post-op pain.   Vague CP/elevated trop. Low suspicion ACS/PE.   Given recent surgery will CTA, add on BNP. Tylenol. Will reassess.

## 2024-02-29 NOTE — H&P ADULT - NSHPLABSRESULTS_GEN_ALL_CORE
16.5   6.27  )-----------( 200      ( 29 Feb 2024 16:32 )             49.1       02-29    137  |  101  |  26<H>  ----------------------------<  115<H>  4.1   |  24  |  1.10    Ca    9.4      29 Feb 2024 22:30  Phos  2.9     02-28  Mg     2.1     02-29    TPro  5.9<L>  /  Alb  3.0<L>  /  TBili  0.8  /  DBili  x   /  AST  62<H>  /  ALT  30  /  AlkPhos  117  02-29    PT/INR - ( 29 Feb 2024 16:32 )   PT: 15.8 sec;   INR: 1.40          PTT - ( 29 Feb 2024 16:32 )  PTT:34.0 sec    Procal 0.26  BNP 9941  hsTrop T 116 --> 111

## 2024-02-29 NOTE — ED PROVIDER NOTE - PROGRESS NOTE DETAILS
[place]lepfish: BNP 9941, CT showed "Evaluation of the bibasilar subsegmental pulmonary arteries are limited   due to suboptimal opacification, likely due to underlying cardiomyopathy. Within these limitations, there is no evidence of pulmonary embolism. There are multifocal patchy opacities primarily within the upper lobes and medial left lower lobe, suspicious for multifocal pneumonia. Small right pleural effusion.  The heart is markedly enlarged, particularly the right atrium and ventricle, and there is reflux of contrast into the hepatic veins, likely due to underlying cardiomyopathy."  Given CT findings/age/cough, will tx for multifocal PNA. Also possible aspect of fluid overload. Rpt trop/EKG pending, low suspicion ACS. d/w Dr. Peñaloza, recommending possible cards admit. Paged cards fellow. Will reassess. Pt remains stable. [place]pfish: rpt eKG improved rate, no morphologic changes. Rpt trop pending. Evaluated by cards, will admit for further care.

## 2024-02-29 NOTE — H&P ADULT - PROBLEM SELECTOR PLAN 4
- Meds as above     - Fluids: not indicated  - Replete Lytes PRN to K>4, Mg>2  - Diet: DASH   - DVT ppx: Eliquis   - SW/Dispo: Medically active

## 2024-02-29 NOTE — H&P ADULT - ASSESSMENT
89F with hx of HTN, pAfib (on Eliquis), HFpEF (EF 55-60%, 6/2023), severe MR s/p Saulo transcathter MV repair ([facility], 7/03/19), OA, and recent hospitalization 2/21-2/28/24 for mechanical fall with course c/b non-displaced Lt great trochanter proximal femur fx s/p ORIF and was discharged to [facility], now presenting with SOB.  89F with hx of HTN, pAfib (on Eliquis), HFpEF (EF 55-60%, 6/2023), severe MR s/p Saulo transcathter MV repair ([facility], 7/03/19), OA, and recent hospitalization 2/21-2/28/24 for mechanical fall with course c/b non-displaced Lt great trochanter proximal femur fx s/p ORIF and was discharged to [facility], now presenting with SOB and worsening cough c/f volume overload 2/2 possible residual/worsening MR vs. PNA.

## 2024-02-29 NOTE — H&P ADULT - PROBLEM SELECTOR PLAN 1
- Diuresis: Lasix 20mg IV x1 (Home: Torsemide 10mg QD)   - GDMT: Metoprolol XL 25mg QD      o Can consider Spironolactone 25mg QD for further GDMT optimization

## 2024-02-29 NOTE — H&P ADULT - NSICDXPASTSURGICALHX_GEN_ALL_CORE_FT
PAST SURGICAL HISTORY:  H/O exploratory laparotomy     H/O total hysterectomy     History of open reduction and internal fixation (ORIF) procedure

## 2024-02-29 NOTE — H&P ADULT - HISTORY OF PRESENT ILLNESS
89F with hx of HTN, pAfib (on Eliquis), HFpEF (EF 55-60%, 6/2023), severe MR s/p Saulo transcathter MV repair ([facility], 7/03/19), OA, and recent hospitalization 2/21-2/28/24 for mechanical fall with course c/b non-displaced Lt great trochanter proximal femur fx s/p ORIF and was discharged to [facility], now presenting with SOB.     Per niece Mariealena, while pt was at [facility], pt was complaining of both CP and SOB as well as worsening cough. Currently, pt denies any fatigue, weakness, headache, dizziness/lightheadedness, CP, palpitations, SOB, FELIZ, orthopnea/PND, LE edema, N/V, abdominal discomfort, melena, BRBPR, hematemesis, hematuria, or recent sick contacts/travel.    While in ED, VSS. Labs on admission notable for elevated Procal 0.26 with Chest CTA noting multifocal upper lobes and medial LLL patchy opacification c/f multifocal PNA and small Rt pleural effusion; though pt is afebrile, no leukocytosis, and negative RVP. Pt also noted to have elevated hsTrop T peaking at 116 and elevated BNP 9941; EKG with Sinus with frequent PACs and RBBB (unchanged from prior). Pt received IV Vanc x1, IV CTX x1, and Lasix 20mg IVx1. Pt was then admitted to cardiology service for further monitoring and management.

## 2024-02-29 NOTE — ED PROVIDER NOTE - UNABLE TO OBTAIN
- Hgb/hct 2 weeks ago stable at 11.2/38.2  - Per op note -- EBL was 20 mL  - Obtain CBC in AM to assess post op       Dementia

## 2024-02-29 NOTE — ED ADULT NURSE NOTE - NSFALLHARMRISKINTERV_ED_ALL_ED

## 2024-02-29 NOTE — ED ADULT NURSE NOTE - OBJECTIVE STATEMENT
pt received into spot 5 biba from nursing home for eval of chest pain this afternoon that has since resolved pt currently denies CP but reports dry cough and hip pain at surgical site as well as sacral area where she has a decub ulcer that is painful. Decub is stage 2 small in folds at the top of buttocks dressed with cushion dressing no drainage noted. Left hip surgical area is well appearing no redness or swelling noted pt respirations unlabored abd nondistended no le swelling. 12 lead EKG done pt placed on ccm IV placed labs sent

## 2024-02-29 NOTE — ED ADULT TRIAGE NOTE - CHIEF COMPLAINT QUOTE
Per niece, patient c/o chest pain since earlier today and pain to surgical site. Reports patient had surgery on left femur on Monday. Niece also reports patient has worsening cough

## 2024-02-29 NOTE — H&P ADULT - NSICDXFAMILYHX_GEN_ALL_CORE_FT
What Type Of Note Output Would You Prefer (Optional)?: Bullet Format How Severe Are Your Spot(S)?: moderate Have Your Spot(S) Been Treated In The Past?: has not been treated Hpi Title: Evaluation of Skin Lesions FAMILY HISTORY:  No pertinent family history in first degree relatives

## 2024-02-29 NOTE — ED PROVIDER NOTE - CARE PLAN
Principal Discharge DX:	Multifocal pneumonia   1 Principal Discharge DX:	Multifocal pneumonia  Secondary Diagnosis:	Fluid overload  Secondary Diagnosis:	Elevated troponin

## 2024-02-29 NOTE — H&P ADULT - NS ATTEND AMEND GEN_ALL_CORE FT
89F with hx of HTN, pAfib (on Eliquis), HFpEF (EF 55-60%, 6/2023), severe MR s/p Saulo transcathter MV repair ([facility], 7/03/19), OA, and recent hospitalization 2/21-2/28/24 for mechanical fall with course c/b non-displaced Lt great trochanter proximal femur fx s/p ORIF and was discharged to [facility], now presenting with SOB.     1. Multifocal PNA  Likely hospital acquired, started on ceftriaxone and vanco, will swab, if negative for mrsa, stop vanco. F/u procalcitonin. May need to expand abx, but wbc ok, afebrile. Allergic to penicillin.    2. HF  Probably some component of HF, JVP elevated with pronounced V waves, minimal edema. Echo in AM, parenteral diuresis    3. NSTEMI  Likely type 2, currently delirious, not a candidate for lhc currently.    note entered on 3.1 for services rendered on 2.29. Spent 50 minutes in patient care.

## 2024-02-29 NOTE — ED PROVIDER NOTE - PHYSICAL EXAMINATION
Minimal tachypnea w/ talking  RN chaperone: stage 2 sacral ulcer. ~quarter sized. No crepitus, firmness, induration, fluctuance. Skin is normal temp. No erythema/warmth.   Bandage overlying L lateral hip. no erythema/firmness/warmth.

## 2024-02-29 NOTE — ED PROVIDER NOTE - OBJECTIVE STATEMENT
89F PMH HTN, prior GIB, paroxysmal afib (on eliquis), HFpEF, severe MR s/p transcatheter mitral valve repair using Saulo on 07/3/19, severe TR, LBP/OA, recent fall w/ nondisplaced L greater trochanter proximal femur s/p ORIF and just dc'd to [facility] yesterday, now p/w several complaints. Pt is very pleasant but not able to answer all questions. C/o L hip pain. Denies pain elsewhere. Michelle at bedside: States that pt was c/o pain to L hip surgical site and so she wanted eval to make sure its ok. Pt was also c/o vague CP. Pt also w/ cough since her surgery and possibly looks like she is breathing heavier than usual. Also concerned about bed sore that pt had developed.   Michelle Ferrell at bedside. [phone].

## 2024-03-01 NOTE — PATIENT PROFILE ADULT - FALL HARM RISK - HARM RISK INTERVENTIONS

## 2024-03-01 NOTE — CONSULT NOTE ADULT - SUBJECTIVE AND OBJECTIVE BOX
Vascular Attending: Dr. Isael Benitez Complaint: Bilateral foot pain      HPI:  89F with hx of HTN, pAfib (on Eliquis), HFpEF (EF 55-60%, 6/2023), severe MR s/p Saulo transcathter MV repair ([facility], 7/03/19), OA, and recent hospitalization 2/21-2/28/24 for mechanical fall with course c/b non-displaced Lt great trochanter proximal femur fx s/p ORIF and was discharged to [facility], now presenting with SOB.     Per yury Ferrell, while pt was at [facility], pt was complaining of both CP and SOB as well as worsening cough. Currently, pt denies any fatigue, weakness, headache, dizziness/lightheadedness, CP, palpitations, SOB, FELIZ, orthopnea/PND, LE edema, N/V, abdominal discomfort, melena, BRBPR, hematemesis, hematuria, or recent sick contacts/travel.    While in ED, VSS. Labs on admission notable for elevated Procal 0.26 with Chest CTA noting multifocal upper lobes and medial LLL patchy opacification c/f multifocal PNA and small Rt pleural effusion; though pt is afebrile, no leukocytosis, and negative RVP. Pt also noted to have elevated hsTrop T peaking at 116 and elevated BNP 9941; EKG with Sinus with frequent PACs and RBBB (unchanged from prior). Pt received IV Vanc x1, IV CTX x1, and Lasix 20mg IVx1. Pt was then admitted to cardiology service for further monitoring and management.      VASCULAR ADDENDUM:  At 8am patient was examined by cardiology and reported bilateral feet pain and coldness. Cardiology PA examined patient and could not palpate lower extremity pulses bilaterally as well as noted cold to touch feet bilaterally. Vascular was called to assess pulses. Patient was in echo at time of evaluation however patient refused any examination due to agitation and confusion. Patient was observed standing, moving limbs, and denied any pain at that time. Patient was given 5mg zyprexa for agitation and was able to be assessed. Patient was accompanied by niece and friend (HPOA) who deny any previous history of blood clots or complaints of pain in lower extremities. Patient appeared less agitated and was able to answer examiner's questions and commands. Patient reports being cold throughout but denies any pain, weakness, or sensory changes of the bilateral lower extremities. Patient reports limited mobility of L hip due to recent surgery.    PAST MEDICAL & SURGICAL HISTORY:  Diastolic heart failure  Mitral regurgitation  Atrial fibrillation (on Eliquis)  HTN (hypertension)  H/O exploratory laparotomy  H/O total hysterectomy  History of open reduction and internal fixation (ORIF) procedure    REVIEW OF SYSTEMS: denies all except as written above    MEDICATIONS  (STANDING):  apixaban 2.5 milliGRAM(s) Oral every 12 hours  cefTRIAXone   IVPB 1000 milliGRAM(s) IV Intermittent every 24 hours  ergocalciferol 22731 Unit(s) Oral <User Schedule>  latanoprost 0.005% Ophthalmic Solution 1 Drop(s) Both EYES at bedtime  metoprolol succinate ER 25 milliGRAM(s) Oral daily  OLANZapine 2.5 milliGRAM(s) Oral once  polyethylene glycol 3350 17 Gram(s) Oral every 12 hours  senna 2 Tablet(s) Oral at bedtime    MEDICATIONS  (PRN):  acetaminophen     Tablet .. 650 milliGRAM(s) Oral every 6 hours PRN Moderate Pain (4 - 6)  buDESOnide    Inhalation Suspension 0.5 milliGRAM(s) Inhalation every 12 hours PRN Shortness of Breath  ipratropium    for Nebulization 500 MICROGram(s) Nebulizer every 6 hours PRN Shortness of Breath      Allergies  penicillin (Hives; Blisters)    FAMILY HISTORY:  No pertinent family history in first degree relatives      Vital Signs Last 24 Hrs  T(C): 36.4 (01 Mar 2024 05:25), Max: 37.1 (29 Feb 2024 20:53)  T(F): 97.5 (01 Mar 2024 05:25), Max: 98.8 (29 Feb 2024 20:53)  HR: 80 (01 Mar 2024 05:25) (80 - 97)  BP: 121/81 (01 Mar 2024 05:25) (108/73 - 129/77)  BP(mean): 86 (29 Feb 2024 22:43) (86 - 86)  RR: 18 (01 Mar 2024 05:25) (17 - 20)  SpO2: 96% (01 Mar 2024 05:25) (95% - 100%)    Parameters below as of 01 Mar 2024 05:25  Patient On (Oxygen Delivery Method): room air      PHYSICAL EXAM:  CONSTITUTIONAL: Well groomed, no apparent distress, calm s/p zyprexa 5mg  RESP: No respiratory distress, no use of accessory muscles  CV: RRR, no JVD; no peripheral edema  GI: Soft, NT, ND  NEURO: CN II-XII intact; normal reflexes in upper and lower extremities, sensation intact in upper and lower extremities b/l  Extremities: L lateral thigh covered with surgical bandages, no edema or skin discoloration noted bilaterally; full range of motion of bilateral knees, ankles, and feet; cold to touch feet bilaterally, non-mottled  Pulses: Palpable femoral and popliteal, (unable to palpate bilateral feet d/t patient compliance), triphasic AT bilaterally, tri/biphasic DP/PT bilaterally    LABS:                        14.7   6.82  )-----------( 203      ( 01 Mar 2024 05:30 )             44.9     03-01    138  |  101  |  25<H>  ----------------------------<  103<H>  4.0   |  24  |  1.11    Ca    9.8      01 Mar 2024 05:30  Mg     2.2     03-01    TPro  6.4  /  Alb  3.2<L>  /  TBili  0.8  /  DBili  x   /  AST  72<H>  /  ALT  35  /  AlkPhos  135<H>  03-01    PT/INR - ( 01 Mar 2024 05:30 )   PT: 15.5 sec;   INR: 1.37          PTT - ( 01 Mar 2024 05:30 )  PTT:35.4 sec  Urinalysis Basic - ( 01 Mar 2024 05:30 )    Color: x / Appearance: x / SG: x / pH: x  Gluc: 103 mg/dL / Ketone: x  / Bili: x / Urobili: x   Blood: x / Protein: x / Nitrite: x   Leuk Esterase: x / RBC: x / WBC x   Sq Epi: x / Non Sq Epi: x / Bacteria: x        RADIOLOGY & ADDITIONAL STUDIES    89F with hx of HTN, pAfib (on Eliquis), HFpEF (EF 55-60%, 6/2023), severe MR s/p Saulo transcathter MV repair ([facility], 7/03/19), OA, and recent hospitalization 2/21-2/28/24 for mechanical fall with course c/b non-displaced Lt great trochanter proximal femur fx s/p ORIF and was discharged to [facility], now presenting with SOB. Vascular consulted for acute bilateral feet pain and non-palpable pulses.    Recommendations:      Discussed with chief resident and attending Vascular Attending: Dr. Isael Benitez Complaint: Bilateral foot pain      HPI:  89F with hx of HTN, pAfib (on Eliquis), HFpEF (EF 55-60%, 6/2023), severe MR s/p Saulo transcathter MV repair ([facility], 7/03/19), OA, and recent hospitalization 2/21-2/28/24 for mechanical fall with course c/b non-displaced Lt great trochanter proximal femur fx s/p ORIF and was discharged to [facility], now presenting with SOB.     Per yury Ferrell, while pt was at [facility], pt was complaining of both CP and SOB as well as worsening cough. Currently, pt denies any fatigue, weakness, headache, dizziness/lightheadedness, CP, palpitations, SOB, FELIZ, orthopnea/PND, LE edema, N/V, abdominal discomfort, melena, BRBPR, hematemesis, hematuria, or recent sick contacts/travel.    While in ED, VSS. Labs on admission notable for elevated Procal 0.26 with Chest CTA noting multifocal upper lobes and medial LLL patchy opacification c/f multifocal PNA and small Rt pleural effusion; though pt is afebrile, no leukocytosis, and negative RVP. Pt also noted to have elevated hsTrop T peaking at 116 and elevated BNP 9941; EKG with Sinus with frequent PACs and RBBB (unchanged from prior). Pt received IV Vanc x1, IV CTX x1, and Lasix 20mg IVx1. Pt was then admitted to cardiology service for further monitoring and management.      VASCULAR ADDENDUM:  At 8am patient was examined by cardiology and reported bilateral feet pain and coldness. Cardiology PA examined patient and could not palpate lower extremity pulses bilaterally as well as noted cold to touch feet bilaterally. Vascular was called to assess pulses. Patient was in echo at time of evaluation however patient refused any examination due to agitation and confusion. Patient was observed standing, moving limbs, and denied any pain at that time. Patient was given 5mg zyprexa for agitation and was able to be assessed. Patient was accompanied by niece and friend (HPOA) who deny any previous history of blood clots or complaints of pain in lower extremities. Patient appeared less agitated and was able to answer examiner's questions and commands. Patient reports being cold throughout but denies any pain, weakness, or sensory changes of the bilateral lower extremities. Patient reports limited mobility of L hip due to recent surgery.    PAST MEDICAL & SURGICAL HISTORY:  Diastolic heart failure  Mitral regurgitation  Atrial fibrillation (on Eliquis)  HTN (hypertension)  H/O exploratory laparotomy  H/O total hysterectomy  History of open reduction and internal fixation (ORIF) procedure    REVIEW OF SYSTEMS: denies all except as written above    MEDICATIONS  (STANDING):  apixaban 2.5 milliGRAM(s) Oral every 12 hours  cefTRIAXone   IVPB 1000 milliGRAM(s) IV Intermittent every 24 hours  ergocalciferol 99810 Unit(s) Oral <User Schedule>  latanoprost 0.005% Ophthalmic Solution 1 Drop(s) Both EYES at bedtime  metoprolol succinate ER 25 milliGRAM(s) Oral daily  OLANZapine 2.5 milliGRAM(s) Oral once  polyethylene glycol 3350 17 Gram(s) Oral every 12 hours  senna 2 Tablet(s) Oral at bedtime    MEDICATIONS  (PRN):  acetaminophen     Tablet .. 650 milliGRAM(s) Oral every 6 hours PRN Moderate Pain (4 - 6)  buDESOnide    Inhalation Suspension 0.5 milliGRAM(s) Inhalation every 12 hours PRN Shortness of Breath  ipratropium    for Nebulization 500 MICROGram(s) Nebulizer every 6 hours PRN Shortness of Breath      Allergies  penicillin (Hives; Blisters)    FAMILY HISTORY:  No pertinent family history in first degree relatives      Vital Signs Last 24 Hrs  T(C): 36.4 (01 Mar 2024 05:25), Max: 37.1 (29 Feb 2024 20:53)  T(F): 97.5 (01 Mar 2024 05:25), Max: 98.8 (29 Feb 2024 20:53)  HR: 80 (01 Mar 2024 05:25) (80 - 97)  BP: 121/81 (01 Mar 2024 05:25) (108/73 - 129/77)  BP(mean): 86 (29 Feb 2024 22:43) (86 - 86)  RR: 18 (01 Mar 2024 05:25) (17 - 20)  SpO2: 96% (01 Mar 2024 05:25) (95% - 100%)    Parameters below as of 01 Mar 2024 05:25  Patient On (Oxygen Delivery Method): room air      PHYSICAL EXAM:  CONSTITUTIONAL: Well groomed, no apparent distress, calm s/p zyprexa 5mg  RESP: No respiratory distress, no use of accessory muscles  CV: RRR, no JVD; no peripheral edema  GI: Soft, NT, ND  NEURO: CN II-XII intact; normal reflexes in upper and lower extremities, sensation intact in upper and lower extremities b/l  Extremities: L lateral thigh covered with surgical bandages, no edema or skin discoloration noted bilaterally; full range of motion of bilateral knees, ankles, and feet; cold to touch feet bilaterally, non-mottled  Pulses: Palpable femoral, popliteal, and DP bilaterally, triphasic AT and PT bilaterally    LABS:                        14.7   6.82  )-----------( 203      ( 01 Mar 2024 05:30 )             44.9     03-01    138  |  101  |  25<H>  ----------------------------<  103<H>  4.0   |  24  |  1.11    Ca    9.8      01 Mar 2024 05:30  Mg     2.2     03-01    TPro  6.4  /  Alb  3.2<L>  /  TBili  0.8  /  DBili  x   /  AST  72<H>  /  ALT  35  /  AlkPhos  135<H>  03-01    PT/INR - ( 01 Mar 2024 05:30 )   PT: 15.5 sec;   INR: 1.37          PTT - ( 01 Mar 2024 05:30 )  PTT:35.4 sec  Urinalysis Basic - ( 01 Mar 2024 05:30 )    Color: x / Appearance: x / SG: x / pH: x  Gluc: 103 mg/dL / Ketone: x  / Bili: x / Urobili: x   Blood: x / Protein: x / Nitrite: x   Leuk Esterase: x / RBC: x / WBC x   Sq Epi: x / Non Sq Epi: x / Bacteria: x        RADIOLOGY & ADDITIONAL STUDIES    89F with hx of HTN, pAfib (on Eliquis), HFpEF (EF 55-60%, 6/2023), severe MR s/p Saulo transcathter MV repair ([facility], 7/03/19), OA, and recent hospitalization 2/21-2/28/24 for mechanical fall with course c/b non-displaced Lt great trochanter proximal femur fx s/p ORIF and was discharged to [facility], now presenting with SOB. Vascular consulted for acute bilateral feet pain and non-palpable pulses.    Recommendations:      Discussed with chief resident and attending Vascular Attending: Dr. Isael Benitez Complaint: Bilateral foot pain      HPI:  89F with hx of HTN, pAfib (on Eliquis), HFpEF (EF 55-60%, 6/2023), severe MR s/p Saulo transcathter MV repair ([facility], 7/03/19), OA, and recent hospitalization 2/21-2/28/24 for mechanical fall with course c/b non-displaced Lt great trochanter proximal femur fx s/p ORIF and was discharged to [facility], now presenting with SOB.     Per yury Ferrell, while pt was at [facility], pt was complaining of both CP and SOB as well as worsening cough. Currently, pt denies any fatigue, weakness, headache, dizziness/lightheadedness, CP, palpitations, SOB, FELIZ, orthopnea/PND, LE edema, N/V, abdominal discomfort, melena, BRBPR, hematemesis, hematuria, or recent sick contacts/travel.    While in ED, VSS. Labs on admission notable for elevated Procal 0.26 with Chest CTA noting multifocal upper lobes and medial LLL patchy opacification c/f multifocal PNA and small Rt pleural effusion; though pt is afebrile, no leukocytosis, and negative RVP. Pt also noted to have elevated hsTrop T peaking at 116 and elevated BNP 9941; EKG with Sinus with frequent PACs and RBBB (unchanged from prior). Pt received IV Vanc x1, IV CTX x1, and Lasix 20mg IVx1. Pt was then admitted to cardiology service for further monitoring and management.      VASCULAR ADDENDUM:  At 8am patient was examined by cardiology and reported bilateral feet pain and coldness. Cardiology PA examined patient and could not palpate lower extremity pulses bilaterally as well as noted cold to touch feet bilaterally. Vascular was called to assess pulses. Patient was in echo at time of evaluation however patient refused any examination due to agitation and confusion. Patient was observed standing, moving limbs, and denied any pain at that time. Patient was given 5mg zyprexa for agitation and was able to be assessed. Patient was accompanied by niece and friend (HPOA) who deny any previous history of blood clots or complaints of pain in lower extremities. Patient appeared less agitated and was able to answer examiner's questions and commands. Patient reports being cold throughout but denies any pain, weakness, or sensory changes of the bilateral lower extremities. Patient reports limited mobility of L hip due to recent surgery.    PAST MEDICAL & SURGICAL HISTORY:  Diastolic heart failure  Mitral regurgitation  Atrial fibrillation (on Eliquis)  HTN (hypertension)  H/O exploratory laparotomy  H/O total hysterectomy  History of open reduction and internal fixation (ORIF) procedure    REVIEW OF SYSTEMS: denies all except as written above    MEDICATIONS  (STANDING):  apixaban 2.5 milliGRAM(s) Oral every 12 hours  cefTRIAXone   IVPB 1000 milliGRAM(s) IV Intermittent every 24 hours  ergocalciferol 83016 Unit(s) Oral <User Schedule>  latanoprost 0.005% Ophthalmic Solution 1 Drop(s) Both EYES at bedtime  metoprolol succinate ER 25 milliGRAM(s) Oral daily  OLANZapine 2.5 milliGRAM(s) Oral once  polyethylene glycol 3350 17 Gram(s) Oral every 12 hours  senna 2 Tablet(s) Oral at bedtime    MEDICATIONS  (PRN):  acetaminophen     Tablet .. 650 milliGRAM(s) Oral every 6 hours PRN Moderate Pain (4 - 6)  buDESOnide    Inhalation Suspension 0.5 milliGRAM(s) Inhalation every 12 hours PRN Shortness of Breath  ipratropium    for Nebulization 500 MICROGram(s) Nebulizer every 6 hours PRN Shortness of Breath      Allergies  penicillin (Hives; Blisters)    FAMILY HISTORY:  No pertinent family history in first degree relatives      Vital Signs Last 24 Hrs  T(C): 36.4 (01 Mar 2024 05:25), Max: 37.1 (29 Feb 2024 20:53)  T(F): 97.5 (01 Mar 2024 05:25), Max: 98.8 (29 Feb 2024 20:53)  HR: 80 (01 Mar 2024 05:25) (80 - 97)  BP: 121/81 (01 Mar 2024 05:25) (108/73 - 129/77)  BP(mean): 86 (29 Feb 2024 22:43) (86 - 86)  RR: 18 (01 Mar 2024 05:25) (17 - 20)  SpO2: 96% (01 Mar 2024 05:25) (95% - 100%)    Parameters below as of 01 Mar 2024 05:25  Patient On (Oxygen Delivery Method): room air      PHYSICAL EXAM:  CONSTITUTIONAL: Well groomed, no apparent distress, calm s/p zyprexa 5mg  RESP: No respiratory distress, no use of accessory muscles  CV: RRR, no JVD; no peripheral edema  GI: Soft, NT, ND  NEURO: CN II-XII intact; normal reflexes in upper and lower extremities, sensation intact in upper and lower extremities b/l  Extremities: L lateral thigh covered with surgical bandages, no edema or skin discoloration noted bilaterally; full range of motion of bilateral knees, ankles, and feet; cold but equal toes bilaterally, non-mottled  Pulses: Palpable femoral, popliteal, and DP bilaterally, triphasic AT and tri/biphasic PT bilaterally    LABS:                        14.7   6.82  )-----------( 203      ( 01 Mar 2024 05:30 )             44.9     03-01    138  |  101  |  25<H>  ----------------------------<  103<H>  4.0   |  24  |  1.11    Ca    9.8      01 Mar 2024 05:30  Mg     2.2     03-01    TPro  6.4  /  Alb  3.2<L>  /  TBili  0.8  /  DBili  x   /  AST  72<H>  /  ALT  35  /  AlkPhos  135<H>  03-01    PT/INR - ( 01 Mar 2024 05:30 )   PT: 15.5 sec;   INR: 1.37          PTT - ( 01 Mar 2024 05:30 )  PTT:35.4 sec  Urinalysis Basic - ( 01 Mar 2024 05:30 )    Color: x / Appearance: x / SG: x / pH: x  Gluc: 103 mg/dL / Ketone: x  / Bili: x / Urobili: x   Blood: x / Protein: x / Nitrite: x   Leuk Esterase: x / RBC: x / WBC x   Sq Epi: x / Non Sq Epi: x / Bacteria: x        RADIOLOGY & ADDITIONAL STUDIES    89F with hx of HTN, pAfib (on Eliquis), HFpEF (EF 55-60%, 6/2023), severe MR s/p Saulo transcathter MV repair ([facility], 7/03/19), OA, and recent hospitalization 2/21-2/28/24 for mechanical fall with course c/b non-displaced Lt great trochanter proximal femur fx s/p ORIF and was discharged to [facility], now presenting with SOB. Vascular consulted for acute bilateral feet pain and non-palpable pulses.    Recommendations:  No evidence of a vascular pathology  Patient with palpable pulses throughout upper and lower extremities  Does not endorse lower extremity discomfort, pain, or sensation changes  Vascular surgery to sign off  Continue care per primary    Discussed with chief resident and attending

## 2024-03-01 NOTE — PROGRESS NOTE ADULT - ASSESSMENT
89F DNR/DNI with hx of HTN, pAfib (on Eliquis), HFpEF (EF 55-60%, 6/2023 ->EF 45%), severe MR s/p Saulo transcatheter MV repair ([facility], 7/03/19), OA, and recent hospitalization 2/21-2/28/24 for mechanical fall with course c/b non-displaced Lt great trochanter proximal femur fx s/p ORIF and was discharged to [facility], presented w/ SOB and worsening cough c/f volume overload and multifocal opacities concerning for PNA, started on IV Abx.

## 2024-03-01 NOTE — PROGRESS NOTE ADULT - PROBLEM SELECTOR PLAN 4
- Meds as above     - Fluids: not indicated  - Replete Lytes PRN to K>4, Mg>2  - Diet: minced   - DVT ppx: Eliquis   - SW/Dispo: Medically active - Meds as above     - Fluids: not indicated  - Replete Lytes PRN to K>4, Mg>2  - Diet: minced w/ aspiration precautions  - DVT ppx: Eliquis   - SW/Dispo: Medically active

## 2024-03-01 NOTE — CONSULT NOTE ADULT - TIME BILLING
Review of hospital course, labs, vitals, medical records.   Bedside exam and interview   Discussed plan of care with primary team ACP and attending   Documenting the encounter

## 2024-03-01 NOTE — PROGRESS NOTE ADULT - PROBLEM SELECTOR PLAN 1
- Diuresis:  (Home: Torsemide 20mg QD)   - GDMT: Metoprolol XL 25mg QD      o Can consider Spironolactone 25mg QD for further GDMT optimization  - continue strict I&O    #Bilateral Multifocal Opacities   - appreciate Medicine co management  - Continue IV Ceftriaxone 2g Q24 and PO Doxycycline 100 mg BID x 5 days (03/01-03/05) for empiric coverage  - f/u MRSA swab, full RVP, Urine Strep Pneumo Antigen, Urine Legionella Antigen - Diuresis:  (Home: Torsemide 20mg QD)   - GDMT: Metoprolol XL 25mg QD      o Can consider Spironolactone 25mg QD for further GDMT optimization  - continue strict I&O    #Bilateral Multifocal Opacities   - appreciate Medicine co management  - Continue IV Ceftriaxone 2g Q24 (last dose 03/04/24) and PO Doxycycline 100 mg BID x 5 days (03/01-03/05) for empiric coverage  - f/u MRSA swab, full RVP, Urine Strep Pneumo Antigen, Urine Legionella Antigen

## 2024-03-01 NOTE — CONSULT NOTE ADULT - SUBJECTIVE AND OBJECTIVE BOX
Admission H&P:    89F with hx of HTN, pAfib (on Eliquis), HFpEF (EF 55-60%, 6/2023), severe MR s/p Saulo transcathter MV repair ([facility], 7/03/19), OA, and recent hospitalization 2/21-2/28/24 for mechanical fall with course c/b non-displaced Lt great trochanter proximal femur fx s/p ORIF and was discharged to [facility], now presenting with SOB.   Per niece Mariaelena, while pt was at [facility], pt was complaining of both CP and SOB as well as worsening cough. Currently, pt denies any fatigue, weakness, headache, dizziness/lightheadedness, CP, palpitations, SOB, FELIZ, orthopnea/PND, LE edema, N/V, abdominal discomfort, melena, BRBPR, hematemesis, hematuria, or recent sick contacts/travel.  While in ED, VSS. Labs on admission notable for elevated Procal 0.26 with Chest CTA noting multifocal upper lobes and medial LLL patchy opacification c/f multifocal PNA and small Rt pleural effusion; though pt is afebrile, no leukocytosis, and negative RVP. Pt also noted to have elevated hsTrop T peaking at 116 and elevated BNP 9941; EKG with Sinus with frequent PACs and RBBB (unchanged from prior). Pt received IV Vanc x1, IV CTX x1, and Lasix 20mg IVx1. Pt was then admitted to cardiology service for further monitoring and management.        At time of evaluation by Internal medicine co-management service today (3/1/24), no nausea, not complaining of any dyspnea or angina.        (29 Feb 2024 22:43)    PAST MEDICAL & SURGICAL HISTORY:  Diastolic heart failure  Mitral regurgitation      Atrial fibrillation  HTN (hypertension)  H/O exploratory laparotomy  H/O total hysterectomy  History of open reduction and internal fixation (ORIF) procedure      Home Medications:  acetaminophen 325 mg oral tablet: 3 tab(s) orally every 6 hours (23 Feb 2024 16:38)  ergocalciferol 1.25 mg (50,000 intl units) oral capsule: 1 cap(s) orally once a week (23 Feb 2024 11:27)  polyethylene glycol 3350 oral powder for reconstitution: 17 gram(s) orally every 12 hours (23 Feb 2024 11:27)  senna leaf extract oral tablet: 2 tab(s) orally once a day (at bedtime) (23 Feb 2024 11:27)    Allergies    penicillin (Hives; Blisters)    Intolerances      FAMILY HISTORY:  No pertinent family history in first degree relatives      Social History:  Denies ETOH, tobacco, or illicit drug use (29 Feb 2024 22:43)      REVIEW OF SYSTEMS:  RESPIRATORY: No cough, No dyspnea  CARDIOVASCULAR: No chest pain, or leg swelling  NEUROLOGICAL: No numbness, or tremors  ALLERGY AND IMMUNOLOGIC: No hives or eczema    Diet, DASH/TLC:   Sodium & Cholesterol Restricted (02-29-24 @ 20:38) [Active]      CURRENT MEDICATIONS:   acetaminophen     Tablet .. 650 milliGRAM(s) Oral every 6 hours PRN  apixaban 2.5 milliGRAM(s) Oral every 12 hours  buDESOnide    Inhalation Suspension 0.5 milliGRAM(s) Inhalation every 12 hours PRN  cefTRIAXone   IVPB 1000 milliGRAM(s) IV Intermittent every 24 hours  ergocalciferol 74617 Unit(s) Oral <User Schedule>  ipratropium    for Nebulization 500 MICROGram(s) Nebulizer every 6 hours PRN  latanoprost 0.005% Ophthalmic Solution 1 Drop(s) Both EYES at bedtime  metoprolol succinate ER 25 milliGRAM(s) Oral daily  polyethylene glycol 3350 17 Gram(s) Oral every 12 hours  senna 2 Tablet(s) Oral at bedtime      VITAL SIGNS, INS/OUTS (last 24 hours):  Vital Signs Last 24 Hrs  T(C): 36.4 (01 Mar 2024 05:25), Max: 37.1 (29 Feb 2024 20:53)  T(F): 97.5 (01 Mar 2024 05:25), Max: 98.8 (29 Feb 2024 20:53)  HR: 80 (01 Mar 2024 05:25) (80 - 97)  BP: 121/81 (01 Mar 2024 05:25) (108/73 - 129/77)  BP(mean): 86 (29 Feb 2024 22:43) (86 - 86)  RR: 18 (01 Mar 2024 05:25) (17 - 20)  SpO2: 96% (01 Mar 2024 05:25) (95% - 100%)    Parameters below as of 01 Mar 2024 05:25  Patient On (Oxygen Delivery Method): room air      I&O's Summary      PHYSICAL EXAM:  Gen: Reclining in bed at time of exam, appears stated age  HEENT: NCAT, MMM, clear OP  Neck: supple, trachea at midline  CV: +S1/S2  Pulm: adequate respiratory effort, no increase in work of breathing  Abd: soft, ND  Skin: warm and dry,  Ext: no LE edema   Neuro: Alert, oriented to hospital, situation. speaking in full sentences   Psych: affect and behavior appropriate, pleasant at time of interview    BASIC LABS:                        14.7   6.82  )-----------( 203      ( 01 Mar 2024 05:30 )             44.9     03-01    138  |  101  |  25<H>  ----------------------------<  103<H>  4.0   |  24  |  1.11    Ca    9.8      01 Mar 2024 05:30  Mg     2.2     03-01    TPro  6.4  /  Alb  3.2<L>  /  TBili  0.8  /  DBili  x   /  AST  72<H>  /  ALT  35  /  AlkPhos  135<H>  03-01    PT/INR - ( 01 Mar 2024 05:30 )   PT: 15.5 sec;   INR: 1.37          PTT - ( 01 Mar 2024 05:30 )  PTT:35.4 sec  Urinalysis Basic - ( 01 Mar 2024 05:30 )    Color: x / Appearance: x / SG: x / pH: x  Gluc: 103 mg/dL / Ketone: x  / Bili: x / Urobili: x   Blood: x / Protein: x / Nitrite: x   Leuk Esterase: x / RBC: x / WBC x   Sq Epi: x / Non Sq Epi: x / Bacteria: x      CAPILLARY BLOOD GLUCOSE          OTHER LABS:        MICRODATA:    Culture - Blood (collected 29 Feb 2024 17:40)  Source: .Blood Blood-Venous  Preliminary Report (01 Mar 2024 10:00):    No growth at 12 hours    Culture - Blood (collected 29 Feb 2024 16:45)  Source: .Blood Blood-Peripheral  Preliminary Report (01 Mar 2024 10:00):    No growth at 12 hours        IMAGING:    EKG:    #Diet - Diet, DASH/TLC:   Sodium & Cholesterol Restricted (02-29-24 @ 20:38) [Active]        #DVT PPx -  Admission H&P:    89F with hx of HTN, pAfib (on Eliquis), HFpEF (EF 55-60%, 6/2023), severe MR s/p Saulo transcathter MV repair ([facility], 7/03/19), OA, and recent hospitalization 2/21-2/28/24 for mechanical fall with course c/b non-displaced Lt great trochanter proximal femur fx s/p ORIF and was discharged to [facility], now presenting with SOB.   Per niece Mariaelena, while pt was at [facility], pt was complaining of both CP and SOB as well as worsening cough. Currently, pt denies any fatigue, weakness, headache, dizziness/lightheadedness, CP, palpitations, SOB, FELIZ, orthopnea/PND, LE edema, N/V, abdominal discomfort, melena, BRBPR, hematemesis, hematuria, or recent sick contacts/travel.  While in ED, VSS. Labs on admission notable for elevated Procal 0.26 with Chest CTA noting multifocal upper lobes and medial LLL patchy opacification c/f multifocal PNA and small Rt pleural effusion; though pt is afebrile, no leukocytosis, and negative RVP. Pt also noted to have elevated hsTrop T peaking at 116 and elevated BNP 9941; EKG with Sinus with frequent PACs and RBBB (unchanged from prior). Pt received IV Vanc x1, IV CTX x1, and Lasix 20mg IVx1. Pt was then admitted to cardiology service for further monitoring and management.        At time of evaluation by Internal medicine co-management service today (3/1/24), no nausea, not complaining of any dyspnea or angina.        (29 Feb 2024 22:43)    PAST MEDICAL & SURGICAL HISTORY:  Diastolic heart failure  Mitral regurgitation      Atrial fibrillation  HTN (hypertension)  H/O exploratory laparotomy  H/O total hysterectomy  History of open reduction and internal fixation (ORIF) procedure      Home Medications:  acetaminophen 325 mg oral tablet: 3 tab(s) orally every 6 hours (23 Feb 2024 16:38)  ergocalciferol 1.25 mg (50,000 intl units) oral capsule: 1 cap(s) orally once a week (23 Feb 2024 11:27)  polyethylene glycol 3350 oral powder for reconstitution: 17 gram(s) orally every 12 hours (23 Feb 2024 11:27)  senna leaf extract oral tablet: 2 tab(s) orally once a day (at bedtime) (23 Feb 2024 11:27)    Allergies    penicillin (Hives; Blisters)    Intolerances      FAMILY HISTORY:  No pertinent family history in first degree relatives      Social History:  Denies ETOH, tobacco, or illicit drug use (29 Feb 2024 22:43)      REVIEW OF SYSTEMS:  RESPIRATORY: No cough, No dyspnea  CARDIOVASCULAR: No chest pain, or leg swelling  NEUROLOGICAL: No numbness, or tremors  ALLERGY AND IMMUNOLOGIC: No hives or eczema    Diet, DASH/TLC:   Sodium & Cholesterol Restricted (02-29-24 @ 20:38) [Active]      CURRENT MEDICATIONS:   acetaminophen     Tablet .. 650 milliGRAM(s) Oral every 6 hours PRN  apixaban 2.5 milliGRAM(s) Oral every 12 hours  buDESOnide    Inhalation Suspension 0.5 milliGRAM(s) Inhalation every 12 hours PRN  cefTRIAXone   IVPB 1000 milliGRAM(s) IV Intermittent every 24 hours  ergocalciferol 71373 Unit(s) Oral <User Schedule>  ipratropium    for Nebulization 500 MICROGram(s) Nebulizer every 6 hours PRN  latanoprost 0.005% Ophthalmic Solution 1 Drop(s) Both EYES at bedtime  metoprolol succinate ER 25 milliGRAM(s) Oral daily  polyethylene glycol 3350 17 Gram(s) Oral every 12 hours  senna 2 Tablet(s) Oral at bedtime      VITAL SIGNS, INS/OUTS (last 24 hours):  Vital Signs Last 24 Hrs  T(C): 36.4 (01 Mar 2024 05:25), Max: 37.1 (29 Feb 2024 20:53)  T(F): 97.5 (01 Mar 2024 05:25), Max: 98.8 (29 Feb 2024 20:53)  HR: 80 (01 Mar 2024 05:25) (80 - 97)  BP: 121/81 (01 Mar 2024 05:25) (108/73 - 129/77)  BP(mean): 86 (29 Feb 2024 22:43) (86 - 86)  RR: 18 (01 Mar 2024 05:25) (17 - 20)  SpO2: 96% (01 Mar 2024 05:25) (95% - 100%)    Parameters below as of 01 Mar 2024 05:25  Patient On (Oxygen Delivery Method): room air      I&O's Summary      PHYSICAL EXAM:  Gen: Reclining in bed at time of exam, appears stated age  HEENT: NCAT, MMM, clear OP  Neck: supple, trachea at midline  CV: +S1/S2  Pulm: adequate respiratory effort, no increase in work of breathing  Abd: soft, ND  Skin: warm and dry,  Ext: no LE edema   Sacral wound, shallow, stage unclear   Neuro: Alert, oriented to hospital, situation. speaking in full sentences   Psych: affect and behavior appropriate, pleasant at time of interview    BASIC LABS:                        14.7   6.82  )-----------( 203      ( 01 Mar 2024 05:30 )             44.9     03-01    138  |  101  |  25<H>  ----------------------------<  103<H>  4.0   |  24  |  1.11    Ca    9.8      01 Mar 2024 05:30  Mg     2.2     03-01    TPro  6.4  /  Alb  3.2<L>  /  TBili  0.8  /  DBili  x   /  AST  72<H>  /  ALT  35  /  AlkPhos  135<H>  03-01    PT/INR - ( 01 Mar 2024 05:30 )   PT: 15.5 sec;   INR: 1.37          PTT - ( 01 Mar 2024 05:30 )  PTT:35.4 sec  Urinalysis Basic - ( 01 Mar 2024 05:30 )    Color: x / Appearance: x / SG: x / pH: x  Gluc: 103 mg/dL / Ketone: x  / Bili: x / Urobili: x   Blood: x / Protein: x / Nitrite: x   Leuk Esterase: x / RBC: x / WBC x   Sq Epi: x / Non Sq Epi: x / Bacteria: x      CAPILLARY BLOOD GLUCOSE          OTHER LABS:        MICRODATA:    Culture - Blood (collected 29 Feb 2024 17:40)  Source: .Blood Blood-Venous  Preliminary Report (01 Mar 2024 10:00):    No growth at 12 hours    Culture - Blood (collected 29 Feb 2024 16:45)  Source: .Blood Blood-Peripheral  Preliminary Report (01 Mar 2024 10:00):    No growth at 12 hours        IMAGING:    EKG:    #Diet - Diet, DASH/TLC:   Sodium & Cholesterol Restricted (02-29-24 @ 20:38) [Active]        #DVT PPx -  Admission H&P:    89F with hx of HTN, pAfib (on Eliquis), HFpEF (EF 55-60%, 6/2023), severe MR s/p Saulo transcathter MV repair ([facility], 7/03/19), OA, and recent hospitalization 2/21-2/28/24 for mechanical fall with course c/b non-displaced Lt great trochanter proximal femur fx s/p ORIF and was discharged to [facility], now presenting with SOB.   Per niece Mariaelena, while pt was at [facility], pt was complaining of both CP and SOB as well as worsening cough. Currently, pt denies any fatigue, weakness, headache, dizziness/lightheadedness, CP, palpitations, SOB, FELIZ, orthopnea/PND, LE edema, N/V, abdominal discomfort, melena, BRBPR, hematemesis, hematuria, or recent sick contacts/travel.  While in ED, VSS. Labs on admission notable for elevated Procal 0.26 with Chest CTA noting multifocal upper lobes and medial LLL patchy opacification c/f multifocal PNA and small Rt pleural effusion; though pt is afebrile, no leukocytosis, and negative RVP. Pt also noted to have elevated hsTrop T peaking at 116 and elevated BNP 9941; EKG with Sinus with frequent PACs and RBBB (unchanged from prior). Pt received IV Vanc x1, IV CTX x1, and Lasix 20mg IVx1. Pt was then admitted to cardiology service for further monitoring and management.      At time of evaluation by Internal medicine co-management service today (3/1/24), no nausea, not complaining of any dyspnea or angina.     (29 Feb 2024 22:43)    PAST MEDICAL & SURGICAL HISTORY:  Diastolic heart failure  Mitral regurgitation      Atrial fibrillation  HTN (hypertension)  H/O exploratory laparotomy  H/O total hysterectomy  History of open reduction and internal fixation (ORIF) procedure    Home Medications:  acetaminophen 325 mg oral tablet: 3 tab(s) orally every 6 hours (23 Feb 2024 16:38)  ergocalciferol 1.25 mg (50,000 intl units) oral capsule: 1 cap(s) orally once a week (23 Feb 2024 11:27)  polyethylene glycol 3350 oral powder for reconstitution: 17 gram(s) orally every 12 hours (23 Feb 2024 11:27)  senna leaf extract oral tablet: 2 tab(s) orally once a day (at bedtime) (23 Feb 2024 11:27)    Allergies    penicillin (Hives; Blisters)    Intolerances      FAMILY HISTORY:  No pertinent family history in first degree relatives      Social History:  Denies ETOH, tobacco, or illicit drug use (29 Feb 2024 22:43)      REVIEW OF SYSTEMS:  RESPIRATORY: No cough, No dyspnea  CARDIOVASCULAR: No chest pain, or leg swelling  NEUROLOGICAL: No numbness, or tremors  ALLERGY AND IMMUNOLOGIC: No hives or eczema    Diet, DASH/TLC:   Sodium & Cholesterol Restricted (02-29-24 @ 20:38) [Active]      CURRENT MEDICATIONS:   acetaminophen     Tablet .. 650 milliGRAM(s) Oral every 6 hours PRN  apixaban 2.5 milliGRAM(s) Oral every 12 hours  buDESOnide    Inhalation Suspension 0.5 milliGRAM(s) Inhalation every 12 hours PRN  cefTRIAXone   IVPB 1000 milliGRAM(s) IV Intermittent every 24 hours  ergocalciferol 83155 Unit(s) Oral <User Schedule>  ipratropium    for Nebulization 500 MICROGram(s) Nebulizer every 6 hours PRN  latanoprost 0.005% Ophthalmic Solution 1 Drop(s) Both EYES at bedtime  metoprolol succinate ER 25 milliGRAM(s) Oral daily  polyethylene glycol 3350 17 Gram(s) Oral every 12 hours  senna 2 Tablet(s) Oral at bedtime      VITAL SIGNS, INS/OUTS (last 24 hours):  Vital Signs Last 24 Hrs  T(C): 36.4 (01 Mar 2024 05:25), Max: 37.1 (29 Feb 2024 20:53)  T(F): 97.5 (01 Mar 2024 05:25), Max: 98.8 (29 Feb 2024 20:53)  HR: 80 (01 Mar 2024 05:25) (80 - 97)  BP: 121/81 (01 Mar 2024 05:25) (108/73 - 129/77)  BP(mean): 86 (29 Feb 2024 22:43) (86 - 86)  RR: 18 (01 Mar 2024 05:25) (17 - 20)  SpO2: 96% (01 Mar 2024 05:25) (95% - 100%)    Parameters below as of 01 Mar 2024 05:25  Patient On (Oxygen Delivery Method): room air      I&O's Summary      PHYSICAL EXAM:  Gen: Reclining in bed at time of exam, appears stated age  HEENT: NCAT, MMM, clear OP  Neck: supple, trachea at midline  CV: +S1/S2  Pulm: adequate respiratory effort, no increase in work of breathing  Abd: soft, ND  Skin: warm and dry,  Ext: no LE edema   Sacral wound, shallow, stage unclear   Neuro: Alert, oriented to hospital, situation. speaking in full sentences   Psych: affect and behavior appropriate, pleasant at time of interview    BASIC LABS:                        14.7   6.82  )-----------( 203      ( 01 Mar 2024 05:30 )             44.9     03-01    138  |  101  |  25<H>  ----------------------------<  103<H>  4.0   |  24  |  1.11    Ca    9.8      01 Mar 2024 05:30  Mg     2.2     03-01    TPro  6.4  /  Alb  3.2<L>  /  TBili  0.8  /  DBili  x   /  AST  72<H>  /  ALT  35  /  AlkPhos  135<H>  03-01    PT/INR - ( 01 Mar 2024 05:30 )   PT: 15.5 sec;   INR: 1.37          PTT - ( 01 Mar 2024 05:30 )  PTT:35.4 sec  Urinalysis Basic - ( 01 Mar 2024 05:30 )    Color: x / Appearance: x / SG: x / pH: x  Gluc: 103 mg/dL / Ketone: x  / Bili: x / Urobili: x   Blood: x / Protein: x / Nitrite: x   Leuk Esterase: x / RBC: x / WBC x   Sq Epi: x / Non Sq Epi: x / Bacteria: x      CAPILLARY BLOOD GLUCOSE          OTHER LABS:        MICRODATA:    Culture - Blood (collected 29 Feb 2024 17:40)  Source: .Blood Blood-Venous  Preliminary Report (01 Mar 2024 10:00):    No growth at 12 hours    Culture - Blood (collected 29 Feb 2024 16:45)  Source: .Blood Blood-Peripheral  Preliminary Report (01 Mar 2024 10:00):    No growth at 12 hours        IMAGING:    EKG:    #Diet - Diet, DASH/TLC:   Sodium & Cholesterol Restricted (02-29-24 @ 20:38) [Active]        #DVT PPx -

## 2024-03-01 NOTE — PROGRESS NOTE ADULT - SUBJECTIVE AND OBJECTIVE BOX
Interventional Cardiology PA Adult Progress Note    Subjective Assessment:  Patient seen and examined at bedside while in ER holding, pleasant, A&O x 3, she stated her bilateral feet felt "cold" without acute pain or loss of sensation. Patient denied chest pain, SOB, abdominal pain, fatigue, recent fever, cough, recent body aches/chills, orthopnea and syncope.     Patient reevaluated at Echo Lab 3 hours later - very agitated and uncooperative. Given IM 2.5 mg Zyprexa x 2 with good response, no respiratory ditress. Patient experiencing hospital acquiried delirium vs sundowning.     ROS Negative except as per Subjective and HPI  	  MEDICATIONS:  metoprolol succinate ER 25 milliGRAM(s) Oral daily  cefTRIAXone   IVPB 1000 milliGRAM(s) IV Intermittent every 24 hours  doxycycline monohydrate Capsule 100 milliGRAM(s) Oral every 12 hours  buDESOnide    Inhalation Suspension 0.5 milliGRAM(s) Inhalation every 12 hours PRN  ipratropium    for Nebulization 500 MICROGram(s) Nebulizer every 6 hours PRN  acetaminophen     Tablet .. 650 milliGRAM(s) Oral every 6 hours PRN  polyethylene glycol 3350 17 Gram(s) Oral every 12 hours  senna 2 Tablet(s) Oral at bedtime  apixaban 2.5 milliGRAM(s) Oral every 12 hours  ergocalciferol 59705 Unit(s) Oral <User Schedule>  latanoprost 0.005% Ophthalmic Solution 1 Drop(s) Both EYES at bedtime  thiamine IVPB 500 milliGRAM(s) IV Intermittent daily      	    [PHYSICAL EXAM:  TELEMETRY:  T(C): 36.5 (03-01-24 @ 17:35), Max: 37.1 (02-29-24 @ 20:53)  HR: 86 (03-01-24 @ 17:35) (80 - 88)  BP: 147/88 (03-01-24 @ 17:35) (108/73 - 147/88)  RR: 16 (03-01-24 @ 17:35) (16 - 20)  SpO2: 97% (03-01-24 @ 17:35) (95% - 100%)  Wt(kg): --  I&O's Summary    01 Mar 2024 07:01  -  01 Mar 2024 18:55  --------------------------------------------------------  IN: 0 mL / OUT: 400 mL / NET: -400 mL    Appearance: Normal	  HEENT:   missing teeth  Neck: + JVD  Cardiovascular: No murmurs,   Respiratory: Lungs clear to auscultation  Skin: sacral wound that was present on previous admission, with C/D/I pressure dressing   Extremities: Normal range of motion, No clubbing, cyanosis or edema  Vascular: pulses not palpable, bilateral feet cold, +sensation, +movement   Neurologic: Non-focal, R and L UE strength and sensation 5/5, R and L LE 5/5 strength and sensation  Psychiatry: A & O x 3, pleasant at first and very agitated later                          14.7   6.82  )-----------( 203      ( 01 Mar 2024 05:30 )             44.9     03-01    138  |  101  |  25<H>  ----------------------------<  103<H>  4.0   |  24  |  1.11    Ca    9.8      01 Mar 2024 05:30  Mg     2.2     03-01    TPro  6.4  /  Alb  3.2<L>  /  TBili  0.8  /  DBili  x   /  AST  72<H>  /  ALT  35  /  AlkPhos  135<H>  03-01  PT/INR - ( 01 Mar 2024 05:30 )   PT: 15.5 sec;   INR: 1.37          PTT - ( 01 Mar 2024 05:30 )  PTT:35.4 sec   Interventional Cardiology PA Adult Progress Note    Subjective Assessment:  Patient seen and examined at bedside while in ER holding, pleasant, A&O x 3, she stated her bilateral feet felt "cold" without acute pain or loss of sensation. Patient denied chest pain, SOB, abdominal pain, fatigue, recent fever, cough, recent body aches/chills, orthopnea and syncope.     Patient reevaluated at Echo Lab 3 hours later - very agitated and uncooperative. Given IM 2.5 mg Zyprexa x 2 with good response, no respiratory ditress. Patient experiencing hospital acquiried delirium vs sundowning.     ROS Negative except as per Subjective and HPI  	  MEDICATIONS:  metoprolol succinate ER 25 milliGRAM(s) Oral daily  cefTRIAXone   IVPB 1000 milliGRAM(s) IV Intermittent every 24 hours  doxycycline monohydrate Capsule 100 milliGRAM(s) Oral every 12 hours  buDESOnide    Inhalation Suspension 0.5 milliGRAM(s) Inhalation every 12 hours PRN  ipratropium    for Nebulization 500 MICROGram(s) Nebulizer every 6 hours PRN  acetaminophen     Tablet .. 650 milliGRAM(s) Oral every 6 hours PRN  polyethylene glycol 3350 17 Gram(s) Oral every 12 hours  senna 2 Tablet(s) Oral at bedtime  apixaban 2.5 milliGRAM(s) Oral every 12 hours  ergocalciferol 50060 Unit(s) Oral <User Schedule>  latanoprost 0.005% Ophthalmic Solution 1 Drop(s) Both EYES at bedtime  thiamine IVPB 500 milliGRAM(s) IV Intermittent daily      	    [PHYSICAL EXAM:  TELEMETRY:  T(C): 36.5 (03-01-24 @ 17:35), Max: 37.1 (02-29-24 @ 20:53)  HR: 86 (03-01-24 @ 17:35) (80 - 88)  BP: 147/88 (03-01-24 @ 17:35) (108/73 - 147/88)  RR: 16 (03-01-24 @ 17:35) (16 - 20)  SpO2: 97% (03-01-24 @ 17:35) (95% - 100%)  Wt(kg): --  I&O's Summary    01 Mar 2024 07:01  -  01 Mar 2024 18:55  --------------------------------------------------------  IN: 0 mL / OUT: 400 mL / NET: -400 mL    Appearance: Normal	  HEENT:   missing teeth  Neck: + JVD  Cardiovascular: No murmurs,   Respiratory: Lungs clear to auscultation  Skin: sacral wound that was present on previous admission, with C/D/I pressure dressing   Extremities: Normal range of motion, No clubbing, cyanosis or edema  Vascular: pulses not palpable, bilateral feet cold, +sensation, +movement   Neurologic: Non-focal, R and L UE strength and sensation 5/5, R and L LE 5/5 strength and sensation  Psychiatry: A & O x 3, pleasant at first and very agitated later                          14.7   6.82  )-----------( 203      ( 01 Mar 2024 05:30 )             44.9     03-01    138  |  101  |  25<H>  ----------------------------<  103<H>  4.0   |  24  |  1.11    Ca    9.8      01 Mar 2024 05:30  Mg     2.2     03-01    TPro  6.4  /  Alb  3.2<L>  /  TBili  0.8  /  DBili  x   /  AST  72<H>  /  ALT  35  /  AlkPhos  135<H>  03-01  PT/INR - ( 01 Mar 2024 05:30 )   PT: 15.5 sec;   INR: 1.37          PTT - ( 01 Mar 2024 05:30 )  PTT:35.4 sec

## 2024-03-01 NOTE — CONSULT NOTE ADULT - ASSESSMENT
*******Incomplete note     ...given CT findings favor treating empirically,   [ ] zosyn 3.375 q 8 x 4 more days   [ ] Recommend adding on procalcitonin and Cell Diff to AM labs, checking procal and CBC with cell diff tomorrow AM   [ ] Recommend MRSA swab, Full Respiratory Viral Panel, Urine Strep Pneumo Antigen, Urine Legionella Antigen   - If MRSA swab negative, no need to add on Vanc             *******Incomplete note     ...given CT findings favor treating empirically,   [ ] Ceftriaxone 2g qd x 4 more days  [ ] Recommend adding on procalcitonin and Cell Diff to AM labs, checking procal and CBC with cell diff tomorrow AM   [ ] Recommend MRSA swab, Full Respiratory Viral Panel, Urine Strep Pneumo Antigen, Urine Legionella Antigen   - If MRSA swab negative, no need to add on Vanc         89 yo woman with HTN, hx of GIB, paroxysmal afib (on eliquis 2.5mg BID), HFpEF, severe MR s/p transcatheter mitral valve repair using Saulo on 07/3/19 (Cl[employer] IID research trial), severe TR, LBP/OA, gingivitis, recent admission (Feb 20th - Feb 28th) for left hip fracture s/p intramedullary mando with ortho. Was discharged to rehab. Now presenting from rehab one day later -- per family, was complaining of shortness of breath, ?chest discomfort.     # Chronic HFpEF  # HTN   # Hx of mitral valve repair   On chart review, Outpatient Torsemide regimen was increased from 10mg to 20mg qd in Dec 2023.   Torsemide decreased from 20mg qd to 10mg qd during Feb 2024 admission due to YASH.   Was discharged to rehab on 10mg qd of torsemide  2.29.24                          --- 9941  2.24.24                          --- 4699  9.22.23                          --- 970  5.16.23                          --- 793  4.18.23                          --- 752  No LE edema, CT Chest without florid volume overload; Yet, BNP much higher than baseline.  >9x outpatient baseline.   [ ] f/u TTE  [ ] f/u structural heart recs   diuretic regimen per primary team     # Bilateral multifocal opacities.   Today, without fever, leukocytosis, cough, or dyspnea. Low   Procal on admission mildly elevated 0.26  Discussed with primary team. Low suspicion for pneumonia. However, given CT Chest findings of multifocal opacities, favor treating empirically with short course of abx (total 5 days)   [ ] Ceftriaxone 2g qd x 4 more days (end date 3/4/2024)  [ ] Recommend adding on procalcitonin and Cell Diff to AM labs, checking procal and CBC with cell diff tomorrow AM   [ ] Recommend MRSA swab, Full Respiratory Viral Panel, Urine Strep Pneumo Antigen, Urine Legionella Antigen   - If MRSA swab negative, no need to add on Vanc      # Chronic Atrial fibrillation - on apixaban 2.5mg BID, on metoprolol succinate 25mg qd.   # Vitamin D insufficiency - Vitamin D 25 Hydroxy of 23.5 on last admission. [ ] continue Vitamin D 50,000 units q week. transition to qd outpatient once replete.    89 yo woman with HTN, hx of GIB, paroxysmal afib (on eliquis 2.5mg BID), HFpEF, severe MR s/p transcatheter mitral valve repair using Saulo on 07/3/19 (Cl[employer] IID research trial), severe TR, LBP/OA, gingivitis, recent admission (Feb 20th - Feb 28th) for left hip fracture s/p intramedullary mando with ortho. Was discharged to rehab. Now presenting from rehab one day later -- per family, was complaining of shortness of breath, ?chest discomfort.     # Chronic HFpEF  # HTN   # Hx of mitral valve repair   On chart review, Outpatient Torsemide regimen was increased from 10mg to 20mg qd in Dec 2023.   Torsemide decreased from 20mg qd to 10mg qd during Feb 2024 admission due to YASH.   Was discharged to rehab on 10mg qd of torsemide  2.29.24                          --- 9941  2.24.24                          --- 4699  9.22.23                          --- 970  5.16.23                          --- 793  4.18.23                          --- 752  No LE edema, CT Chest without florid volume overload; Yet, BNP much higher than baseline.  >9x outpatient baseline.   [ ] f/u TTE  [ ] f/u structural heart recs   diuretic regimen per primary team     # Bilateral multifocal opacities.   Today, without fever, leukocytosis, cough, or dyspnea. Low   Procal on admission mildly elevated 0.26  Discussed with primary team. Low suspicion for pneumonia. However, given CT Chest findings of multifocal opacities, favor treating empirically with short course of abx (total 5 days)   [ ] Ceftriaxone 2g qd x 4 more days (end date 3/4/2024)  [ ] Recommend adding on procalcitonin and Cell Diff to AM labs, checking procal and CBC with cell diff tomorrow AM   [ ] Recommend MRSA swab, Full Respiratory Viral Panel, Urine Strep Pneumo Antigen, Urine Legionella Antigen   - If MRSA swab negative, no need to add on Vanc      # Chronic Atrial fibrillation - on apixaban 2.5mg BID, on metoprolol succinate 25mg qd.   # Vitamin D insufficiency - Vitamin D 25 Hydroxy of 23.5 on last admission. [ ] continue Vitamin D 50,000 units q week. transition to qd outpatient once replete.   # Sacral wound (present on admission)- recommend wound care consult     DVT ppx - already on apixaban 2.5mg BID    89 yo woman with HTN, hx of GIB, paroxysmal afib (on eliquis 2.5mg BID), HFpEF, severe MR s/p transcatheter mitral valve repair using Saulo on 07/3/19 (Clasp IID research trial), severe TR, LBP/OA, gingivitis, recent admission (Feb 20th - Feb 28th) for left hip fracture s/p intramedullary mando with ortho. Was discharged to rehab. Now presenting from rehab one day later -- per family, was complaining of shortness of breath, ?chest discomfort.     # Chronic HFpEF  # HTN   # Hx of mitral valve repair   On chart review, Outpatient Torsemide regimen was increased from 10mg to 20mg qd in Dec 2023.   Torsemide decreased from 20mg qd to 10mg qd during Feb 2024 admission due to YASH.   Was discharged to rehab on 10mg qd of torsemide  2.29.24                          --- 9941  2.24.24                          --- 4699  9.22.23                          --- 970  5.16.23                          --- 793  4.18.23                          --- 752  No LE edema, CT Chest without florid volume overload; Yet, BNP much higher than baseline.  >9x outpatient baseline.   [ ] f/u TTE  [ ] f/u structural heart recs   diuretic regimen per primary team     # Bilateral multifocal opacities.   Today, without fever, leukocytosis, cough, or dyspnea. Low   Procal on admission mildly elevated 0.26  Discussed with primary team. Low suspicion for pneumonia. However, given CT Chest findings of multifocal opacities, favor treating empirically with short course of abx (total 5 days)   [ ] Ceftriaxone 2g qd x 4 more days (end date 3/4/2024), and doxycycline 100mg BID x 5 days to cover for atypicals  [ ] Recommend adding on procalcitonin and Cell Diff to AM labs, checking procal and CBC with cell diff tomorrow AM   [ ] Recommend MRSA swab, Full Respiratory Viral Panel, Urine Strep Pneumo Antigen, Urine Legionella Antigen   - If MRSA swab negative, no need to add on Vanc    [ ] recommend aspiration precautions, elevate head of bed, speech and swallow eval.     # Chronic Atrial fibrillation - on apixaban 2.5mg BID, on metoprolol succinate 25mg qd.   # Vitamin D insufficiency - Vitamin D 25 Hydroxy of 23.5 on last admission. [ ] continue Vitamin D 50,000 units q week. transition to qd outpatient once replete.   # Sacral wound (present on admission)- recommend wound care consult     DVT ppx - already on apixaban 2.5mg BID    89 yo woman with HTN, hx of GIB, paroxysmal afib (on eliquis 2.5mg BID), HFpEF, severe MR s/p transcatheter mitral valve repair using Saulo on 07/3/19 (Clasp IID research trial), severe TR, LBP/OA, gingivitis, recent admission (Feb 20th - Feb 28th) for left hip fracture s/p intramedullary mando with ortho. Was discharged to rehab. Now presenting from rehab one day later -- per family, was complaining of shortness of breath, ?chest discomfort.     # Chronic HFpEF  # HTN   # Hx of mitral valve repair   On chart review, Outpatient Torsemide regimen was increased from 10mg to 20mg qd in Dec 2023.   Torsemide decreased from 20mg qd to 10mg qd during Feb 2024 admission due to YASH.   Was discharged to rehab on 10mg qd of torsemide  2.29.24                          --- 9941  2.24.24                          --- 4699  9.22.23                          --- 970  5.16.23                          --- 793  4.18.23                          --- 752  No LE edema, CT Chest without florid volume overload; Yet, BNP much higher than baseline.  >9x outpatient baseline.   [ ] f/u TTE  [ ] f/u structural heart recs   diuretic regimen per primary team     # Bilateral multifocal opacities.   Today, without fever, leukocytosis, cough, or dyspnea. Low   Procal on admission mildly elevated 0.26  Discussed with primary team. Low suspicion for bacterial pneumonia. However, given CT Chest findings of multifocal opacities, favor treating empirically with short course of abx (total 5 days)   [ ] Ceftriaxone 2g qd x 4 more days (end date 3/4/2024), and doxycycline 100mg BID x 5 days to cover for atypicals  [ ] Recommend adding on procalcitonin and Cell Diff to AM labs, checking procal and CBC with cell diff tomorrow AM   --Continuing ceftriaxone for now given charted penicillin allergy, and stable vitals, WBC.   Unable to get detailed penicillin allergy history as patient was sleepy s/p zyprexa. Clarify penicillin allergy history tomorrow. If Procal, or WBC uptrending tomorrow, or worsening in respiratory status, can consider broadening abx.   [ ] Recommend MRSA swab, Full Respiratory Viral Panel, Urine Strep Pneumo Antigen, Urine Legionella Antigen   - If MRSA swab negative, no need to add on Vanc    [ ] recommend aspiration precautions, elevate head of bed, speech and swallow eval.     # Chronic Atrial fibrillation - on apixaban 2.5mg BID, on metoprolol succinate 25mg qd. ; Episode of rapid afib 150bpm on March 1st 2024  # Metabolic encephalopathy - More prone to episodes of hospital delirium than at baseline. Likely 2/2 stress of   # Vitamin D insufficiency - Vitamin D 25 Hydroxy of 23.5 on last admission. [ ] continue Vitamin D 50,000 units q week. transition to qd outpatient once replete.   # Sacral wound (present on admission)- recommend wound care consult       DVT ppx - already on apixaban 2.5mg BID    89 yo woman with HTN, hx of GIB, paroxysmal afib (on eliquis 2.5mg BID), HFpEF, severe MR s/p transcatheter mitral valve repair using Saulo on 07/3/19 (Clasp IID research trial), severe TR, LBP/OA, gingivitis, recent admission (Feb 20th - Feb 28th) for left hip fracture s/p intramedullary mando with ortho. Was discharged to rehab. Now presenting from rehab one day later -- per family, was complaining of shortness of breath, ?chest discomfort.     # Chronic HFpEF  # HTN   # Hx of mitral valve repair   # Severe tricuspid regurgitation  home torsemide regimen 20mg qd   BNP Trend   2.29.24                          --- 9941  2.24.24                          --- 4699  9.22.23                          --- 970  5.16.23                          --- 793  4.18.23                          --- 752  f/u structural heart recs  diuretic regimen per primary team     [ ] consider palliative care consult if not candidate for tricuspid valve repair     # Bilateral multifocal opacities.   Today, without fever, leukocytosis, cough, or dyspnea.   Procal on admission mildly elevated 0.26  Suspect that symptoms prior to admission more likely explained by valvular disease   Discussed with primary team. Low suspicion for bacterial pneumonia. However, given CT Chest findings of multifocal opacities, favor treating empirically with short course of abx (total 5 days)   [ ] Ceftriaxone 2g qd x 4 more days (end date 3/4/2024), and doxycycline 100mg BID x 5 days to cover for atypicals  [ ] Recommend adding on procalcitonin and Cell Diff to AM labs, checking procal and CBC with cell diff tomorrow AM   --Continuing ceftriaxone for now given charted penicillin allergy, and stable vitals, WBC.   Unable to get detailed penicillin allergy history as patient was sleepy s/p zyprexa. Clarify penicillin allergy history tomorrow. If Procal, or WBC uptrending tomorrow, or worsening in respiratory status, can consider broadening abx.   [ ] Recommend MRSA swab, Full Respiratory Viral Panel, Urine Strep Pneumo Antigen, Urine Legionella Antigen   - If MRSA swab negative, no need to add on Vanc    [ ] recommend aspiration precautions, elevate head of bed, speech and swallow eval.     # Chronic Atrial fibrillation - on apixaban 2.5mg BID, on metoprolol succinate 25mg qd. ; Episode of rapid afib to 150bpm on March 1st 2024  # Metabolic encephalopathy - More prone to episodes of hospital delirium than at baseline. Likely 2/2 stress of hospitalization. Empiric IV thiamine   # Vitamin D insufficiency - Vitamin D 25 Hydroxy of 23.5 on last admission. [ ] continue Vitamin D 50,000 units q week. transition to qd outpatient once replete.   # Sacral wound (present on admission)- recommend wound care consult       DVT ppx - already on apixaban 2.5mg BID    89 yo woman with HTN, hx of GIB, paroxysmal afib (on eliquis 2.5mg BID), HFpEF, severe MR s/p transcatheter mitral valve repair using Saulo on 07/3/19 (Cl[employer] IID research trial), severe TR, LBP/OA, gingivitis, recent admission (Feb 20th - Feb 28th) for left hip fracture s/p intramedullary mando with ortho. Was discharged to rehab. Now presenting from rehab one day later -- per family, was complaining of shortness of breath, ?chest discomfort.     # Chronic HFpEF  # HTN   # Hx of mitral valve repair   # Severe tricuspid regurgitation  home torsemide regimen 20mg qd   BNP Trend   2.29.24                          --- 9941  2.24.24                          --- 4699  9.22.23                          --- 970  5.16.23                          --- 793  4.18.23                          --- 752  f/u structural heart recs  diuretic regimen per primary team     [ ] consider palliative care consult if not candidate for tricuspid valve repair     # Bilateral multifocal opacities.   Today, without fever, leukocytosis, cough, or dyspnea.   Procal on admission mildly elevated 0.26  Suspect that symptoms prior to admission more likely explained by valvular disease   Discussed with primary team. Low suspicion for bacterial pneumonia. However, given CT Chest findings of multifocal opacities, favor treating empirically with short course of abx (total 5 days)   [ ] Ceftriaxone 2g qd x 4 more days (end date 3/4/2024), and doxycycline 100mg BID x 5 days to cover for atypicals  [ ] Recommend adding on procalcitonin and Cell Diff to AM labs, checking procal and CBC with cell diff tomorrow AM   --Continuing ceftriaxone for now given charted penicillin allergy, and stable vitals, WBC.   Unable to get detailed penicillin allergy history as patient was sleepy s/p zyprexa. Clarify penicillin allergy history tomorrow. If Procal, or WBC uptrending tomorrow, or worsening in respiratory status, can consider broadening abx.   [ ] Recommend MRSA swab, Full Respiratory Viral Panel, Urine Strep Pneumo Antigen, Urine Legionella Antigen   - If MRSA swab negative, no need to add on Vanc    [ ] recommend aspiration precautions, elevate head of bed, speech and swallow eval.     # Chronic Atrial fibrillation - on apixaban 2.5mg BID, on metoprolol succinate 25mg qd. ; Episode of rapid afib to 150bpm on March 1st 2024  # Metabolic encephalopathy - More prone to episodes of hospital delirium than at baseline. Likely 2/2 stress of hospitalization. Empiric IV thiamine  [ ] bladder scan to check for urinary retention; has urine output, but may be having overflow incontinence.,   [ ] bladder scan if agitated    # Vitamin D insufficiency - Vitamin D 25 Hydroxy of 23.5 on last admission. [ ] continue Vitamin D 50,000 units q week. transition to qd outpatient once replete.   # Sacral wound (present on admission)- recommend wound care consult       DVT ppx - already on apixaban 2.5mg BID

## 2024-03-01 NOTE — PROGRESS NOTE ADULT - PROBLEM SELECTOR PLAN 2
- TTE (6/06/23): EF 58%. Mildly dilated RV with mildly reduced fxn. Severe BIAE. Severe MR (MG 4.7mmHg), YUDITH devices in stable position across A2-P2; severe TR  - TTE (03/01/24): EF 45%, mod sym LVH, mildly reduced LVSF, dilated right ventricular cavity, reduced RVSF, severe TR, mild-mod TN, mod MR, pHTN present, PASP 80/31 mmHg, no pericardial effusion. - TTE (6/06/23): EF 58%. Mildly dilated RV with mildly reduced fxn. Severe BIAE. Severe MR (MG 4.7mmHg), YUDITH devices in stable position across A2-P2; severe TR  - TTE (03/01/24): EF 45%, mod sym LVH, mildly reduced LVSF, dilated right ventricular cavity, reduced RVSF, severe TR, mild-mod MO, mod MR, pHTN present, PASP 80/31 mmHg, no pericardial effusion. s/p Two mitral valve transcatheter [id number] (YUDITH) clips noted attached to the A2-P2.    #Bilateral foot pain  - vascular consulted for b/l diminished pulses, appreciate recs

## 2024-03-02 NOTE — SWALLOW BEDSIDE ASSESSMENT ADULT - PHARYNGEAL PHASE
Suspect variably delayed swallow, hyolaryngeal movement appreciated, with intermittent clinical indicators of penetration/aspiration noted with thin liquids. Prolonged cough associated with initial sip. Potential impact to safety seemed to occur with larger sips. No clinical indicators of penetration/aspiration noted with purees and small sips of thin liquids. Multiple swallows (2-3) noted across PO. Cough persisted with thickened liquids suspect at that point cough was baseline.

## 2024-03-02 NOTE — DIETITIAN INITIAL EVALUATION ADULT - NSFNSPHYEXAMSKINFT_GEN_A_CORE
Pressure Injury 1: sacrum , Stage III  Pressure Injury 2: none, none  Pressure Injury 3: none, none  Pressure Injury 4: none, none  Pressure Injury 5: none, none  Pressure Injury 6: none, none  Pressure Injury 7: none, none  Pressure Injury 8: none, none  Pressure Injury 9: none, none  Pressure Injury 10: none, none  Pressure Injury 11: none, none, Pressure Injury 1: sacrum, Stage III  Pressure Injury 2: none, none  Pressure Injury 3: none, none  Pressure Injury 4: none, none  Pressure Injury 5: none, none  Pressure Injury 6: none, none  Pressure Injury 7: none, none  Pressure Injury 8: none, none  Pressure Injury 9: none, none  Pressure Injury 10: none, none  Pressure Injury 11: none, none

## 2024-03-02 NOTE — SWALLOW BEDSIDE ASSESSMENT ADULT - SWALLOW EVAL: DIAGNOSIS
Self-limited PO trials and required encouragement. Intermittent clinical indicators of penetration/aspiration noted with thin liquids and seemed to occur with large bolus size consumed via straw. No nidhi clinical indicators of penetration/aspiration noted with small sips. Cough persisted with trials of thickened liquids, therefore difficult to decipher whether of any benefit. No clinical indicators of penetration/aspiration noted with purees. Multiple swallows (2-3) noted that may be suggestive of pharyngeal inefficiency. Presentation c/w age and deconditioning. Recommend a modified barium swallow study to further assess swallow function if in line with plan/goals of care.

## 2024-03-02 NOTE — PROGRESS NOTE ADULT - PROBLEM SELECTOR PLAN 1
- Diuresis: near euvolemia-- give one more dose Lasix 40 IV tonight and hold in AM to assess vol status with ? transition to PO  (Home: Torsemide 20mg QD)   - GDMT: Metoprolol XL 25mg QD, Can consider Spironolactone 25mg QD for further GDMT optimization  - continue strict I&O    #Bilateral Multifocal Opacities   - appreciate Medicine co management  - Continue IV Ceftriaxone 2g Q24 (last dose 03/04/24) and PO Doxycycline 100 mg BID x 5 days (03/01-03/05) for empiric coverage  - MRSA swab neg, RVP neg, Urine Strep Pneumo Antigen, Urine Legionella Antigen - Diuresis: near euvolemia-- give one more dose Lasix 40 IV tonight and hold in AM to assess vol status with ? transition to PO  (Home: Torsemide 20mg QD)   - GDMT: Metoprolol XL 25mg QD, Can consider Spironolactone 25mg QD for further GDMT optimization  - continue strict I&O    #Bilateral Multifocal Opacities   - appreciate Medicine co management  - Continue IV Ceftriaxone 2g Q24 (last dose 03/04/24) and PO Doxycycline 100 mg BID x 5 days (03/01-03/05) for empiric coverage  - MRSA swab neg, RVP neg, f/u Urine Strep Pneumo Antigen, Urine Legionella Antigen negative

## 2024-03-02 NOTE — PROGRESS NOTE ADULT - ASSESSMENT
89 yo woman with HTN, hx of GIB, paroxysmal afib (on eliquis 2.5mg BID), HFpEF, severe MR s/p transcatheter mitral valve repair using Saulo on 07/3/19 (Clasp IID research trial), severe TR, LBP/OA, gingivitis, recent admission (Feb 20th - Feb 28th) for left hip fracture s/p intramedullary mando with ortho. Was discharged to rehab. Now presenting from rehab one day later -- per family, was complaining of shortness of breath, ?chest discomfort.     # Chronic HFpEF  # HTN   # Hx of mitral valve repair   # Severe tricuspid regurgitation  home torsemide regimen 20mg qd   BNP Trend   2.29.24                          --- 9941  2.24.24                          --- 4699  9.22.23                          --- 970  5.16.23                          --- 793  4.18.23                          --- 752  f/u structural heart recs  diuretic regimen per primary team     [ ] consider palliative care consult if not candidate for tricuspid valve repair     # Bilateral multifocal opacities 2/2 possible multifocal pneumonia   Discussed with primary team. Low suspicion for bacterial pneumonia. However, given CT Chest findings of multifocal opacities, favor treating empirically with short course of abx (total 5 days). Serial procalcitonin is lateral.   [ ] Ceftriaxone 2g qd (end date 3/4/2024), and doxycycline 100mg BID x 5 days to cover for atypicals  [ ] recommend aspiration precautions, elevate head of bed, speech and swallow eval.     # Chronic Atrial fibrillation - on apixaban 2.5mg BID, on metoprolol succinate 25mg qd. ; Episode of rapid afib to 150bpm on March 1st 2024  # Metabolic encephalopathy - More prone to episodes of hospital delirium than at baseline. Likely 2/2 stress of hospitalization. Empiric IV thiamine  [ ] Please obtain bladder scan to check for urinary retention lenny given agitation overnight; has urine output, but may be having overflow incontinence     # Vitamin D insufficiency - Vitamin D 25 Hydroxy of 23.5 on last admission. [ ] continue Vitamin D 50,000 units q week. transition to qd outpatient once replete.   # Sacral wound (present on admission)- recommend wound care consult       DVT ppx - already on apixaban 2.5mg BID

## 2024-03-02 NOTE — DIETITIAN INITIAL EVALUATION ADULT - ADD RECOMMEND
1. Continue with current diet order (minced and moist diet)  >>Boost pudding oral nutrition supplement 2x/day (230 calories, 7g protein per serving)  2. Encourage pt to meet nutritional needs as able  3. Monitor PO intakes, trend weights (weekly), monitor skin integrity, monitor labs (electrolytes, CMP), monitor GI function  4. Encourage adherence to diet education (reinforce as able)  5. Continue ergocalciferol, thiamine  6. Pain and bowel regimen per team  7. Will continue to assess/honor preferences as able  8. Align nutrition interventions with goals of care at all times

## 2024-03-02 NOTE — DIETITIAN INITIAL EVALUATION ADULT - OTHER CALCULATIONS
Based on Standards of Care pt within % ideal body weight, thus actual body weight used for all calculations. Needs adjusted for advanced age, illness/infection, pressure ulcer, pt's condition/clinical status.

## 2024-03-02 NOTE — PROGRESS NOTE ADULT - PROBLEM SELECTOR PLAN 4
- Meds as above     - Fluids: not indicated  - Replete Lytes PRN to K>4, Mg>2  - Diet: minced w/ aspiration precautions  - DVT ppx: Eliquis   - SW/Dispo: Medically active

## 2024-03-02 NOTE — DIETITIAN INITIAL EVALUATION ADULT - SIGNS/SYMPTOMS
as evidenced by <50% PO intakes since admission per nursing staff and on observation as evidenced by multifocal pneumonia on admission, stage III pressure ulcer

## 2024-03-02 NOTE — PROGRESS NOTE ADULT - SUBJECTIVE AND OBJECTIVE BOX
Cardiology PA Adult Progress Note    Subjective Assessment: Patient seen and examined by bedside this AM. Sundowning episodes occurred yesterday evening where patient needed IV Zyprexa and Haldol. No episodes thus far today. Patient appears in good spirits this am denying any sx: CP, SOB, LE swelling, dysuria, HA, N/V/D, fever, chills.     ROS negative except as noted above.  	  MEDICATIONS:  furosemide   Injectable 40 milliGRAM(s) IV Push two times a day  metoprolol succinate ER 25 milliGRAM(s) Oral daily  cefTRIAXone   IVPB 1000 milliGRAM(s) IV Intermittent every 24 hours  doxycycline monohydrate Capsule 100 milliGRAM(s) Oral every 12 hours  buDESOnide    Inhalation Suspension 0.5 milliGRAM(s) Inhalation every 12 hours PRN  ipratropium    for Nebulization 500 MICROGram(s) Nebulizer every 6 hours PRN  acetaminophen     Tablet .. 650 milliGRAM(s) Oral every 6 hours PRN  OLANZapine 5 milliGRAM(s) Oral at bedtime  OLANZapine Injectable 2.5 milliGRAM(s) IntraMuscular every 4 hours PRN  polyethylene glycol 3350 17 Gram(s) Oral every 12 hours  senna 2 Tablet(s) Oral at bedtime  apixaban 2.5 milliGRAM(s) Oral every 12 hours  ergocalciferol 66532 Unit(s) Oral <User Schedule>  latanoprost 0.005% Ophthalmic Solution 1 Drop(s) Both EYES at bedtime  thiamine IVPB 500 milliGRAM(s) IV Intermittent daily      [PHYSICAL EXAM:  TELEMETRY:  T(C): 36.7 (03-02-24 @ 15:35), Max: 36.7 (03-02-24 @ 15:35)  HR: 76 (03-02-24 @ 15:35) (69 - 150)  BP: 123/87 (03-02-24 @ 15:35) (100/61 - 130/84)  RR: 17 (03-02-24 @ 15:35) (17 - 18)  SpO2: 100% (03-02-24 @ 15:35) (97% - 100%)  Wt(kg): --  I&O's Summary    01 Mar 2024 07:01  -  02 Mar 2024 07:00  --------------------------------------------------------  IN: 150 mL / OUT: 400 mL / NET: -250 mL    02 Mar 2024 07:01  -  02 Mar 2024 17:53  --------------------------------------------------------  IN: 525 mL / OUT: 1200 mL / NET: -675 m    Appearance: Normal	  HEENT:   Normal oral mucosa, PERRLA, EOMI	  Neck: Supple, + JVD;   Cardiovascular: Normal S1 S2, No JVD, No murmurs,   Respiratory: Lungs clear to auscultation b/l   Gastrointestinal:  Soft, Non-tender, + BS	  Skin: No rashes, No ecchymoses, No cyanosis  Extremities: Normal range of motion, No clubbing, cyanosis or edema  Vascular: Peripheral pulses palpable 2+ bilaterally  Neurologic: Non-focal  Psychiatry: A & O x 3, Mood & affect appropriate      	    ECG:  	  RADIOLOGY:   DIAGNOSTIC TESTING:  [ ] Echocardiogram:   [ ]  Catheterization:  [ ] Stress Test:    [ ] MESFIN  OTHER: 	    LABS:	 	  CARDIAC MARKERS:                            16.1   6.33  )-----------( 181      ( 02 Mar 2024 10:04 )             49.5     03-02    144  |  105  |  24<H>  ----------------------------<  107<H>  4.3   |  28  |  1.08    Ca    9.4      02 Mar 2024 17:03  Mg     2.3     03-02    TPro  5.9<L>  /  Alb  3.1<L>  /  TBili  0.6  /  DBili  x   /  AST  70<H>  /  ALT  34  /  AlkPhos  121<H>  03-02    proBNP:   Lipid Profile:   HgA1c:   TSH:   PT/INR - ( 01 Mar 2024 05:30 )   PT: 15.5 sec;   INR: 1.37          PTT - ( 01 Mar 2024 05:30 )  PTT:35.4 sec

## 2024-03-02 NOTE — DIETITIAN INITIAL EVALUATION ADULT - PERTINENT LABORATORY DATA
03-02    141  |  106  |  28<H>  ----------------------------<  97  4.1   |  25  |  1.09    Ca    9.4      02 Mar 2024 10:04  Mg     2.3     03-02    TPro  6.6  /  Alb  3.2<L>  /  TBili  0.8  /  DBili  x   /  AST  60<H>  /  ALT  30  /  AlkPhos  128<H>  03-02

## 2024-03-02 NOTE — SWALLOW BEDSIDE ASSESSMENT ADULT - DIET PRIOR TO ADMI
unknown Olumiant Pregnancy And Lactation Text: Based on animal studies, Olumiant may cause embryo-fetal harm when administered to pregnant women.  The medication should not be used in pregnancy.  Breastfeeding is not recommended during treatment.

## 2024-03-02 NOTE — SWALLOW BEDSIDE ASSESSMENT ADULT - SLP PERTINENT HISTORY OF CURRENT PROBLEM
PMHx of HTN, hx of GIB, paroxysmal Afib, HFpEF, severe MR s/p transcatheter mitral valve repair using Saulo on 07/3/19, severe TR, LBP/OA, gingivitis, recent admission (Feb 20th - Feb 28th) for left hip fracture s/p intramedullary mando with ortho. Patient was discharged to rehab. Now presented from rehab one day later- per family, was complaining of shortness of breath, ?chest discomfort. Admitted to [facility] on 2/29/24.

## 2024-03-02 NOTE — CHART NOTE - NSCHARTNOTEFT_GEN_A_CORE
Team was notified by nurse at change of shift of pt experiencing Afib RVR with HR up to 150s in setting of agitation.     Upon assessment, pt Team was notified by nurse at change of shift of pt experiencing Afib RVR with HR up to 150s in setting of agitation.     Upon assessment, pt was AO x2 and agitation likely iso of sun-downing. Re-direction was attempted, but pt continues to be both verbally and physically combative to staff with claims stating that we are "harming her" in addition to putting emphasis that she wants "to go home".     Pt was therefore given Zyprexa 2.5mg IV x2 and Haldol 2mg IV x1 in addition to 1:1 sitter at bedside.

## 2024-03-02 NOTE — PROGRESS NOTE ADULT - ASSESSMENT
89F DNR/DNI with hx of HTN, pAfib (on Eliquis), HFpEF (EF 55-60%, 6/2023 ->EF 45%), severe MR s/p Saulo transcatheter MV repair ([facility], 7/03/19), OA, and recent hospitalization 2/21-2/28/24 for mechanical fall with course c/b non-displaced Lt great trochanter proximal femur fx s/p ORIF and was discharged to [facility], presented w/ SOB and worsening cough c/f volume overload and multifocal opacities concerning for PNA, started on IV Abx and currently being diuresed with Lasix 40 IV BID reaching near euvolemia with possible transition to PO 3/3/24 vs 3/4/24?      89F DNR/DNI with hx of HTN, pAfib (on Eliquis), HFpEF (EF 55-60%, 6/2023 -->EF 45%), severe MR s/p Saulo transcatheter MV repair ([facility], 7/03/19), OA, and recent hospitalization 2/21-2/28/24 for mechanical fall with course c/b non-displaced Lt great trochanter proximal femur fx s/p ORIF and was discharged to [facility], presented w/ SOB and worsening cough c/f volume overload and multifocal opacities concerning for PNA, started on IV Abx and currently being diuresed with Lasix 40 IV BID reaching near euvolemia with possible transition to PO 3/3/24 vs 3/4/24?

## 2024-03-02 NOTE — DIETITIAN INITIAL EVALUATION ADULT - NUTRITIONGOAL OUTCOME1
Pt to consistently meet at least 75% of EEE via tolerated route that is consistent with goals of care and will exhibit adequate wound healing during hospital stay;

## 2024-03-02 NOTE — SWALLOW BEDSIDE ASSESSMENT ADULT - SLP GENERAL OBSERVATIONS
Patient was seen fully awake and alert, HOB fully elevated, on room air. Patient oriented to self, declined to answer remaining questions. Patient followed simple directives and communicated wants/needs. Severe strained and soft vocal quality.

## 2024-03-02 NOTE — PROGRESS NOTE ADULT - SUBJECTIVE AND OBJECTIVE BOX
INTERVAL EVENTS: agitated overnight, in rapid afib    PAST MEDICAL & SURGICAL HISTORY:  Diastolic heart failure    Mitral regurgitation    Atrial fibrillation    HTN (hypertension)    H/O exploratory laparotomy    H/O total hysterectomy    History of open reduction and internal fixation (ORIF) procedure        MEDICATIONS  (STANDING):  apixaban 2.5 milliGRAM(s) Oral every 12 hours  cefTRIAXone   IVPB 1000 milliGRAM(s) IV Intermittent every 24 hours  doxycycline monohydrate Capsule 100 milliGRAM(s) Oral every 12 hours  ergocalciferol 38636 Unit(s) Oral <User Schedule>  furosemide   Injectable 40 milliGRAM(s) IV Push two times a day  latanoprost 0.005% Ophthalmic Solution 1 Drop(s) Both EYES at bedtime  metoprolol succinate ER 25 milliGRAM(s) Oral daily  OLANZapine 5 milliGRAM(s) Oral at bedtime  polyethylene glycol 3350 17 Gram(s) Oral every 12 hours  senna 2 Tablet(s) Oral at bedtime  thiamine IVPB 500 milliGRAM(s) IV Intermittent daily  torsemide 20 milliGRAM(s) Oral once    MEDICATIONS  (PRN):  acetaminophen     Tablet .. 650 milliGRAM(s) Oral every 6 hours PRN Moderate Pain (4 - 6)  buDESOnide    Inhalation Suspension 0.5 milliGRAM(s) Inhalation every 12 hours PRN Shortness of Breath  ipratropium    for Nebulization 500 MICROGram(s) Nebulizer every 6 hours PRN Shortness of Breath  OLANZapine Injectable 2.5 milliGRAM(s) IntraMuscular every 4 hours PRN aggitation    T(F): 97.4 (03-02-24 @ 11:58), Max: 97.7 (03-01-24 @ 17:35)  HR: 69 (03-02-24 @ 11:58) (69 - 150)  BP: 130/84 (03-02-24 @ 11:58) (100/61 - 147/88)  BP(mean): 99 (03-02-24 @ 11:58) (74 - 112)  ABP: --  ABP(mean): --  RR: 17 (03-02-24 @ 11:58) (16 - 18)  SpO2: 98% (03-02-24 @ 11:58) (97% - 98%)    I/O Detail 24H    01 Mar 2024 07:01  -  02 Mar 2024 07:00  --------------------------------------------------------  IN:    IV PiggyBack: 100 mL    IV PiggyBack: 50 mL  Total IN: 150 mL    OUT:    Voided (mL): 400 mL  Total OUT: 400 mL    Total NET: -250 mL      02 Mar 2024 07:01  -  02 Mar 2024 12:10  --------------------------------------------------------  IN:    IV PiggyBack: 100 mL    Oral Fluid: 180 mL  Total IN: 280 mL    OUT:    Voided (mL): 850 mL  Total OUT: 850 mL    Total NET: -570 mL          PHYSICAL EXAM:  GEN: NAD  HEENT: EOMI   RESP: CTA b/l  CV: RRR. Normal S1/S2. No m/r/g.  ABD: soft, non-distended  EXT: No edema   NEURO: alert but not entirely oriented     LABS:  CBC 03-02-24 @ 10:04                        16.1   6.33  )-----------( 181                   49.5       Hgb trend: 16.1 <-- , 14.7 <-- , 16.5 <--   WBC trend: 6.33 <-- , 6.82 <-- , 6.27 <--       CMP 03-02-24 @ 10:04    141  |  106  |  28<H>  ----------------------------<  97  4.1   |  25  |  1.09    Ca    9.4      03-02-24 @ 10:04  Mg     2.3     03-02    TPro  6.6  /  Alb  3.2<L>  /  TBili  0.8  /  DBili  x   /  AST  60<H>  /  ALT  30  /  AlkPhos  128<H>  03-02      Serum Cr trend: 1.09 <-- , 1.11 <-- , 1.10 <-- , 1.07 <--     PT/INR - ( 01 Mar 2024 05:30 )   PT: 15.5 sec;   INR: 1.37          PTT - ( 01 Mar 2024 05:30 ):35.4 sec    Cardiac Markers   02-29-24 @ 19:56: HS Trop T 111<HH> / CK x     / CKMB x      02-29-24 @ 16:32: HS Trop T 116<HH> / CK x     / CKMB x              STUDIES:

## 2024-03-02 NOTE — PROGRESS NOTE ADULT - PROBLEM SELECTOR PLAN 2
- TTE (6/06/23): EF 58%. Mildly dilated RV with mildly reduced fxn. Severe BIAE. Severe MR (MG 4.7mmHg), YUDITH devices in stable position across A2-P2; severe TR  - TTE (03/01/24): EF 45%, mod sym LVH, mildly reduced LVSF, dilated right ventricular cavity, reduced RVSF, severe TR, mild-mod TN, mod MR, pHTN present, PASP 80/31 mmHg, no pericardial effusion. s/p Two mitral valve transcatheter mhao-rs-gsol (YUDITH) clips noted attached to the A2-P2.    #Bilateral foot pain  - vascular consulted for b/l diminished pulses, appreciate recs

## 2024-03-02 NOTE — SWALLOW BEDSIDE ASSESSMENT ADULT - SWALLOW EVAL: RECOMMENDED FEEDING/EATING TECHNIQUES
NO STRAWS/crush medication (when feasible)/hard swallow w/ each bite or sip/maintain upright posture during/after eating for 30 mins/no straws/oral hygiene/position upright (90 degrees)

## 2024-03-02 NOTE — DIETITIAN INITIAL EVALUATION ADULT - PERTINENT MEDS FT
MEDICATIONS  (STANDING):  apixaban 2.5 milliGRAM(s) Oral every 12 hours  cefTRIAXone   IVPB 1000 milliGRAM(s) IV Intermittent every 24 hours  doxycycline monohydrate Capsule 100 milliGRAM(s) Oral every 12 hours  ergocalciferol 93107 Unit(s) Oral <User Schedule>  furosemide   Injectable 40 milliGRAM(s) IV Push two times a day  latanoprost 0.005% Ophthalmic Solution 1 Drop(s) Both EYES at bedtime  metoprolol succinate ER 25 milliGRAM(s) Oral daily  OLANZapine 5 milliGRAM(s) Oral at bedtime  polyethylene glycol 3350 17 Gram(s) Oral every 12 hours  senna 2 Tablet(s) Oral at bedtime  thiamine IVPB 500 milliGRAM(s) IV Intermittent daily    MEDICATIONS  (PRN):  acetaminophen     Tablet .. 650 milliGRAM(s) Oral every 6 hours PRN Moderate Pain (4 - 6)  buDESOnide    Inhalation Suspension 0.5 milliGRAM(s) Inhalation every 12 hours PRN Shortness of Breath  ipratropium    for Nebulization 500 MICROGram(s) Nebulizer every 6 hours PRN Shortness of Breath  OLANZapine Injectable 2.5 milliGRAM(s) IntraMuscular every 4 hours PRN aggitation

## 2024-03-03 NOTE — PROGRESS NOTE ADULT - PROBLEM SELECTOR PLAN 2
- TTE (03/01/24): EF 45%, mod sym LVH, mildly reduced LVSF, dilated right ventricular cavity, reduced RVSF, severe TR, mild-mod [place], mod MR, pHTN present, PASP 80/31 mmHg, no pericardial effusion. s/p Two mitral valve transcatheter jaak-sy-ggax (YUDITH) clips noted attached to the A2-P2.  -CTSX consult not indicated 2/2 age and code status making patient not a surgical candidate     #Bilateral foot pain  - vascular consulted for b/l diminished pulses--> No evidence of a vascular pathology with palpable pulses on exam   - vascular team signed off

## 2024-03-03 NOTE — PROGRESS NOTE ADULT - PROBLEM SELECTOR PLAN 1
- Diuresis: near euvolemia-- give one more dose Lasix 40 IV today and start home Torsemide 10mg PO QD on 3/4/24  - GDMT: Metoprolol XL 25mg QD, Can consider Spironolactone 25mg QD for further GDMT optimization (check BP in AM).   - continue strict I&O     #Bilateral Multifocal Opacities   - appreciate Medicine co management  - Continue IV Ceftriaxone 2g Q24 (last dose 03/04/24) and PO Doxycycline 100 mg BID x 5 days (03/01-03/05) for empiric coverage  - MRSA swab neg, RVP neg, Urine Strep Pneumo Antigen negative, Urine Legionella Antigen negative

## 2024-03-03 NOTE — PROVIDER CONTACT NOTE (OTHER) - ASSESSMENT
No complaints, A&Ox2 disoriented to time and situation in AM when accepted bladder scan, refused straight catherization. Bladder scan showed 800mL.

## 2024-03-03 NOTE — PROGRESS NOTE ADULT - ASSESSMENT
89 yo woman with HTN, hx of GIB, paroxysmal afib (on eliquis 2.5mg BID), HFpEF, severe MR s/p transcatheter mitral valve repair using Saulo on 07/3/19 ([employer] IID research trial), severe TR, LBP/OA, gingivitis, recent admission (Feb 20th - Feb 28th) for left hip fracture s/p intramedullary mando with ortho. Was discharged to rehab. Now presenting from rehab one day later -- per family, was complaining of shortness of breath, ?chest discomfort.     # Chronic HFpEF  # HTN   # Hx of mitral valve repair   # Severe tricuspid regurgitation  home torsemide regimen 20mg qd   BNP Trend   2.29.24                          --- 9941  2.24.24                          --- 4699  9.22.23                          --- 970  5.16.23                          --- 793  4.18.23                          --- 752  f/u structural heart recs  diuretic regimen per primary team     [ ] consider palliative care consult if not candidate for tricuspid valve repair     # Bilateral multifocal opacities 2/2 possible multifocal pneumonia   Discussed with primary team. Low suspicion for bacterial pneumonia. However, given CT Chest findings of multifocal opacities, favor treating empirically with short course of abx (total 5 days). Serial procalcitonin is lateral.   [ ] Ceftriaxone 2g qd (end date 3/4/2024), and doxycycline 100mg BID x 5 days to cover for atypicals  [ ] recommend aspiration precautions, elevate head of bed, speech and swallow eval.     # Chronic Atrial fibrillation - on apixaban 2.5mg BID, on metoprolol succinate 25mg qd. ; Episode of rapid afib to 150bpm on March 1st 2024  # Metabolic encephalopathy 2/2 hospital delerium and urinary retention   - Bladder scans q6H    # Vitamin D insufficiency - Vitamin D 25 Hydroxy of 23.5 on last admission. [ ] continue Vitamin D 50,000 units q week. transition to qd outpatient once replete.   # Sacral wound (present on admission)- recommend wound care consult       DVT ppx - already on apixaban 2.5mg BID

## 2024-03-03 NOTE — PHYSICAL THERAPY INITIAL EVALUATION ADULT - PERTINENT HX OF CURRENT PROBLEM, REHAB EVAL
89F DNR/DNI with hx of HTN, pAfib (on Eliquis), HFpEF (EF 55-60%, 6/2023 -->EF 45%), severe MR s/p Saulo transcatheter MV repair ([facility], 7/03/19), OA, and recent hospitalization 2/21-2/28/24 for mechanical fall with course c/b non-displaced Lt great trochanter proximal femur fx s/p ORIF and was discharged to [facility] [place], presented w/ SOB and worsening cough c/f volume overload and multifocal opacities concerning for PNA, started on IV Abx and currently being diuresed with Lasix 40 IV BID reaching near euvolemia with possible transition to 3/4/24.

## 2024-03-03 NOTE — PROGRESS NOTE ADULT - PROBLEM SELECTOR PLAN 4
- Meds as above     - Fluids: not indicated  - Replete Lytes PRN to K>4, Mg>2  - Diet: minced w/ aspiration precautions  - DVT ppx: Eliquis   - SW/Dispo: [facility] will accept patient back at anytime

## 2024-03-03 NOTE — PHYSICAL THERAPY INITIAL EVALUATION ADULT - GAIT DEVIATIONS NOTED, PT EVAL
decreased dominguez/decreased velocity of limb motion/decreased step length/decreased weight-shifting ability

## 2024-03-03 NOTE — PROGRESS NOTE ADULT - ASSESSMENT
89F DNR/DNI with hx of HTN, pAfib (on Eliquis), HFpEF (EF 55-60%, 6/2023 -->EF 45%), severe MR s/p Saulo transcatheter MV repair ([facility], 7/03/19), OA, and recent hospitalization 2/21-2/28/24 for mechanical fall with course c/b non-displaced Lt great trochanter proximal femur fx s/p ORIF and was discharged to [facility], presented w/ SOB and worsening cough c/f volume overload and multifocal opacities concerning for PNA, started on IV Abx and currently being diuresed with Lasix 40 IV BID reaching near euvolemia with possible transition to 3/4/24.      89F DNR/DNI with hx of HTN, pAfib (on Eliquis), HFpEF (EF 55-60%, 6/2023 -->EF 45%), severe MR s/p Saulo transcatheter MV repair ([facility], 7/03/19), OA, and recent hospitalization 2/21-2/28/24 for mechanical fall with course c/b non-displaced Lt great trochanter proximal femur fx s/p ORIF and was discharged to [facility], presented w/ SOB and worsening cough c/f volume overload and multifocal opacities concerning for PNA, started on IV Abx and currently being diuresed with Lasix 40 IV BID reaching near euvolemia with transition to PO Torsemide 3/4/24. SW reports patient is accepted at [facility].

## 2024-03-03 NOTE — PROVIDER CONTACT NOTE (OTHER) - SITUATION
89 year old confused pt DNR/DNI voided zero output o/n, refused bladder scans throughout night as she sundowned. Pt encouraged to void o/n.

## 2024-03-03 NOTE — PROGRESS NOTE ADULT - SUBJECTIVE AND OBJECTIVE BOX
INTERVAL EVENTS: no acute events or complaints     PAST MEDICAL & SURGICAL HISTORY:  Diastolic heart failure    Mitral regurgitation    Atrial fibrillation    HTN (hypertension)    H/O exploratory laparotomy    H/O total hysterectomy    History of open reduction and internal fixation (ORIF) procedure        MEDICATIONS  (STANDING):  apixaban 2.5 milliGRAM(s) Oral every 12 hours  cefTRIAXone   IVPB 1000 milliGRAM(s) IV Intermittent every 24 hours  doxycycline monohydrate Capsule 100 milliGRAM(s) Oral every 12 hours  ergocalciferol 72717 Unit(s) Oral <User Schedule>  furosemide   Injectable 20 milliGRAM(s) IV Push once  latanoprost 0.005% Ophthalmic Solution 1 Drop(s) Both EYES at bedtime  metoprolol succinate ER 25 milliGRAM(s) Oral daily  OLANZapine 5 milliGRAM(s) Oral at bedtime  polyethylene glycol 3350 17 Gram(s) Oral every 12 hours  senna 2 Tablet(s) Oral at bedtime  thiamine IVPB 500 milliGRAM(s) IV Intermittent daily    MEDICATIONS  (PRN):  acetaminophen     Tablet .. 650 milliGRAM(s) Oral every 6 hours PRN Moderate Pain (4 - 6)  buDESOnide    Inhalation Suspension 0.5 milliGRAM(s) Inhalation every 12 hours PRN Shortness of Breath  ipratropium    for Nebulization 500 MICROGram(s) Nebulizer every 6 hours PRN Shortness of Breath  OLANZapine Injectable 2.5 milliGRAM(s) IntraMuscular every 4 hours PRN aggitation    T(F): 97.5 (03-03-24 @ 08:20), Max: 98 (03-02-24 @ 15:35)  HR: 98 (03-03-24 @ 11:26) (69 - 117)  BP: 110/84 (03-03-24 @ 11:26) (96/54 - 131/95)  BP(mean): 93 (03-03-24 @ 05:40) (77 - 99)  ABP: --  ABP(mean): --  RR: 18 (03-03-24 @ 11:26) (17 - 18)  SpO2: 98% (03-03-24 @ 11:26) (96% - 100%)    I/O Detail 24H    02 Mar 2024 07:01  -  03 Mar 2024 07:00  --------------------------------------------------------  IN:    IV PiggyBack: 100 mL    IV PiggyBack: 50 mL    Oral Fluid: 855 mL  Total IN: 1005 mL    OUT:    Voided (mL): 1200 mL  Total OUT: 1200 mL    Total NET: -195 mL      03 Mar 2024 07:01  -  03 Mar 2024 11:31  --------------------------------------------------------  IN:  Total IN: 0 mL    OUT:    Voided (mL): 500 mL  Total OUT: 500 mL    Total NET: -500 mL          PHYSICAL EXAM:  GEN: NAD  HEENT: EOMI   RESP: CTA b/l  CV: RRR. Normal S1/S2. No m/r/g.  ABD: soft, non-distended  EXT: No edema   NEURO: alert but not entirely oriented     LABS:  CBC 03-03-24 @ 05:30                        15.2   7.51  )-----------( 235                   46.5       Hgb trend: 15.2 <-- , 16.1 <-- , 14.7 <-- , 16.5 <--   WBC trend: 7.51 <-- , 6.33 <-- , 6.82 <-- , 6.27 <--       CMP 03-03-24 @ 05:30    146<H>  |  107  |  28<H>  ----------------------------<  101<H>  3.9   |  25  |  1.28    Ca    9.4      03-03-24 @ 05:30  Mg     1.9     03-03    TPro  5.9<L>  /  Alb  3.1<L>  /  TBili  0.6  /  DBili  x   /  AST  67<H>  /  ALT  34  /  AlkPhos  123<H>  03-03      Serum Cr trend: 1.28 <-- , 1.08 <-- , 1.09 <-- , 1.11 <-- , 1.10 <-- , 1.07 <--         Cardiac Markers           STUDIES:

## 2024-03-03 NOTE — PHYSICAL THERAPY INITIAL EVALUATION ADULT - ADDITIONAL COMMENTS
Pt is a poor historian, presents confused. States she came to the hospital from home and was independent no AD and no PT services. However per chart, pt recently in subacute rehab following recent hospitalization 2/21-2/28/24 for mechanical fall with course c/b non-displaced Lt great trochanter proximal femur fx s/p ORIR.

## 2024-03-04 NOTE — DISCHARGE NOTE PROVIDER - NSDCMRMEDTOKEN_GEN_ALL_CORE_FT
acetaminophen 325 mg oral tablet: 3 tab(s) orally every 6 hours  Alpha Lipoic Acid: Alpha Lipoic Acid 600mg daily  Eliquis 2.5 mg oral tablet: 1 tab(s) orally every 12 hours   Entresto 24 mg-26 mg oral tablet: 1 tab(s) orally 2 times a day  ergocalciferol 1.25 mg (50,000 intl units) oral capsule: 1 cap(s) orally once a week  Farxiga 10 mg oral tablet: 1 tab(s) orally once a day  Lumigan 0.01% ophthalmic solution: 1 drop(s) to each affected eye once a day (in the evening)  Metoprolol Succinate ER 25 mg oral tablet, extended release: 1 tab(s) orally once a day  polyethylene glycol 3350 oral powder for reconstitution: 17 gram(s) orally every 12 hours  senna leaf extract oral tablet: 2 tab(s) orally once a day (at bedtime)  torsemide 10 mg oral tablet: 1 tab(s) orally once a day   acetaminophen 325 mg oral tablet: 3 tab(s) orally every 6 hours as needed for  moderate pain For foot pain  Alpha Lipoic Acid: Alpha Lipoic Acid 600mg daily  doxycycline monohydrate 50 mg oral capsule: 2 cap(s) orally every 12 hours FINAL DOSE ON 3/5/24 PM  Eliquis 2.5 mg oral tablet: 1 tab(s) orally every 12 hours   ergocalciferol 1.25 mg (50,000 intl units) oral capsule: 1 cap(s) orally once a week  Lumigan 0.01% ophthalmic solution: 1 drop(s) to each affected eye once a day (in the evening)  metoprolol succinate 25 mg oral tablet, extended release: 1 tab(s) orally once a day  polyethylene glycol 3350 oral powder for reconstitution: 17 gram(s) orally every 12 hours  senna leaf extract oral tablet: 2 tab(s) orally once a day (at bedtime)  torsemide 10 mg oral tablet: 1 tab(s) orally once a day  ZyPREXA 5 mg oral tablet: 0.5 tab(s) orally once a day (at bedtime)   doxycycline monohydrate 50 mg oral capsule: 2 cap(s) orally every 12 hours FINAL DOSE ON 3/5/24 PM  Eliquis 2.5 mg oral tablet: 1 tab(s) orally every 12 hours   ergocalciferol 1.25 mg (50,000 intl units) oral capsule: 1 cap(s) orally once a week  Lumigan 0.01% ophthalmic solution: 1 drop(s) to each affected eye once a day (in the evening)  metoprolol succinate 25 mg oral tablet, extended release: 1 tab(s) orally once a day  torsemide 10 mg oral tablet: 1 tab(s) orally once a day  ZyPREXA 5 mg oral tablet: 0.5 tab(s) orally once a day (at bedtime)

## 2024-03-04 NOTE — DISCHARGE NOTE PROVIDER - NSDCCPCAREPLAN_GEN_ALL_CORE_FT
PRINCIPAL DISCHARGE DIAGNOSIS  Diagnosis: Multifocal pneumonia  Assessment and Plan of Treatment: You came to the hospital and was diagnosed with a pneumonia. The cause of your pneumonia could have been secondary to aspiration. You completed a course of IV antibiotics and have three remaining doses of the oral antibiotic called Doxycycline. Please take Doxycycline 100mg TWICE DAILY with a final dose on 3/5/24.   Please maintain aspiration precautions by appreciating the following instructions: maintain a diet of MINCED AND MOIST food ONLY, please take small bites of food and swallow COMPLETELY prior to going for another bite of food, avoid drinking liquis with a straw, keep the head of your head elevated at 90 degrees while eating and for 30 minutes after eating. Have someone at the bedside while during every meal.   Please consider a speech and swallow evaluation in the outpatient setting for the near future.         SECONDARY DISCHARGE DIAGNOSES  Diagnosis: (HFpEF) heart failure with preserved ejection fraction  Assessment and Plan of Treatment: You have a weak heart, also known as Congestive Heart Failure (CHF). Heart failure is a condition in which the heart does not pump or fill with blood well. As a result, the heart lags behind in its job of moving blood throughout the body. This can lead to symptoms such as swelling, trouble breathing, and feeling tired. Your Ejection Fraction (EF) is 45%, a normal EF is 55-60%.  -Please continue Torsemide 10mg by mouth daily to prevent fluid build up in the body.  -Avoid drinking more than 1.5L of fluid daily and maintain a low salt diet (max 2grams daily).  -Please weigh yourself daily, for any significant increases in daily weight of 2lbs/day or 5lbs/week with associated swelling in the legs or abdomen and/or shortness of breath, please call your doctor or go to the emergency room.  -Follow up with Dr. Ruiz in 1 week.      Diagnosis: Chronic atrial fibrillation  Assessment and Plan of Treatment: You have an abnormal heart rhythm (arrhythmia) called atrial fibrillation. With this condition, the hearts 2 upper chambers (the atria) quiver rather than squeeze the blood out in a normal pattern. This leads to an irregular and sometimes rapid heartbeat. Atrial fibrillation is serious condition as it affects the heart’s ability to fill with blood as it should and blood clots may form, which increases the risk for stroke.  -Please CONTINUE  ELIQUIS 2.5MG TWICE A DAY to prevent a stroke.  -Please CONTINUE METOPROLOL 25mg DAILY to keep your heart in normal rhythm.   -Please follow up with Dr. Ruiz.        Diagnosis: Mitral regurgitation  Assessment and Plan of Treatment: Mitral regurgitation (MR), which is also known as mitral insufficiency, is a common heart valve disorder. When MR is present, blood leaks backwards through the mitral valve when the heart contracts. This reduces the amount of blood that is pumped out to the body.  The primary team during admission     PRINCIPAL DISCHARGE DIAGNOSIS  Diagnosis: Multifocal pneumonia  Assessment and Plan of Treatment: You came to the hospital and was diagnosed with a pneumonia. The cause of your pneumonia could have been secondary to aspiration. You completed a course of IV antibiotics and have three remaining doses of the oral antibiotic called Doxycycline. Please take Doxycycline 100mg TWICE DAILY with a final dose on 3/5/24.   Please maintain aspiration precautions by appreciating the following instructions: maintain a diet of MINCED AND MOIST food ONLY, please take small bites of food and swallow COMPLETELY prior to going for another bite of food, avoid drinking liquis with a straw, keep the head of your head elevated at 90 degrees while eating and for 30 minutes after eating. Have someone at the bedside while during every meal.   Please consider a speech and swallow evaluation in the outpatient setting for the near future.         SECONDARY DISCHARGE DIAGNOSES  Diagnosis: (HFpEF) heart failure with preserved ejection fraction  Assessment and Plan of Treatment: You have a weak heart, also known as Congestive Heart Failure (CHF). Heart failure is a condition in which the heart does not pump or fill with blood well. As a result, the heart lags behind in its job of moving blood throughout the body. This can lead to symptoms such as swelling, trouble breathing, and feeling tired. Your Ejection Fraction (EF) is 45%, a normal EF is 55-60%.  -Please continue Torsemide 10mg by mouth daily to prevent fluid build up in the body.  -Avoid drinking more than 1.5L of fluid daily and maintain a low salt diet (max 2grams daily).  -Please weigh yourself daily, for any significant increases in daily weight of 2lbs/day or 5lbs/week with associated swelling in the legs or abdomen and/or shortness of breath, please call your doctor or go to the emergency room.  -Follow up with Dr. Ruiz in 1 week.      Diagnosis: Chronic atrial fibrillation  Assessment and Plan of Treatment: You have an abnormal heart rhythm (arrhythmia) called atrial fibrillation. With this condition, the hearts 2 upper chambers (the atria) quiver rather than squeeze the blood out in a normal pattern. This leads to an irregular and sometimes rapid heartbeat. Atrial fibrillation is serious condition as it affects the heart’s ability to fill with blood as it should and blood clots may form, which increases the risk for stroke.  -Please CONTINUE  ELIQUIS 2.5MG TWICE A DAY to prevent a stroke.  -Please CONTINUE METOPROLOL 25mg DAILY to keep your heart in normal rhythm.   -Please follow up with Dr. Ruiz.        Diagnosis: Mitral regurgitation  Assessment and Plan of Treatment: Mitral regurgitation (MR), which is also known as mitral insufficiency, is a common heart valve disorder. When MR is present, blood leaks backwards through the mitral valve when the heart contracts. This reduces the amount of blood that is pumped out to the body.  Due to your age and co-morbidities, you would not be deemed a surgical candidate for a valve repair as the risks of surgery outweigh the benefits. Please continue to monitor your fluid intake and continue your daily diuretic and blood pressure medications as prescribed.    Diagnosis: Dementia  Assessment and Plan of Treatment: Please be aware of the following symptoms of  delusions, false acusations, agitation or aggressive behavior, restless or pacing, sleep disturbances that may occur throughout the day especially towards the end of the day such as late afternoon or early evening. This is called sundowning and can occur in the elderly population and/or patients with dementia. During episodes of sundowing, it is important to provide reassurance to the patient, review any possible causes, remove any triggers, redirect behavior or attention, and reach out for help to better assist the patient.     PRINCIPAL DISCHARGE DIAGNOSIS  Diagnosis: Multifocal pneumonia  Assessment and Plan of Treatment: You came to the hospital and was diagnosed with a pneumonia. The cause of your pneumonia could have been secondary to aspiration. You completed a course of IV antibiotics and have three remaining doses of the oral antibiotic called Doxycycline. Please take Doxycycline 100mg TWICE DAILY with a final dose on 3/5/24.   Please maintain aspiration precautions by appreciating the following instructions: maintain a diet of MINCED AND MOIST food ONLY, please take small bites of food and swallow COMPLETELY prior to going for another bite of food, avoid drinking liquis with a straw, keep the head of your head elevated at 90 degrees while eating and for 30 minutes after eating. Have someone at the bedside while during every meal.   Please consider a speech and swallow evaluation in the outpatient setting for the near future.         SECONDARY DISCHARGE DIAGNOSES  Diagnosis: (HFpEF) heart failure with preserved ejection fraction  Assessment and Plan of Treatment: You have a weak heart, also known as Congestive Heart Failure (CHF). Heart failure is a condition in which the heart does not pump or fill with blood well. As a result, the heart lags behind in its job of moving blood throughout the body. This can lead to symptoms such as swelling, trouble breathing, and feeling tired. Your Ejection Fraction (EF) is 45%, a normal EF is 55-60%.  -Please continue Torsemide 10mg by mouth daily to prevent fluid build up in the body.  -Avoid drinking more than 1.5L of fluid daily and maintain a low salt diet (max 2grams daily).  -Please weigh yourself daily, for any significant increases in daily weight of 2lbs/day or 5lbs/week with associated swelling in the legs or abdomen and/or shortness of breath, please call your doctor or go to the emergency room.  -Follow up with Dr. Ruiz in 1 week.      Diagnosis: Chronic atrial fibrillation  Assessment and Plan of Treatment: You have an abnormal heart rhythm (arrhythmia) called atrial fibrillation. With this condition, the hearts 2 upper chambers (the atria) quiver rather than squeeze the blood out in a normal pattern. This leads to an irregular and sometimes rapid heartbeat. Atrial fibrillation is serious condition as it affects the heart’s ability to fill with blood as it should and blood clots may form, which increases the risk for stroke.  -Please CONTINUE  ELIQUIS 2.5MG TWICE A DAY to prevent a stroke.  -Please CONTINUE METOPROLOL 25mg DAILY to keep your heart in normal rhythm.   -Please follow up with Dr. Ruiz.        Diagnosis: Mitral regurgitation  Assessment and Plan of Treatment: Mitral regurgitation (MR), which is also known as mitral insufficiency, is a common heart valve disorder. When MR is present, blood leaks backwards through the mitral valve when the heart contracts. This reduces the amount of blood that is pumped out to the body.  Due to your age and co-morbidities, you would not be deemed a surgical candidate for a valve repair as the risks of surgery outweigh the benefits. Please continue to monitor your fluid intake and continue your daily diuretic and blood pressure medications as prescribed.    Diagnosis: Dementia  Assessment and Plan of Treatment: Please be aware of the following symptoms of  delusions, false acusations, agitation or aggressive behavior, restless or pacing, sleep disturbances that may occur throughout the day especially towards the end of the day such as late afternoon or early evening. This is called sundowning and can occur in the elderly population and/or patients with dementia. During episodes of sundowing, it is important to provide reassurance to the patient, review any possible causes, remove any triggers, redirect behavior or attention, and reach out for help to better assist the patient.    Diagnosis: Fracture of proximal end of left femur  Assessment and Plan of Treatment: Take down aquacel once at rehab. You were evaluated by the orthopedics team while at [facility] ED. Ortho stated the wound healing well.  Will remove staples prior to d/c and replace with steri/benzoine  Follow up with Dr. Zamorano for postop care, can call [phone] for an appointment   Remainder of care per primary team     PRINCIPAL DISCHARGE DIAGNOSIS  Diagnosis: Multifocal pneumonia  Assessment and Plan of Treatment: You came to the hospital and was diagnosed with a pneumonia. The cause of your pneumonia could have been secondary to aspiration. You completed a course of IV antibiotics and have three remaining doses of the oral antibiotic called Doxycycline. Please take Doxycycline 100mg TWICE DAILY with a final dose on 3/5/24.   Please maintain aspiration precautions by appreciating the following instructions: maintain a diet of MINCED AND MOIST food ONLY, please take small bites of food and swallow COMPLETELY prior to going for another bite of food, avoid drinking liquis with a straw, keep the head of your head elevated at 90 degrees while eating and for 30 minutes after eating. Have someone at the bedside while during every meal.   Please consider a speech and swallow evaluation in the outpatient setting for the near future.         SECONDARY DISCHARGE DIAGNOSES  Diagnosis: (HFpEF) heart failure with preserved ejection fraction  Assessment and Plan of Treatment: You have a weak heart, also known as Congestive Heart Failure (CHF). Heart failure is a condition in which the heart does not pump or fill with blood well. As a result, the heart lags behind in its job of moving blood throughout the body. This can lead to symptoms such as swelling, trouble breathing, and feeling tired. Your Ejection Fraction (EF) is 45%, a normal EF is 55-60%.  -Please continue Torsemide 10mg by mouth daily to prevent fluid build up in the body.  -Avoid drinking more than 1.5L of fluid daily and maintain a low salt diet (max 2grams daily).  -Please weigh yourself daily, for any significant increases in daily weight of 2lbs/day or 5lbs/week with associated swelling in the legs or abdomen and/or shortness of breath, please call your doctor or go to the emergency room.  -Follow up with Dr. Ruiz in 1 week.      Diagnosis: Chronic atrial fibrillation  Assessment and Plan of Treatment: You have an abnormal heart rhythm (arrhythmia) called atrial fibrillation. With this condition, the hearts 2 upper chambers (the atria) quiver rather than squeeze the blood out in a normal pattern. This leads to an irregular and sometimes rapid heartbeat. Atrial fibrillation is serious condition as it affects the heart’s ability to fill with blood as it should and blood clots may form, which increases the risk for stroke.  -Please CONTINUE  ELIQUIS 2.5MG TWICE A DAY to prevent a stroke.  -Please CONTINUE METOPROLOL 25mg DAILY to keep your heart in normal rhythm.   -Please follow up with Dr. Ruiz.        Diagnosis: Mitral regurgitation  Assessment and Plan of Treatment: Mitral regurgitation (MR), which is also known as mitral insufficiency, is a common heart valve disorder. When MR is present, blood leaks backwards through the mitral valve when the heart contracts. This reduces the amount of blood that is pumped out to the body.  Due to your age and co-morbidities, you would not be deemed a surgical candidate for a valve repair as the risks of surgery outweigh the benefits. Please continue to monitor your fluid intake and continue your daily diuretic and blood pressure medications as prescribed.    Diagnosis: Dementia  Assessment and Plan of Treatment: Please be aware of the following symptoms of  delusions, false acusations, agitation or aggressive behavior, restless or pacing, sleep disturbances that may occur throughout the day especially towards the end of the day such as late afternoon or early evening. This is called sundowning and can occur in the elderly population and/or patients with dementia. During episodes of sundowing, it is important to provide reassurance to the patient, review any possible causes, remove any triggers, redirect behavior or attention, and reach out for help to better assist the patient.    Diagnosis: Fracture of proximal end of left femur  Assessment and Plan of Treatment: Take down aquacel once at rehab. You were evaluated by the orthopedics team while at [facility] ED. Ortho stated the wound healing well and removed staples prior to d/c and replace with steri/benzoine  Follow up with Dr. Zamorano for postop care, can call [phone] for an appointment   Remainder of care per primary team     PRINCIPAL DISCHARGE DIAGNOSIS  Diagnosis: Multifocal pneumonia  Assessment and Plan of Treatment: You came to the hospital and was diagnosed with a pneumonia. The cause of your pneumonia could have been secondary to aspiration. You completed a course of IV antibiotics and have three remaining doses of the oral antibiotic called Doxycycline. Please take Doxycycline 100mg TWICE DAILY with a final dose on 3/5/24.   Please maintain aspiration precautions by appreciating the following instructions: maintain a diet of MINCED AND MOIST food ONLY, please take small bites of food and swallow COMPLETELY prior to going for another bite of food, avoid drinking liquis with a straw, keep the head of your head elevated at 90 degrees while eating and for 30 minutes after eating. Have someone at the bedside while during every meal.   Please consider a speech and swallow evaluation in the outpatient setting for the near future.         SECONDARY DISCHARGE DIAGNOSES  Diagnosis: (HFpEF) heart failure with preserved ejection fraction  Assessment and Plan of Treatment: You have a weak heart, also known as Congestive Heart Failure (CHF). Heart failure is a condition in which the heart does not pump or fill with blood well. As a result, the heart lags behind in its job of moving blood throughout the body. This can lead to symptoms such as swelling, trouble breathing, and feeling tired. Your Ejection Fraction (EF) is 45%, a normal EF is 55-60%.  -Please continue Torsemide 10mg by mouth daily to prevent fluid build up in the body.  -Avoid drinking more than 1.5L of fluid daily and maintain a low salt diet (max 2grams daily).  -Please weigh yourself daily, for any significant increases in daily weight of 2lbs/day or 5lbs/week with associated swelling in the legs or abdomen and/or shortness of breath, please call your doctor or go to the emergency room.  -Follow up with Dr. Ruiz in 1 week.      Diagnosis: Chronic atrial fibrillation  Assessment and Plan of Treatment: You have an abnormal heart rhythm (arrhythmia) called atrial fibrillation. With this condition, the hearts 2 upper chambers (the atria) quiver rather than squeeze the blood out in a normal pattern. This leads to an irregular and sometimes rapid heartbeat. Atrial fibrillation is serious condition as it affects the heart’s ability to fill with blood as it should and blood clots may form, which increases the risk for stroke.  -Please CONTINUE  ELIQUIS 2.5MG TWICE A DAY to prevent a stroke.  -Please CONTINUE METOPROLOL 25mg DAILY to keep your heart in normal rhythm.   -Please follow up with Dr. Ruiz.        Diagnosis: Mitral regurgitation  Assessment and Plan of Treatment: Mitral regurgitation (MR), which is also known as mitral insufficiency, is a common heart valve disorder. When MR is present, blood leaks backwards through the mitral valve when the heart contracts. This reduces the amount of blood that is pumped out to the body.  Due to your age and co-morbidities, you would not be deemed a surgical candidate for a valve repair as the risks of surgery outweigh the benefits. Please continue to monitor your fluid intake and continue your daily diuretic and blood pressure medications as prescribed.    Diagnosis: Dementia  Assessment and Plan of Treatment: Please be aware of the following symptoms of  delusions, false acusations, agitation or aggressive behavior, restless or pacing, sleep disturbances that may occur throughout the day especially towards the end of the day such as late afternoon or early evening. This is called sundowning and can occur in the elderly population and/or patients with dementia. During episodes of sundowing, it is important to provide reassurance to the patient, review any possible causes, remove any triggers, redirect behavior or attention, and reach out for help to better assist the patient.    Diagnosis: Fracture of proximal end of left femur  Assessment and Plan of Treatment: Take down aquacel once at rehab. You were evaluated by the orthopedics team while at [facility] ED. Ortho stated the wound healing well and removed staples prior to d/c and replace with steri/benzoine  Follow up with Dr. Zamorano for postop care, please call [phone] for an appointment     PRINCIPAL DISCHARGE DIAGNOSIS  Diagnosis: Multifocal pneumonia  Assessment and Plan of Treatment: You came to the hospital and was diagnosed with a pneumonia. The cause of your pneumonia could have been secondary to aspiration. You completed a course of IV antibiotics and have three remaining doses of the oral antibiotic called Doxycycline. Please take Doxycycline 100mg TWICE DAILY with a final dose on 3/5/24.   Please maintain aspiration precautions by appreciating the following instructions: maintain a diet of MINCED AND MOIST food ONLY, please take small bites of food and swallow COMPLETELY prior to going for another bite of food, avoid drinking liquis with a straw, keep the head of your head elevated at 90 degrees while eating and for 30 minutes after eating. Have someone at the bedside while during every meal.   Please consider a speech and swallow evaluation in the outpatient setting for the near future.         SECONDARY DISCHARGE DIAGNOSES  Diagnosis: (HFpEF) heart failure with preserved ejection fraction  Assessment and Plan of Treatment: You have a weak heart, also known as Congestive Heart Failure (CHF). Heart failure is a condition in which the heart does not pump or fill with blood well. As a result, the heart lags behind in its job of moving blood throughout the body. This can lead to symptoms such as swelling, trouble breathing, and feeling tired. Your Ejection Fraction (EF) is 45%, a normal EF is 55-60%.  -Please continue Torsemide 10mg by mouth daily to prevent fluid build up in the body.  -Avoid drinking more than 1.5L of fluid daily and maintain a low salt diet (max 2grams daily).  -Please weigh yourself daily, for any significant increases in daily weight of 2lbs/day or 5lbs/week with associated swelling in the legs or abdomen and/or shortness of breath, please call your doctor or go to the emergency room.  -Follow up with Dr. Ruiz in 1 week.      Diagnosis: Chronic atrial fibrillation  Assessment and Plan of Treatment: You have an abnormal heart rhythm (arrhythmia) called atrial fibrillation. With this condition, the hearts 2 upper chambers (the atria) quiver rather than squeeze the blood out in a normal pattern. This leads to an irregular and sometimes rapid heartbeat. Atrial fibrillation is serious condition as it affects the heart’s ability to fill with blood as it should and blood clots may form, which increases the risk for stroke.  -Please CONTINUE  ELIQUIS 2.5MG TWICE A DAY to prevent a stroke.  -Please CONTINUE METOPROLOL 25mg DAILY to keep your heart in normal rhythm.   -Please follow up with Dr. Ruiz.        Diagnosis: Mitral regurgitation  Assessment and Plan of Treatment: Mitral regurgitation (MR), which is also known as mitral insufficiency, is a common heart valve disorder. When MR is present, blood leaks backwards through the mitral valve when the heart contracts. This reduces the amount of blood that is pumped out to the body.  Due to your age and co-morbidities, you would not be deemed a surgical candidate for a valve repair as the risks of surgery outweigh the benefits. Please continue to monitor your fluid intake and continue your daily diuretic and blood pressure medications as prescribed.    Diagnosis: Dementia  Assessment and Plan of Treatment: Please be aware of the following symptoms of  delusions, false acusations, agitation or aggressive behavior, restless or pacing, sleep disturbances that may occur throughout the day especially towards the end of the day such as late afternoon or early evening. This is called sundowning and can occur in the elderly population and/or patients with dementia. During episodes of sundowing, it is important to provide reassurance to the patient, review any possible causes, remove any triggers, redirect behavior or attention, and reach out for help to better assist the patient.    Diagnosis: Fracture of proximal end of left femur  Assessment and Plan of Treatment: Take down aquacel once at rehab. You were evaluated by the orthopedics team while at [facility] ED. Ortho stated the wound healing well and removed staples prior to d/c and replace with steri/benzoine  Follow up with Dr. Zamorano for postop care, please call [phone] for an appointment    Diagnosis: Tricuspid regurgitation  Assessment and Plan of Treatment: You were found to have tricuspid regurgitation on echocardiogram that was also on echocardiogram done on 06/06/23. Please follow up with Dr. Ruiz.

## 2024-03-04 NOTE — ADVANCED PRACTICE NURSE CONSULT - ASSESSMENT
Attempt to see patient for consult. Primary RN and PCA at bedside; pt refused assessment and became agitated.      POLY Manrique updated.

## 2024-03-04 NOTE — PROGRESS NOTE ADULT - ASSESSMENT
89F DNR/DNI with hx of HTN, pAfib (on Eliquis), HFpEF (EF 55-60%, 6/2023 -->EF 45%), severe MR s/p Saulo transcatheter MV repair ([facility], 7/03/19), OA, and recent hospitalization 2/21-2/28/24 for mechanical fall with course c/b non-displaced Lt great trochanter proximal femur fx s/p ORIF and was discharged to [facility], presented w/ SOB and worsening cough c/f volume overload and multifocal opacities concerning for PNA. Patient is now s/p IV ceftriaxone and currently on Doxy 100mg BID with final dose 3/5/24 evening. S/p IV diuresis and is back on home torsemide 10mg. Patient is ready for discharge to [facility], however after the code grey called this am 3/4/24, patient will stay one more day in hopes of making a safer discharge tomorrow 3/5/24. Palliative consulted for further recs to help with agitation/sundowning episodes.  Of note, ortho team will stop by today to remove patient staples s/p surgery.

## 2024-03-04 NOTE — PROGRESS NOTE ADULT - TIME BILLING
Review of hospital course, labs, vitals, medical records.   Bedside exam and interview   Discussed plan of care with primary team ACP  discussed recs for soft diet with yury Ferrell , aspiration precautions   Documenting the encounter

## 2024-03-04 NOTE — DISCHARGE NOTE PROVIDER - CARE PROVIDERS DIRECT ADDRESSES
[email or web address]

## 2024-03-04 NOTE — PROGRESS NOTE ADULT - PROBLEM SELECTOR PLAN 1
- Diuresis: euvolemic, s/p IV diuresis. C/w home Torsemide 10mg PO daily (patient refusing medications this AM)  - GDMT: Metoprolol XL 25mg QD, Can consider Spironolactone 25mg QD for further GDMT optimization (check BP in AM).   - continue strict I&O     #Bilateral Multifocal Opacities   - appreciate Medicine co management  - Final dose of IV Ceftriaxone 2g Q24 today   -c/w  PO Doxycycline 100 mg BID x 5 days (03/01-03/05) for empiric coverage  - MRSA swab neg, RVP neg, Urine Strep Pneumo Antigen negative, Urine Legionella Antigen negative

## 2024-03-04 NOTE — DISCHARGE NOTE PROVIDER - CARE PROVIDER_API CALL
Gagan Ruiz  Cardiology  [address] [address]  Phone: [phone]  Fax: [phone]  Established Patient  Scheduled Appointment: 03/20/2024    Cas Zamorano  Orthopaedic Surgery  [address]  [place] [address]  Phone: [phone]  Fax: [phone]  Established Patient  Follow Up Time: 1 week

## 2024-03-04 NOTE — DISCHARGE NOTE NURSING/CASE MANAGEMENT/SOCIAL WORK - PATIENT PORTAL LINK FT
You can access the FollowMyHealth Patient Portal offered by [facility] by registering at the following website: [email or web address]. By joining [facility]’s FollowMyHealth portal, you will also be able to view your health information using other applications (apps) compatible with our system.

## 2024-03-04 NOTE — DISCHARGE NOTE PROVIDER - NSDCFUSCHEDAPPT_GEN_ALL_CORE_FT
Gagan Ruiz  [facility] Physician [facility]  HEART[facility] [address]  Scheduled Appointment: 03/20/2024

## 2024-03-04 NOTE — DISCHARGE NOTE NURSING/CASE MANAGEMENT/SOCIAL WORK - NSDCPEFALRISK_GEN_ALL_CORE
For information on Fall & Injury Prevention, visit: [email or web address] OR  [email or web address] OR  https://www.cdc.gov/steadi/patient.html

## 2024-03-04 NOTE — CONSULT NOTE ADULT - SUBJECTIVE AND OBJECTIVE BOX
Orthopaedic Surgery Consult Note    For Surgeon: Dr. Zamorano   POD7 S/P L IMN     HPI:  89F with hx of HTN, pAfib (on Eliquis), HFpEF (EF 55-60%, 6/2023), severe MR s/p Saulo transcathter MV repair ([facility], 7/03/19), OA, and recent hospitalization 2/21-2/28/24 for mechanical fall with course c/b non-displaced Lt great trochanter proximal femur fx s/p ORIF and was discharged to [facility], now presenting with SOB.     Per nianna Ferrell, while pt was at [facility], pt was complaining of both CP and SOB as well as worsening cough. Currently, pt denies any fatigue, weakness, headache, dizziness/lightheadedness, CP, palpitations, SOB, FELIZ, orthopnea/PND, LE edema, N/V, abdominal discomfort, melena, BRBPR, hematemesis, hematuria, or recent sick contacts/travel.    ED Course  VSS. Labs on admission notable for elevated Procal 0.26 with Chest CTA noting multifocal upper lobes and medial LLL patchy opacification c/f multifocal PNA and small Rt pleural effusion; though pt is afebrile, no leukocytosis, and negative RVP. Pt also noted to have elevated hsTrop T peaking at 116 and elevated BNP 9941; EKG with Sinus with frequent PACs and RBBB (unchanged from prior). Pt received IV Vanc x1, IV CTX x1, and Lasix 20mg IVx1. Pt was then admitted to cardiology service for further monitoring and management.     (29 Feb 2024 22:43)    ORTHO ADDENDUM  89F POD7 s/p L IMN with Dr. Zamorano s/p mechanical fall, discharged and then readmitted next day for SOB and further pulmonary work up. No new complaint, no numbness or tingling of lower extremities. She is going to rehab today vs tmr.     Allergies  penicillin (Hives; Blisters)  Intolerances    PAST MEDICAL & SURGICAL HISTORY:  Diastolic heart failure  Mitral regurgitation  Atrial fibrillation  HTN (hypertension)  H/O exploratory laparotomy  H/O total hysterectomy  History of open reduction and internal fixation (ORIF) procedure    MEDICATIONS  (STANDING):  apixaban 2.5 milliGRAM(s) Oral every 12 hours  cefTRIAXone   IVPB 1000 milliGRAM(s) IV Intermittent every 24 hours  doxycycline monohydrate Capsule 100 milliGRAM(s) Oral every 12 hours  ergocalciferol 26253 Unit(s) Oral <User Schedule>  latanoprost 0.005% Ophthalmic Solution 1 Drop(s) Both EYES at bedtime  melatonin 3 milliGRAM(s) Oral at bedtime  metoprolol succinate ER 25 milliGRAM(s) Oral daily  OLANZapine 5 milliGRAM(s) Oral at bedtime  polyethylene glycol 3350 17 Gram(s) Oral every 12 hours  senna 2 Tablet(s) Oral at bedtime  thiamine IVPB 500 milliGRAM(s) IV Intermittent daily  torsemide 10 milliGRAM(s) Oral daily    MEDICATIONS  (PRN):  acetaminophen     Tablet .. 650 milliGRAM(s) Oral every 6 hours PRN Moderate Pain (4 - 6)  buDESOnide    Inhalation Suspension 0.5 milliGRAM(s) Inhalation every 12 hours PRN Shortness of Breath  ipratropium    for Nebulization 500 MICROGram(s) Nebulizer every 6 hours PRN Shortness of Breath  OLANZapine Injectable 2.5 milliGRAM(s) IntraMuscular every 4 hours PRN aggitation      Vital Signs Last 24 Hrs  T(C): 36.7 (03 Mar 2024 20:15), Max: 36.7 (03 Mar 2024 20:15)  T(F): 98.1 (03 Mar 2024 20:15), Max: 98.1 (03 Mar 2024 20:15)  HR: 120 (04 Mar 2024 08:44) (80 - 120)  BP: 129/85 (03 Mar 2024 20:15) (110/84 - 129/85)  BP(mean): --  RR: 17 (03 Mar 2024 20:15) (17 - 18)  SpO2: 98% (03 Mar 2024 20:15) (97% - 98%)    Parameters below as of 03 Mar 2024 20:15  Patient On (Oxygen Delivery Method): room air    Physical Exam:  VS: stable, reviewed per nursing documentation  General: resting comfortably  Neuro: Alert  Psych: Normal mood & affect  HEENT: atraumatic   Neck: trachea midline  Respiratory: nonlabored on room air   CV: no cyanosis, no peripheral edema   Skin: Warm, dry, no rash, no lesions, no abrasions no ecchymosis   MSK: atraumatic with exception of below  LLE     -Inspection/palpation: Aquacel taken down, well appearing healing wound closed with staples x 3 incisions, new aquacel     -Compartments: soft, nontender bilaterally     -Motor: TA/GS/EHL/FHL 5/5 bilateral lower extremities     -Sensation: intact to light touch bilateral lower extremities    -Pulses: DP pulses palpable bilaterally, symmetric, extremities warm and well perfused, capillary refill brisk                           15.2   7.51  )-----------( 235      ( 03 Mar 2024 05:30 )             46.5     03-03    146<H>  |  107  |  28<H>  ----------------------------<  101<H>  3.9   |  25  |  1.28    Ca    9.4      03 Mar 2024 05:30  Mg     1.9     03-03    TPro  5.9<L>  /  Alb  3.1<L>  /  TBili  0.6  /  DBili  x   /  AST  67<H>  /  ALT  34  /  AlkPhos  123<H>  03-03      A/P: 89F POD7 s/p L IMN with Dr. Zamorano on 2/26  Wound healing well, new aquacel, pt can take down once at rehab (POD7)  Follow up with Dr. Zamorano 2 weeks after surgery for removal of staples and routine postop check (3/11/24, POD14)  Remainder of care per primary team   WBS: WBAT  Discussed with history, physical exam and plan with Dr. Zamorano    Ortho Pager [phone]   Orthopaedic Surgery Consult Note    For Surgeon: Dr. Zamorano   POD7 S/P L IMN     HPI:  89F with hx of HTN, pAfib (on Eliquis), HFpEF (EF 55-60%, 6/2023), severe MR s/p Saulo transcathter MV repair ([facility], 7/03/19), OA, and recent hospitalization 2/21-2/28/24 for mechanical fall with course c/b non-displaced Lt great trochanter proximal femur fx s/p ORIF and was discharged to [facility], now presenting with SOB.     Per nianna Ferrell, while pt was at [facility], pt was complaining of both CP and SOB as well as worsening cough. Currently, pt denies any fatigue, weakness, headache, dizziness/lightheadedness, CP, palpitations, SOB, FELIZ, orthopnea/PND, LE edema, N/V, abdominal discomfort, melena, BRBPR, hematemesis, hematuria, or recent sick contacts/travel.    ED Course  VSS. Labs on admission notable for elevated Procal 0.26 with Chest CTA noting multifocal upper lobes and medial LLL patchy opacification c/f multifocal PNA and small Rt pleural effusion; though pt is afebrile, no leukocytosis, and negative RVP. Pt also noted to have elevated hsTrop T peaking at 116 and elevated BNP 9941; EKG with Sinus with frequent PACs and RBBB (unchanged from prior). Pt received IV Vanc x1, IV CTX x1, and Lasix 20mg IVx1. Pt was then admitted to cardiology service for further monitoring and management.     (29 Feb 2024 22:43)    ORTHO ADDENDUM  89F POD7 s/p L IMN with Dr. Zamorano s/p mechanical fall, discharged and then readmitted next day for SOB and further pulmonary work up. No new complaint, no numbness or tingling of lower extremities. She is going to rehab today vs tmr.     Allergies  penicillin (Hives; Blisters)  Intolerances    PAST MEDICAL & SURGICAL HISTORY:  Diastolic heart failure  Mitral regurgitation  Atrial fibrillation  HTN (hypertension)  H/O exploratory laparotomy  H/O total hysterectomy  History of open reduction and internal fixation (ORIF) procedure    MEDICATIONS  (STANDING):  apixaban 2.5 milliGRAM(s) Oral every 12 hours  cefTRIAXone   IVPB 1000 milliGRAM(s) IV Intermittent every 24 hours  doxycycline monohydrate Capsule 100 milliGRAM(s) Oral every 12 hours  ergocalciferol 90095 Unit(s) Oral <User Schedule>  latanoprost 0.005% Ophthalmic Solution 1 Drop(s) Both EYES at bedtime  melatonin 3 milliGRAM(s) Oral at bedtime  metoprolol succinate ER 25 milliGRAM(s) Oral daily  OLANZapine 5 milliGRAM(s) Oral at bedtime  polyethylene glycol 3350 17 Gram(s) Oral every 12 hours  senna 2 Tablet(s) Oral at bedtime  thiamine IVPB 500 milliGRAM(s) IV Intermittent daily  torsemide 10 milliGRAM(s) Oral daily    MEDICATIONS  (PRN):  acetaminophen     Tablet .. 650 milliGRAM(s) Oral every 6 hours PRN Moderate Pain (4 - 6)  buDESOnide    Inhalation Suspension 0.5 milliGRAM(s) Inhalation every 12 hours PRN Shortness of Breath  ipratropium    for Nebulization 500 MICROGram(s) Nebulizer every 6 hours PRN Shortness of Breath  OLANZapine Injectable 2.5 milliGRAM(s) IntraMuscular every 4 hours PRN aggitation      Vital Signs Last 24 Hrs  T(C): 36.7 (03 Mar 2024 20:15), Max: 36.7 (03 Mar 2024 20:15)  T(F): 98.1 (03 Mar 2024 20:15), Max: 98.1 (03 Mar 2024 20:15)  HR: 120 (04 Mar 2024 08:44) (80 - 120)  BP: 129/85 (03 Mar 2024 20:15) (110/84 - 129/85)  BP(mean): --  RR: 17 (03 Mar 2024 20:15) (17 - 18)  SpO2: 98% (03 Mar 2024 20:15) (97% - 98%)    Parameters below as of 03 Mar 2024 20:15  Patient On (Oxygen Delivery Method): room air    Physical Exam:  VS: stable, reviewed per nursing documentation  General: resting comfortably  Neuro: Alert  Psych: Normal mood & affect  HEENT: atraumatic   Neck: trachea midline  Respiratory: nonlabored on room air   CV: no cyanosis, no peripheral edema   Skin: Warm, dry, no rash, no lesions, no abrasions no ecchymosis   MSK: atraumatic with exception of below  LLE     -Inspection/palpation: Aquacel taken down, well appearing healing wound closed with staples x 3 incisions, new aquacel     -Compartments: soft, nontender bilaterally     -Motor: TA/GS/EHL/FHL 5/5 bilateral lower extremities     -Sensation: intact to light touch bilateral lower extremities    -Pulses: DP pulses palpable bilaterally, symmetric, extremities warm and well perfused, capillary refill brisk                           15.2   7.51  )-----------( 235      ( 03 Mar 2024 05:30 )             46.5     03-03    146<H>  |  107  |  28<H>  ----------------------------<  101<H>  3.9   |  25  |  1.28    Ca    9.4      03 Mar 2024 05:30  Mg     1.9     03-03    TPro  5.9<L>  /  Alb  3.1<L>  /  TBili  0.6  /  DBili  x   /  AST  67<H>  /  ALT  34  /  AlkPhos  123<H>  03-03      A/P: 89F POD7 s/p L IMN with Dr. Zamorano on 2/26  Wound healing well, new aquacel, pt can take down once at rehab (POD7)  Will remove staples prior to d/c and replace with steri/benzoine  Follow up with Dr. Zamorano for postop care, can call [phone] for an appointment   Remainder of care per primary team   WBS: WBAT  Discussed with history, physical exam and plan with Dr. Zamorano    Ortho Pager [phone]
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
## 2024-03-04 NOTE — PROGRESS NOTE ADULT - PROBLEM SELECTOR PLAN 4
- Meds as above     - Fluids: not indicated  - Replete Lytes PRN to K>4, Mg>2  - Diet: minced w/ aspiration precautions  - DVT ppx: Eliquis   - SW/Dispo: accepted to [facility]--- attempt for safe discharge on 3/5/24 (d/c note complete just needs dc order when ready)

## 2024-03-04 NOTE — PROGRESS NOTE ADULT - NS ATTEND AMEND GEN_ALL_CORE FT
89F with hx of HTN, pAfib (on Eliquis), HFpEF (EF 55-60%, 6/2023), severe MR s/p Saulo transcathter MV repair ([facility], 7/03/19), OA, and recent hospitalization 2/21-2/28/24 for mechanical fall with course c/b non-displaced Lt great trochanter proximal femur fx s/p ORIF and was discharged to [facility], now presenting with SOB.     1. Multifocal PNA  cftx today, doxycycline upon discharge.    2. HF  Echo with low LVEF 45%, MV s.p JADYN with 2 SAULO clips, moderate MR, severe TR with PHTN.  Torsemide 10 mg and toprol.    3. NSTEMI  type 2, dnr dni cmo.    4. Valvular heart disease  continue torsemide.    5. Vascular  On exam cool extremities, but dopplerable pulses, vascular consulted, appreciate recs, no further work up, no evidence of acute arterial ischemia.    6. goals of care   DNR DNI CMO.    7. Delirious  Altered mental status from PNA and HF. Refusing oral meds at time zyprexa prn. will suggest seraquel if she accepts.    BRISA tomorrow.    I spent > 35 minutes in patient care.
89F with hx of HTN, pAfib (on Eliquis), HFpEF (EF 55-60%, 6/2023), severe MR s/p Yudith transcathter MV repair ([facility], 7/03/19), OA, and recent hospitalization 2/21-2/28/24 for mechanical fall with course c/b non-displaced Lt great trochanter proximal femur fx s/p ORIF and was discharged to [facility], now presenting with SOB.     1. Multifocal PNA  Likely hospital acquired, started on ceftriaxone and vanco, will swab, if negative for mrsa, stop vanco. F/u procalcitonin. May need to expand abx, but wbc ok, afebrile. Allergic to penicillin.    2. HF  Echo with low LVEF 45%, MV s.p JADYN with 2 YUDITH clips, moderate MR, severe TR with PHTN.  Start lasix IV 40 mg bid.  GDMT as tolerated.    3. NSTEMI  Likely type 2, currently delirious, not a candidate for lhc currently.    4. Valvular heart disease  Will attempt diuresis, not a candidate for JADYN TVR.    5. Vascular  On exam cool extremities, but dopplerable pulses, vascular consulted, appreciate recs, no further work up, no evidence of acute arterial ischemia.    6. goals of care   DNR DNI, will have HCP sign molst  off telemetry.    7. Delirious  Altered mental status from PNA and HF.         note entered on 3.1 for services rendered on 2.29. Spent 50 minutes in patient care.

## 2024-03-04 NOTE — DISCHARGE NOTE PROVIDER - PROVIDER TOKENS
PROVIDER:[TOKEN:[[id number]],SCHEDULEDAPPT:[03/20/2024],ESTABLISHEDPATIENT:[T]],PROVIDER:[TOKEN:[[id number]],FOLLOWUP:[1 week],ESTABLISHEDPATIENT:[T]]

## 2024-03-04 NOTE — PROGRESS NOTE ADULT - ASSESSMENT
89 yo woman with HTN, hx of GIB, paroxysmal afib (on eliquis 2.5mg BID), HFpEF, severe MR s/p transcatheter mitral valve repair using Saulo on 07/3/19 ([employer] IID research trial), severe TR, LBP/OA, gingivitis, recent admission (Feb 20th - Feb 28th) for left hip fracture s/p intramedullary mando with ortho. Was discharged to rehab. Now presenting from rehab one day later -- per family, was complaining of shortness of breath, ?chest discomfort. Found to have severe tricuspid regurgitation.       # Severe tricuspid regurgitation  # Chronic HFpEF  # Hx of mitral valve repair   home torsemide regimen 20mg qd   BNP Trend   2.29.24                          --- 9941  2.24.24                          --- 4699  9.22.23                          --- 970  5.16.23                          --- 793  4.18.23                          --- 752  f/u structural heart recs  diuretic regimen per primary team   [ ] recommend palliative care consult if not candidate for tricuspid valve repair     # Bilateral multifocal opacities 2/2 possible multifocal pneumonia   Discussed with primary team. Low suspicion for bacterial pneumonia. However, given CT Chest findings of multifocal opacities, favor treating empirically with short course of abx (total 5 days). Serial procalcitonin downtrended  [ ] completed 5 days of ceftriaxone today. Will complete doxycycline 100mg BID x 5 days on 3/5/24 (to cover for atypicals)  [ ] recommend aspiration precautions, elevate head of bed, speech and swallow eval. Recommend continuing aspiration precautions, soft diet at rehab    # Chronic Atrial fibrillation - on apixaban 2.5mg BID, on metoprolol succinate 25mg qd. ; Episode of rapid afib to 150bpm on March 1st 2024  # Metabolic encephalopathy 2/2 hospital delerium and urinary retention   - Bladder scans q6H    # Vitamin D insufficiency - Vitamin D 25 Hydroxy of 23.5 on last admission. [ ] continue Vitamin D 50,000 units q week. transition to qd outpatient once replete.   # Sacral wound (present on admission)- recommend wound care consult     DVT ppx - already on apixaban 2.5mg BID

## 2024-03-04 NOTE — DISCHARGE NOTE PROVIDER - ATTENDING DISCHARGE PHYSICAL EXAMINATION:
89F with hx of HTN, pAfib (on Eliquis), HFpEF (EF 55-60%, 6/2023), severe MR s/p Yudith transcathter MV repair ([facility], 7/03/19), OA, and recent hospitalization 2/21-2/28/24 for mechanical fall with course c/b non-displaced Lt great trochanter proximal femur fx s/p ORIF and was discharged to [facility], now presenting with SOB.     1. Multifocal PNA  cftx today, doxycycline upon discharge.    2. HF  Echo with low LVEF 45%, MV s.p JADYN with 2 YUDITH clips, moderate MR, severe TR with PHTN.  Torsemide 10 mg and toprol.    3. NSTEMI  type 2, dnr dni cmo.    4. Delirium  Waxes and wanes, underlying dementia. Responds well to zyprexa as needed.

## 2024-03-04 NOTE — PROGRESS NOTE ADULT - PROBLEM SELECTOR PLAN 2
- TTE (03/01/24): EF 45%, mod sym LVH, mildly reduced LVSF, dilated right ventricular cavity, reduced RVSF, severe TR, mild-mod MT, mod MR, pHTN present, PASP 80/31 mmHg, no pericardial effusion. s/p Two mitral valve transcatheter [id number] (YUDITH) clips noted attached to the A2-P2.  -CTSX consult not indicated 2/2 age and code status making patient not a surgical candidate     #Bilateral foot pain  - vascular consulted for b/l diminished pulses--> No evidence of a vascular pathology with palpable pulses on exam   - vascular team signed off

## 2024-03-04 NOTE — PROGRESS NOTE ADULT - SUBJECTIVE AND OBJECTIVE BOX
Cardiology PA Adult Progress Note    Subjective Assessment:     ROS negative except as noted above.  	  MEDICATIONS:  metoprolol succinate ER 25 milliGRAM(s) Oral daily  torsemide 10 milliGRAM(s) Oral daily  cefTRIAXone   IVPB 1000 milliGRAM(s) IV Intermittent every 24 hours  doxycycline monohydrate Capsule 100 milliGRAM(s) Oral every 12 hours  buDESOnide    Inhalation Suspension 0.5 milliGRAM(s) Inhalation every 12 hours PRN  ipratropium    for Nebulization 500 MICROGram(s) Nebulizer every 6 hours PRN  acetaminophen     Tablet .. 650 milliGRAM(s) Oral every 6 hours PRN  melatonin 3 milliGRAM(s) Oral at bedtime  OLANZapine 5 milliGRAM(s) Oral at bedtime  OLANZapine Injectable 2.5 milliGRAM(s) IntraMuscular every 4 hours PRN  polyethylene glycol 3350 17 Gram(s) Oral every 12 hours  senna 2 Tablet(s) Oral at bedtime  apixaban 2.5 milliGRAM(s) Oral every 12 hours  ergocalciferol 32611 Unit(s) Oral <User Schedule>  latanoprost 0.005% Ophthalmic Solution 1 Drop(s) Both EYES at bedtime  thiamine IVPB 500 milliGRAM(s) IV Intermittent daily    	    [PHYSICAL EXAM:  TELEMETRY:  T(C): 36.7 (03-03-24 @ 20:15), Max: 36.7 (03-03-24 @ 20:15)  HR: 115 (03-04-24 @ 00:00) (80 - 115)  BP: 129/85 (03-03-24 @ 20:15) (96/54 - 131/95)  RR: 17 (03-03-24 @ 20:15) (17 - 18)  SpO2: 98% (03-03-24 @ 20:15) (96% - 99%)  Wt(kg): --  I&O's Summary    03 Mar 2024 07:01  -  04 Mar 2024 07:00  --------------------------------------------------------  IN: 950 mL / OUT: 900 mL / NET: 50 mL                                          Appearance: Normal	  HEENT:   Normal oral mucosa, PERRLA, EOMI	  Neck: Supple, + JVD/ - JVD; Carotid Bruit   Cardiovascular: Normal S1 S2, No JVD, No murmurs,   Respiratory: Lungs clear to auscultation/Decreased Breath Sounds/No Rales, Rhonchi, Wheezing	  Gastrointestinal:  Soft, Non-tender, + BS	  Skin: No rashes, No ecchymoses, No cyanosis  Extremities: Normal range of motion, No clubbing, cyanosis or edema  Vascular: Peripheral pulses palpable 2+ bilaterally  Neurologic: Non-focal  Psychiatry: A & O x 3, Mood & affect appropriate      	    ECG:  	  RADIOLOGY:   DIAGNOSTIC TESTING:  [ ] Echocardiogram:   [ ]  Catheterization:  [ ] Stress Test:    [ ] MESFIN  OTHER: 	    LABS:	 	  CARDIAC MARKERS:                            15.2   7.51  )-----------( 235      ( 03 Mar 2024 05:30 )             46.5     03-03    146<H>  |  107  |  28<H>  ----------------------------<  101<H>  3.9   |  25  |  1.28    Ca    9.4      03 Mar 2024 05:30  Mg     1.9     03-03    TPro  5.9<L>  /  Alb  3.1<L>  /  TBili  0.6  /  DBili  x   /  AST  67<H>  /  ALT  34  /  AlkPhos  123<H>  03-03    proBNP:   Lipid Profile:   HgA1c:   TSH:    Cardiology PA Adult Progress Note    Subjective Assessment: Patient seen and examined at bedside. Code grey called this AM where patient was found to be disoriented thinking she was in her home. Patient was aggressive towards staff and Zyprexa IM 2.5mg dose x1 given. Patient became less aggressive as the situation deescalated.     ROS negative except as noted above.  	  MEDICATIONS:  metoprolol succinate ER 25 milliGRAM(s) Oral daily  torsemide 10 milliGRAM(s) Oral daily  cefTRIAXone   IVPB 1000 milliGRAM(s) IV Intermittent every 24 hours  doxycycline monohydrate Capsule 100 milliGRAM(s) Oral every 12 hours  buDESOnide    Inhalation Suspension 0.5 milliGRAM(s) Inhalation every 12 hours PRN  ipratropium    for Nebulization 500 MICROGram(s) Nebulizer every 6 hours PRN  acetaminophen     Tablet .. 650 milliGRAM(s) Oral every 6 hours PRN  melatonin 3 milliGRAM(s) Oral at bedtime  OLANZapine 5 milliGRAM(s) Oral at bedtime  OLANZapine Injectable 2.5 milliGRAM(s) IntraMuscular every 4 hours PRN  polyethylene glycol 3350 17 Gram(s) Oral every 12 hours  senna 2 Tablet(s) Oral at bedtime  apixaban 2.5 milliGRAM(s) Oral every 12 hours  ergocalciferol 10136 Unit(s) Oral <User Schedule>  latanoprost 0.005% Ophthalmic Solution 1 Drop(s) Both EYES at bedtime  thiamine IVPB 500 milliGRAM(s) IV Intermittent daily    	    [PHYSICAL EXAM:  TELEMETRY:  T(C): 36.7 (03-03-24 @ 20:15), Max: 36.7 (03-03-24 @ 20:15)  HR: 115 (03-04-24 @ 00:00) (80 - 115)  BP: 129/85 (03-03-24 @ 20:15) (96/54 - 131/95)  RR: 17 (03-03-24 @ 20:15) (17 - 18)  SpO2: 98% (03-03-24 @ 20:15) (96% - 99%)  Wt(kg): --  I&O's Summary    03 Mar 2024 07:01  -  04 Mar 2024 07:00  --------------------------------------------------------  IN: 950 mL / OUT: 900 mL / NET: 50 mL                                        patient refuses physical exam   Appearance: Normal	  HEENT:    PERRLA, 	  Respiratory: on room in no respiratory distress   Skin: No cyanosis  Extremities: Normal range of motion,   Vascular: Peripheral pulses palpable 2+ bilaterally  Neurologic: Non-focal  Psychiatry: aaox1       	    ECG:  	  RADIOLOGY:   DIAGNOSTIC TESTING:  [ ] Echocardiogram:   [ ]  Catheterization:  [ ] Stress Test:    [ ] MESFIN  OTHER: 	    LABS:	 	  CARDIAC MARKERS:                            15.2   7.51  )-----------( 235      ( 03 Mar 2024 05:30 )             46.5     03-03    146<H>  |  107  |  28<H>  ----------------------------<  101<H>  3.9   |  25  |  1.28    Ca    9.4      03 Mar 2024 05:30  Mg     1.9     03-03    TPro  5.9<L>  /  Alb  3.1<L>  /  TBili  0.6  /  DBili  x   /  AST  67<H>  /  ALT  34  /  AlkPhos  123<H>  03-03    proBNP:   Lipid Profile:   HgA1c:   TSH:

## 2024-03-05 NOTE — PROGRESS NOTE ADULT - ASSESSMENT
89 yo woman with HTN, hx of GIB, paroxysmal afib (on eliquis 2.5mg BID), HFpEF, severe MR s/p transcatheter mitral valve repair using Saulo on 07/3/19 ([employer] IID research trial), severe TR, LBP/OA, gingivitis, recent admission (Feb 20th - Feb 28th) for left hip fracture s/p intramedullary mando with ortho. Was discharged to rehab. Now presenting from rehab one day later -- per family, was complaining of shortness of breath, ?chest discomfort. Found to have severe tricuspid regurgitation.     # Severe tricuspid regurgitation  # Chronic HFpEF  # Hx of mitral valve repair   BNP Trend   2.29.24                          --- 9941  2.24.24                          --- 4699  9.22.23                          --- 970  5.16.23                          --- 793  4.18.23                          --- 752  f/u structural heart recs  diuretic regimen per primary team   [ ] recommend palliative care consult if not candidate for tricuspid valve repair. May be candidate for calvary    # Bilateral multifocal opacities   Discussed with primary team. Low suspicion for bacterial pneumonia. However, given CT Chest findings of multifocal opacities, favor treating empirically with short course of abx (total 5 days). Serial procalcitonin downtrended. afebrile. no leukocytosis.   - completed 5 days of ceftriaxone today. Will complete doxycycline 100mg BID x 5 days on 3/5/24 (to cover for atypicals)  [ ] recommend aspiration precautions, elevate head of bed, speech and swallow eval. Recommend continuing aspiration precautions, soft diet at rehab    # Chronic Atrial fibrillation - on apixaban 2.5mg BID, on metoprolol succinate 25mg qd. ; Episode of rapid afib to 150bpm on March 1st 2024  # Metabolic encephalopathy 2/2 hospital delirium and urinary retention   - Bladder scans q6H    # Vitamin D insufficiency - Vitamin D 25 Hydroxy of 23.5 on last admission. [ ] continue Vitamin D 50,000 units q week. transition to qd outpatient once replete.   # Sacral wound (present on admission)- wound care per nursing and wound care consult.     Discussed patient's poor prognosis with patient's niece Mariaelena at bedside today. Discussed poor prognosis after hip fracture, and finding of severe tricuspid regurgitation. Counseled niece on fact that patient will likely have another episode requiring rehospitalization in the near future (prone to delirium, may become more symptomatic from tricuspid regurgitation, ; discussed high risk of aspiration on 3.4.24), and that patient may become appropriate for hospice or [facility] in the near future.     DVT ppx - already on apixaban 2.5mg BID

## 2024-03-05 NOTE — PROGRESS NOTE ADULT - TIME BILLING
Review of hospital course, labs, vitals, medical records.   Bedside exam and interview   Discussed plan of care with cardiology ACP and attending   Documenting the encounter

## 2024-03-05 NOTE — PROGRESS NOTE ADULT - SUBJECTIVE AND OBJECTIVE BOX
Patient is a 89y old  Female who presents with a chief complaint of SOB (04 Mar 2024 16:23)    INTERVAL EVENTS:  not short of breath today   awake, interacting with interviewer ; speaks. However, not answering orientation questions.     SUBJECTIVE:  Patient was seen and examined at bedside.    MEDICATIONS:  MEDICATIONS  (STANDING):  apixaban 2.5 milliGRAM(s) Oral every 12 hours  cefTRIAXone   IVPB 1000 milliGRAM(s) IV Intermittent every 24 hours  doxycycline monohydrate Capsule 100 milliGRAM(s) Oral every 12 hours  ergocalciferol 90875 Unit(s) Oral <User Schedule>  latanoprost 0.005% Ophthalmic Solution 1 Drop(s) Both EYES at bedtime  melatonin 3 milliGRAM(s) Oral at bedtime  metoprolol succinate ER 25 milliGRAM(s) Oral daily  OLANZapine 7.5 milliGRAM(s) Oral at bedtime  polyethylene glycol 3350 17 Gram(s) Oral every 12 hours  senna 2 Tablet(s) Oral at bedtime  torsemide 10 milliGRAM(s) Oral daily    MEDICATIONS  (PRN):  acetaminophen     Tablet .. 650 milliGRAM(s) Oral every 6 hours PRN Moderate Pain (4 - 6)  buDESOnide    Inhalation Suspension 0.5 milliGRAM(s) Inhalation every 12 hours PRN Shortness of Breath  ipratropium    for Nebulization 500 MICROGram(s) Nebulizer every 6 hours PRN Shortness of Breath  OLANZapine Injectable 2.5 milliGRAM(s) IntraMuscular every 4 hours PRN aggitation      Allergies  penicillin (Hives; Blisters)    Intolerances    OBJECTIVE:  Vital Signs Last 24 Hrs  T(C): 36.8 (05 Mar 2024 06:20), Max: 36.8 (05 Mar 2024 06:20)  T(F): 98.3 (05 Mar 2024 06:20), Max: 98.3 (05 Mar 2024 06:20)  HR: 103 (05 Mar 2024 10:10) (103 - 123)  BP: --  BP(mean): --  RR: --  SpO2: --      I&O's Summary    04 Mar 2024 07:01  -  05 Mar 2024 07:00  --------------------------------------------------------  IN: 120 mL / OUT: 0 mL / NET: 120 mL    PHYSICAL EXAM:  Gen: Reclining in bed at time of exam, appears stated age  HEENT: MMM, clear OP  Neck: trachea at midline  CV: irregularly irregular ; +S1/S2  Pulm: adequate respiratory effort, no increase in work of breathing today.   Abd: soft, ND  Skin: warm and dry,   Ext: no LE edema   Neuro: Awake, interacts with interviewer; able to speak. However, not answering orientation questions.   Psych: not agitated at time of interview     LABS:      CAPILLARY BLOOD GLUCOSE      MICRODATA:      RADIOLOGY/OTHER STUDIES:

## 2024-03-08 NOTE — ASSESSMENT
[FreeTextEntry1] : - HTN acceptable for her age - leg edema i don't think she understands taking torsemide every day confirm with pharmacy she picked it up - afib on eliquis and toprolx 25 mg daily - cookie mitral valve can stop aspirin she is on eliquis 2.5 mg bid  - poor balance she is not orthostatic vestibular therapy to see  ENT - am considering adding aldactone 12.5 mg daily not sure she will split her pills or be able to follow directions her K was also borderline - home meal delivery she has lost of weight -fu in 2 months

## 2024-03-08 NOTE — HISTORY OF PRESENT ILLNESS
[FreeTextEntry1] : 89 F HTN PAF HFpEF Severe MR s/p Cookie 7/3/2019 Severe residual MR Severe TR Neuropathy  here for fu of her sob which is stable she is taking torsemide as directed and her leg edema has gotten better. her dizziness is still persistent lasts seconds worse with turning her head.  hospitalized 2/29/24-3/4/24 for chf exac and PNA. s/p IV diuresis and abx. deemed not a surgical candidate for tricuspid valve repair   ecg nsr inc rbbb 9/22/2023 echo 6/2023 LVEF dilated LV normal 2 cookie devices mean 3 mmHg severe anteriorly directed MR severe TR MIldly dilated right ventricle  echo 3/1/24 EF 45%, mod sym LVH, mildly reduced LVSF, dilated RV, reduced RVSF, severe TR, mild-mod KY, mod MR, PASP 80/31 mmHg, no pericardial effusion. s/p Two mitral valve transcatheter [id number] (COOKIE) clips noted attached to the A2-P2 Carotid US no stenosis 12/22/2023

## 2024-03-08 NOTE — PHYSICAL EXAM
[Well Developed] : well developed [Well Nourished] : well nourished [No Acute Distress] : no acute distress [Normal Conjunctiva] : normal conjunctiva [Normal Venous Pressure] : normal venous pressure [No Carotid Bruit] : no carotid bruit [Normal S1, S2] : normal S1, S2 [No Murmur] : no murmur [No Rub] : no rub [Clear Lung Fields] : clear lung fields [No Gallop] : no gallop [Good Air Entry] : good air entry [Soft] : abdomen soft [No Respiratory Distress] : no respiratory distress  [Non Tender] : non-tender [No Masses/organomegaly] : no masses/organomegaly [Normal Bowel Sounds] : normal bowel sounds [Normal Gait] : normal gait [No Edema] : no edema [No Cyanosis] : no cyanosis [No Clubbing] : no clubbing [No Varicosities] : no varicosities [No Rash] : no rash [No Skin Lesions] : no skin lesions [Moves all extremities] : moves all extremities [No Focal Deficits] : no focal deficits [Normal Speech] : normal speech [Alert and Oriented] : alert and oriented [Normal memory] : normal memory [de-identified] : HSM murmur [de-identified] : mild ankle leg edema bilaterally

## 2024-04-02 PROBLEM — R60.0 LEG EDEMA: Status: ACTIVE | Noted: 2023-01-01

## 2024-04-02 NOTE — ASSESSMENT
[FreeTextEntry1] : - increase torsemide to 20 mg daily - afib on eliquis 2.5 mg bid and toprol xl 25 mg daily -fu in three weeks virtually - need paperwork from  regarding power of

## 2024-04-02 NOTE — HISTORY OF PRESENT ILLNESS
[FreeTextEntry1] : 89 F HTN PAF HFpEF Severe MR s/p Saulo 7/3/2019 Severe residual MR Severe TR Neuropathy  admitted for fall 2/23/24 found to have femur fracture subsequenlty treated for PNA from 2/28/24-3/4/[facility] Niece Destinee assisting with the visit. Ms Colón is oriented to person place and year but is sleepy. She is noted to have increasing leg swelling Brie and her sister Mariaelena would like to be able to manage her finances so she could pay her bills. Ms. Colón has no complaints     ecg nsr inc rbbb 9/22/2023   Echo EF normal RV dilated reduced RV funciton Saulo clips moderate MR Severe TR  PASP 80 mmHg  Carotid US no stenosis 12/22/2023

## 2024-04-02 NOTE — REASON FOR VISIT
[FreeTextEntry1] :  Discussed with patient: You have chosen to receive care through the use of tele-media. Tele-media enables health care providers at different locations to provide safe, effective, and convenient care through the use of technology. Please note this is a billable encounter. As with any health care service, there are risks associated with the use of tele-media, including equipment failure, poor image and/or resolution, and  issues. You understand that I cannot physically examine you and that you may need to come to the clinic to complete the assessment. Patient agreed verbally to understanding the risks and benefits of tele-media as explained. All questions regarding tele-media encounters were answered.

## 2024-05-02 ENCOUNTER — APPOINTMENT (OUTPATIENT)
Dept: HEART AND VASCULAR | Facility: CLINIC | Age: 89
End: 2024-05-02

## 2024-06-17 ENCOUNTER — APPOINTMENT (OUTPATIENT)
Dept: CARDIOTHORACIC SURGERY | Facility: CLINIC | Age: 89
End: 2024-06-17

## 2024-07-26 NOTE — PATIENT PROFILE ADULT - DO YOU NEED ADDITIONAL SERVICES TO MANAGE ANY OF THESE MEDICAL CONDITIONS AT HOME?
Health Maintenance       Colorectal Cancer Screen (View Topic Details)  Ordered on 7/22/2024    COVID-19 Vaccine (4 - 2023-24 season)  Overdue since 9/1/2023    Shingles Vaccine (1 of 2)  Never done           Following review of the above:  Patient is not proceeding with: Colorectal Cancer Screening, COVID-19, and Shingles    Note: Refer to final orders and clinician documentation.      
no

## 2025-01-23 NOTE — DISCHARGE NOTE NURSING/CASE MANAGEMENT/SOCIAL WORK - NSPROEXTENSIONSOFSELF_GEN_A_NUR
Post-Op Assessment Note    CV Status:  Stable  Pain Score: 0    Pain management: adequate       Mental Status:  Alert and awake   Hydration Status:  Stable   PONV Controlled:  None   Airway Patency:  Patent  Two or more mitigation strategies used for obstructive sleep apnea. There is a medical reason for not screening for obstructive sleep apnea and/or for not using two or more mitigation strategies   Post Op Vitals Reviewed: Yes    No anethesia notable event occurred.    Staff: Anesthesiologist, with CRNAs           Last Filed PACU Vitals:  Vitals Value Taken Time   Temp 98.1 °F (36.7 °C) 01/23/25 1441   Pulse 50 01/23/25 1534   /65 01/23/25 1531   Resp 43 01/23/25 1534   SpO2 98 % 01/23/25 1534   Vitals shown include unfiled device data.    Modified Maria Isabel:     Vitals Value Taken Time   Activity 2 01/23/25 1525   Respiration 2 01/23/25 1525   Circulation 2 01/23/25 1525   Consciousness 2 01/23/25 1525   Oxygen Saturation 2 01/23/25 1525     Modified Maria Isabel Score: 10             none

## (undated) DEVICE — CLIP CLOSED TUBE

## (undated) DEVICE — DRILL BIT STRYKER ORTHO LOKG 4.2X360MM

## (undated) DEVICE — PACK BASIC GOWN MAYO COVER

## (undated) DEVICE — GLV 7 PROTEXIS (WHITE)

## (undated) DEVICE — WARMING BLANKET UPPER ADULT

## (undated) DEVICE — PREP CHLORAPREP HI-LITE ORANGE 26ML

## (undated) DEVICE — VENODYNE/SCD SLEEVE CALF MEDIUM